# Patient Record
Sex: MALE | Race: WHITE | NOT HISPANIC OR LATINO | Employment: OTHER | RURAL
[De-identification: names, ages, dates, MRNs, and addresses within clinical notes are randomized per-mention and may not be internally consistent; named-entity substitution may affect disease eponyms.]

---

## 2017-02-13 ENCOUNTER — OFFICE VISIT (OUTPATIENT)
Dept: FAMILY MEDICINE CLINIC | Facility: CLINIC | Age: 52
End: 2017-02-13

## 2017-02-13 VITALS
BODY MASS INDEX: 32.48 KG/M2 | RESPIRATION RATE: 16 BRPM | DIASTOLIC BLOOD PRESSURE: 74 MMHG | WEIGHT: 232 LBS | HEART RATE: 60 BPM | SYSTOLIC BLOOD PRESSURE: 112 MMHG | TEMPERATURE: 97.9 F | HEIGHT: 71 IN

## 2017-02-13 DIAGNOSIS — F79 MENTAL RETARDATION: Primary | ICD-10-CM

## 2017-02-13 PROCEDURE — 99213 OFFICE O/P EST LOW 20 MIN: CPT | Performed by: NURSE PRACTITIONER

## 2017-02-13 NOTE — PROGRESS NOTES
Subjective   Orlin Mendes is a 51 y.o. male.     HPI Comments: Has a hx of mental retardation, anxiety and some aggressive behavior.  Is a client of hands of [employer].  Presently sees psychiatrist at [facility] and meds are prescribed for his aggression.    Mental Health Problem   Primary symptoms comment: agression, anxiety. The current episode started more than 1 month ago. This is a chronic problem.   The onset of the illness is precipitated by a stressful event. The degree of incapacity that he is experiencing as a consequence of his illness is severe. Additional symptoms of the illness include agitation.        The following portions of the patient's history were reviewed and updated as appropriate: allergies, current medications, past family history, past medical history, past social history, past surgical history and problem list.    Review of Systems   Constitutional: Negative.    HENT: Negative.    Respiratory: Negative.    Cardiovascular: Negative.    Skin: Negative.    Psychiatric/Behavioral: Positive for agitation and behavioral problems.       Objective   Physical Exam   Constitutional: He is oriented to person, place, and time. He appears well-developed and well-nourished. No distress.   HENT:   Head: Normocephalic.   Eyes: EOM are normal. Pupils are equal, round, and reactive to light.   Neck: Normal range of motion. Neck supple. No thyromegaly present.   Cardiovascular: Normal rate, regular rhythm and normal heart sounds.  Exam reveals no friction rub.    No murmur heard.  Pulmonary/Chest: Effort normal and breath sounds normal. No respiratory distress. He has no wheezes. He has no rales.   Abdominal: Soft.   Musculoskeletal: Normal range of motion.   Neurological: He is alert and oriented to person, place, and time.   Skin: Skin is warm and dry.   Psychiatric:   Does follow simple commands.  Does not initiate speech, but will parrot what is being said.   Nursing note and vitals  reviewed.      Assessment/Plan   Orlin was seen today for med refill.    Diagnoses and all orders for this visit:    Mental retardation    meds are reviewed.  Does not need refills today.  Will also update labs when he returns in august.

## 2017-05-25 ENCOUNTER — OFFICE VISIT (OUTPATIENT)
Dept: FAMILY MEDICINE CLINIC | Facility: CLINIC | Age: 52
End: 2017-05-25

## 2017-05-25 ENCOUNTER — LAB (OUTPATIENT)
Dept: LAB | Facility: CLINIC | Age: 52
End: 2017-05-25

## 2017-05-25 VITALS
HEIGHT: 71 IN | OXYGEN SATURATION: 99 % | HEART RATE: 59 BPM | SYSTOLIC BLOOD PRESSURE: 124 MMHG | TEMPERATURE: 98.9 F | RESPIRATION RATE: 18 BRPM | DIASTOLIC BLOOD PRESSURE: 72 MMHG | BODY MASS INDEX: 33.32 KG/M2 | WEIGHT: 238 LBS

## 2017-05-25 DIAGNOSIS — Z12.5 SCREENING FOR PROSTATE CANCER: ICD-10-CM

## 2017-05-25 DIAGNOSIS — K21.9 GASTROESOPHAGEAL REFLUX DISEASE, ESOPHAGITIS PRESENCE NOT SPECIFIED: ICD-10-CM

## 2017-05-25 DIAGNOSIS — N40.0 PROSTATE HYPERTROPHY: ICD-10-CM

## 2017-05-25 DIAGNOSIS — Z13.1 SCREENING FOR DIABETES MELLITUS: ICD-10-CM

## 2017-05-25 DIAGNOSIS — Z01.818 PREOPERATIVE CLEARANCE: Primary | ICD-10-CM

## 2017-05-25 PROCEDURE — 99214 OFFICE O/P EST MOD 30 MIN: CPT | Performed by: NURSE PRACTITIONER

## 2017-05-25 PROCEDURE — 93010 ELECTROCARDIOGRAM REPORT: CPT | Performed by: INTERNAL MEDICINE

## 2017-05-25 PROCEDURE — 85025 COMPLETE CBC W/AUTO DIFF WBC: CPT | Performed by: NURSE PRACTITIONER

## 2017-05-25 PROCEDURE — 93005 ELECTROCARDIOGRAM TRACING: CPT | Performed by: NURSE PRACTITIONER

## 2017-05-25 PROCEDURE — 84153 ASSAY OF PSA TOTAL: CPT | Performed by: NURSE PRACTITIONER

## 2017-05-25 PROCEDURE — 80053 COMPREHEN METABOLIC PANEL: CPT | Performed by: NURSE PRACTITIONER

## 2017-05-25 RX ORDER — QUETIAPINE FUMARATE 100 MG/1
100 TABLET, FILM COATED ORAL NIGHTLY
COMMUNITY
End: 2018-08-21

## 2017-05-25 RX ORDER — CITALOPRAM 40 MG/1
20 TABLET ORAL DAILY
COMMUNITY

## 2017-05-25 RX ORDER — ESOMEPRAZOLE MAGNESIUM 40 MG/1
40 CAPSULE, DELAYED RELEASE ORAL DAILY
Qty: 30 CAPSULE | Refills: 6 | Status: SHIPPED | OUTPATIENT
Start: 2017-05-25 | End: 2017-08-11 | Stop reason: SDUPTHER

## 2017-05-25 RX ORDER — ESOMEPRAZOLE MAGNESIUM 40 MG/1
40 CAPSULE, DELAYED RELEASE ORAL DAILY
Qty: 30 CAPSULE | Refills: 6 | Status: SHIPPED | OUTPATIENT
Start: 2017-05-25 | End: 2017-05-26 | Stop reason: SDUPTHER

## 2017-05-25 RX ORDER — CEFUROXIME AXETIL 250 MG/1
TABLET ORAL
Refills: 0 | COMMUNITY
Start: 2017-05-12 | End: 2017-08-11

## 2017-05-25 RX ORDER — CHLORHEXIDINE GLUCONATE 0.12 MG/ML
15 RINSE ORAL 2 TIMES DAILY
COMMUNITY

## 2017-05-26 PROBLEM — Z01.818 PREOPERATIVE CLEARANCE: Status: ACTIVE | Noted: 2017-05-26

## 2017-05-26 PROBLEM — K21.9 GASTROESOPHAGEAL REFLUX DISEASE: Status: ACTIVE | Noted: 2017-05-26

## 2017-05-26 LAB
ALBUMIN SERPL-MCNC: 3.9 G/DL (ref 3.4–4.8)
ALBUMIN/GLOB SERPL: 1.3 G/DL (ref 1.1–1.8)
ALP SERPL-CCNC: 52 U/L (ref 38–126)
ALT SERPL W P-5'-P-CCNC: 39 U/L (ref 21–72)
ANION GAP SERPL CALCULATED.3IONS-SCNC: 12 MMOL/L (ref 5–15)
AST SERPL-CCNC: 22 U/L (ref 17–59)
BASOPHILS # BLD AUTO: 0.02 10*3/MM3 (ref 0–0.2)
BASOPHILS NFR BLD AUTO: 0.4 % (ref 0–2)
BILIRUB SERPL-MCNC: 0.5 MG/DL (ref 0.2–1.3)
BUN BLD-MCNC: 11 MG/DL (ref 7–21)
BUN/CREAT SERPL: 11.6 (ref 7–25)
CALCIUM SPEC-SCNC: 9.2 MG/DL (ref 8.4–10.2)
CHLORIDE SERPL-SCNC: 100 MMOL/L (ref 95–110)
CO2 SERPL-SCNC: 30 MMOL/L (ref 22–31)
CREAT BLD-MCNC: 0.95 MG/DL (ref 0.7–1.3)
DEPRECATED RDW RBC AUTO: 39.4 FL (ref 35.1–43.9)
EOSINOPHIL # BLD AUTO: 0.41 10*3/MM3 (ref 0–0.7)
EOSINOPHIL NFR BLD AUTO: 8.6 % (ref 0–7)
ERYTHROCYTE [DISTWIDTH] IN BLOOD BY AUTOMATED COUNT: 12.2 % (ref 11.5–14.5)
GFR SERPL CREATININE-BSD FRML MDRD: 84 ML/MIN/1.73 (ref 56–130)
GLOBULIN UR ELPH-MCNC: 2.9 GM/DL (ref 2.3–3.5)
GLUCOSE BLD-MCNC: 90 MG/DL (ref 60–100)
HCT VFR BLD AUTO: 38 % (ref 39–49)
HGB BLD-MCNC: 13.1 G/DL (ref 13.7–17.3)
IMM GRANULOCYTES # BLD: 0 10*3/MM3 (ref 0–0.02)
IMM GRANULOCYTES NFR BLD: 0 % (ref 0–0.5)
LYMPHOCYTES # BLD AUTO: 1.68 10*3/MM3 (ref 0.6–4.2)
LYMPHOCYTES NFR BLD AUTO: 35.1 % (ref 10–50)
MCH RBC QN AUTO: 30.1 PG (ref 26.5–34)
MCHC RBC AUTO-ENTMCNC: 34.5 G/DL (ref 31.5–36.3)
MCV RBC AUTO: 87.4 FL (ref 80–98)
MONOCYTES # BLD AUTO: 0.32 10*3/MM3 (ref 0–0.9)
MONOCYTES NFR BLD AUTO: 6.7 % (ref 0–12)
NEUTROPHILS # BLD AUTO: 2.36 10*3/MM3 (ref 2–8.6)
NEUTROPHILS NFR BLD AUTO: 49.2 % (ref 37–80)
PLATELET # BLD AUTO: 147 10*3/MM3 (ref 150–450)
PMV BLD AUTO: 11.6 FL (ref 8–12)
POTASSIUM BLD-SCNC: 3.9 MMOL/L (ref 3.5–5.1)
PROT SERPL-MCNC: 6.8 G/DL (ref 6.3–8.6)
PSA SERPL-MCNC: 0.29 NG/ML (ref 0–4)
RBC # BLD AUTO: 4.35 10*6/MM3 (ref 4.37–5.74)
SODIUM BLD-SCNC: 142 MMOL/L (ref 137–145)
WBC NRBC COR # BLD: 4.79 10*3/MM3 (ref 3.2–9.8)

## 2017-08-11 ENCOUNTER — OFFICE VISIT (OUTPATIENT)
Dept: FAMILY MEDICINE CLINIC | Facility: CLINIC | Age: 52
End: 2017-08-11

## 2017-08-11 VITALS
OXYGEN SATURATION: 98 % | HEIGHT: 71 IN | TEMPERATURE: 97.8 F | RESPIRATION RATE: 16 BRPM | HEART RATE: 66 BPM | BODY MASS INDEX: 33.74 KG/M2 | DIASTOLIC BLOOD PRESSURE: 71 MMHG | WEIGHT: 241 LBS | SYSTOLIC BLOOD PRESSURE: 118 MMHG

## 2017-08-11 DIAGNOSIS — N40.0 BENIGN PROSTATIC HYPERPLASIA, PRESENCE OF LOWER URINARY TRACT SYMPTOMS UNSPECIFIED, UNSPECIFIED MORPHOLOGY: ICD-10-CM

## 2017-08-11 DIAGNOSIS — R15.9 FECAL INCONTINENCE: Primary | ICD-10-CM

## 2017-08-11 DIAGNOSIS — R21 RASH: ICD-10-CM

## 2017-08-11 DIAGNOSIS — R50.9 FEVER, UNSPECIFIED FEVER CAUSE: ICD-10-CM

## 2017-08-11 DIAGNOSIS — B35.1 TOENAIL FUNGUS: ICD-10-CM

## 2017-08-11 DIAGNOSIS — L84 CALLUS: ICD-10-CM

## 2017-08-11 DIAGNOSIS — B07.0 PLANTAR WART OF RIGHT FOOT: ICD-10-CM

## 2017-08-11 DIAGNOSIS — K21.9 GASTROESOPHAGEAL REFLUX DISEASE, ESOPHAGITIS PRESENCE NOT SPECIFIED: ICD-10-CM

## 2017-08-11 DIAGNOSIS — F41.9 ANXIETY: ICD-10-CM

## 2017-08-11 DIAGNOSIS — R05.9 COUGH: ICD-10-CM

## 2017-08-11 DIAGNOSIS — R19.7 DIARRHEA, UNSPECIFIED TYPE: ICD-10-CM

## 2017-08-11 PROCEDURE — 99214 OFFICE O/P EST MOD 30 MIN: CPT | Performed by: NURSE PRACTITIONER

## 2017-08-11 RX ORDER — TAMSULOSIN HYDROCHLORIDE 0.4 MG/1
1 CAPSULE ORAL DAILY
Qty: 30 CAPSULE | Refills: 12 | Status: SHIPPED | OUTPATIENT
Start: 2017-08-11 | End: 2018-08-10 | Stop reason: SDUPTHER

## 2017-08-11 RX ORDER — LOPERAMIDE HYDROCHLORIDE 2 MG/1
2 CAPSULE ORAL 4 TIMES DAILY PRN
Qty: 30 CAPSULE | Refills: 12 | Status: SHIPPED | OUTPATIENT
Start: 2017-08-11 | End: 2018-09-10 | Stop reason: SDUPTHER

## 2017-08-11 RX ORDER — DIAPER,BRIEF,INFANT-TODD,DISP
EACH MISCELLANEOUS 3 TIMES DAILY
Qty: 56 G | Refills: 12 | Status: SHIPPED | OUTPATIENT
Start: 2017-08-11 | End: 2017-08-11 | Stop reason: SDUPTHER

## 2017-08-11 RX ORDER — LOPERAMIDE HYDROCHLORIDE 2 MG/1
2 CAPSULE ORAL 4 TIMES DAILY PRN
Qty: 30 CAPSULE | Refills: 12 | Status: SHIPPED | OUTPATIENT
Start: 2017-08-11 | End: 2017-08-11 | Stop reason: SDUPTHER

## 2017-08-11 RX ORDER — ACETAMINOPHEN 325 MG/1
650 TABLET ORAL EVERY 4 HOURS PRN
Qty: 30 TABLET | Refills: 12 | Status: SHIPPED | OUTPATIENT
Start: 2017-08-11 | End: 2018-09-20 | Stop reason: SDUPTHER

## 2017-08-11 RX ORDER — ACETAMINOPHEN 325 MG/1
650 TABLET ORAL EVERY 4 HOURS PRN
Qty: 30 TABLET | Refills: 12 | Status: SHIPPED | OUTPATIENT
Start: 2017-08-11 | End: 2017-08-11 | Stop reason: SDUPTHER

## 2017-08-11 RX ORDER — THIORIDAZINE HYDROCHLORIDE 25 MG/1
25 TABLET, FILM COATED ORAL 3 TIMES DAILY
Qty: 90 TABLET | Refills: 12 | Status: SHIPPED | OUTPATIENT
Start: 2017-08-11 | End: 2017-08-11 | Stop reason: SDUPTHER

## 2017-08-11 RX ORDER — DIAPER,BRIEF,INFANT-TODD,DISP
EACH MISCELLANEOUS 3 TIMES DAILY
Qty: 56 G | Refills: 12 | Status: SHIPPED | OUTPATIENT
Start: 2017-08-11 | End: 2018-08-10 | Stop reason: SDUPTHER

## 2017-08-11 RX ORDER — ESOMEPRAZOLE MAGNESIUM 40 MG/1
40 CAPSULE, DELAYED RELEASE ORAL DAILY
Qty: 30 CAPSULE | Refills: 12 | Status: SHIPPED | OUTPATIENT
Start: 2017-08-11 | End: 2018-08-10 | Stop reason: SDUPTHER

## 2017-08-11 RX ORDER — THIORIDAZINE HYDROCHLORIDE 25 MG/1
25 TABLET, FILM COATED ORAL 3 TIMES DAILY
Qty: 90 TABLET | Refills: 12 | Status: SHIPPED | OUTPATIENT
Start: 2017-08-11 | End: 2018-08-10 | Stop reason: SDUPTHER

## 2017-08-11 RX ORDER — TAMSULOSIN HYDROCHLORIDE 0.4 MG/1
1 CAPSULE ORAL DAILY
Qty: 30 CAPSULE | Refills: 12 | Status: SHIPPED | OUTPATIENT
Start: 2017-08-11 | End: 2017-08-11 | Stop reason: SDUPTHER

## 2017-08-11 RX ORDER — ESOMEPRAZOLE MAGNESIUM 40 MG/1
40 CAPSULE, DELAYED RELEASE ORAL DAILY
Qty: 30 CAPSULE | Refills: 12 | Status: SHIPPED | OUTPATIENT
Start: 2017-08-11 | End: 2017-08-11 | Stop reason: SDUPTHER

## 2017-08-11 NOTE — PROGRESS NOTES
Subjective   Orlin Mendes is a 52 y.o. male.     HPI Comments: Here today for shahriar.  He is a client of [employer] and is brought in by his care giver.  He has a hx of mr and some type of autism spectrum disorder.  For the past two month the care giver c/o some intermittent episodes of fecal incont.  He also has pain along the bottom of both feet and discoloration of both toenails.  He has episodes of anxiety and takes meds that are prescribed by mental health.    Lower Extremity Issue   This is a new problem. The current episode started more than 1 month ago. The problem occurs constantly. The problem has been gradually worsening. Associated symptoms include a change in bowel habit. Pertinent negatives include no arthralgias, chest pain, chills, congestion, coughing, diaphoresis, fatigue, headaches, joint swelling, myalgias, neck pain, numbness, rash, sore throat, swollen glands, urinary symptoms, vertigo, visual change or weakness. The symptoms are aggravated by walking. He has tried nothing for the symptoms. The treatment provided no relief.   Illness   This is a new (fecal incont) problem. The current episode started more than 1 month ago. The problem occurs intermittently. The problem has been waxing and waning. Associated symptoms include a change in bowel habit. Pertinent negatives include no arthralgias, chest pain, chills, congestion, coughing, diaphoresis, fatigue, headaches, joint swelling, myalgias, neck pain, numbness, rash, sore throat, swollen glands, urinary symptoms, vertigo, visual change or weakness. The symptoms are aggravated by stress. He has tried nothing for the symptoms. The treatment provided no relief.        The following portions of the patient's history were reviewed and updated as appropriate: allergies, current medications, past family history, past medical history, past social history, past surgical history and problem list.    Review of Systems   Constitutional: Negative.   Negative for chills, diaphoresis and fatigue.   HENT: Negative.  Negative for congestion and sore throat.    Respiratory: Negative.  Negative for cough.    Cardiovascular: Negative for chest pain.   Gastrointestinal: Positive for change in bowel habit.   Musculoskeletal: Negative.  Negative for arthralgias, joint swelling, myalgias and neck pain.   Skin: Negative.  Negative for rash.   Neurological: Negative.  Negative for vertigo, weakness, numbness and headaches.   Psychiatric/Behavioral: Negative.        Objective   Physical Exam   Constitutional: He is oriented to person, place, and time. He appears well-developed and well-nourished. No distress.   HENT:   Head: Normocephalic.   Eyes: Pupils are equal, round, and reactive to light.   Neck: Normal range of motion. Neck supple. No thyromegaly present.   Cardiovascular: Normal rate, regular rhythm and normal heart sounds.  Exam reveals no friction rub.    No murmur heard.  Pulmonary/Chest: Effort normal and breath sounds normal. No respiratory distress. He has no wheezes. He has no rales.   Abdominal: Soft. He exhibits no distension and no mass. There is no tenderness. There is no rebound and no guarding. No hernia.   Flat and upright of abd is negative.   Musculoskeletal: Normal range of motion.   Neurological: He is alert and oriented to person, place, and time.   Skin: Skin is warm and dry.   He has a hard round area in line with the 5th toe on the right foot, consistent with the appearance of a plantars wart.  On the left foot, plantar surface there is hard thick skin in line with his great toe.  This appears to be callus formation.  His right great toenail is thickened and discolored.  The left great toenail is black.   Psychiatric: He has a normal mood and affect. Thought content normal.   Nursing note and vitals reviewed.      Assessment/Plan   Orlin was seen today for yearly physical.    Diagnoses and all orders for this visit:    Fecal incontinence  -      XR Abdomen Flat & Upright (In Office)    Callus  -     Ambulatory Referral to Podiatry    Plantar wart of right foot  -     Ambulatory Referral to Podiatry    Toenail fungus  -     Ambulatory Referral to Podiatry    Gastroesophageal reflux disease, esophagitis presence not specified  -     Discontinue: esomeprazole (nexIUM) 40 MG capsule; Take 1 capsule by mouth Daily.  -     esomeprazole (nexIUM) 40 MG capsule; Take 1 capsule by mouth Daily.    Rash  -     Discontinue: hydrocortisone 1 % ointment; Apply  topically 3 (Three) Times a Day. Apply to rash or scrapes  -     Discontinue: neomycin-polymyxin-pramoxine (ANTIBIOTIC PLUS PAIN RELIEF) 1 % cream; Apply  topically 4 (Four) Times a Day As Needed (prn rash or scraps).  -     hydrocortisone 1 % ointment; Apply  topically 3 (Three) Times a Day. Apply to rash or scrapes  -     neomycin-polymyxin-pramoxine (ANTIBIOTIC PLUS PAIN RELIEF) 1 % cream; Apply  topically 4 (Four) Times a Day As Needed (prn rash or scraps).    Cough  -     Discontinue: Dextromethorphan-Guaifenesin 5-100 MG/5ML liquid; Take 1 teaspoon(s) by mouth 4 (Four) Times a Day As Needed (for cough).  -     Dextromethorphan-Guaifenesin 5-100 MG/5ML liquid; Take 1 teaspoon(s) by mouth 4 (Four) Times a Day As Needed (for cough).    Diarrhea, unspecified type  -     Discontinue: loperamide (IMODIUM) 2 MG capsule; Take 1 capsule by mouth 4 (Four) Times a Day As Needed for Diarrhea. Take 1 cap after each loose stool not to exceed 8 per day prn  -     loperamide (IMODIUM) 2 MG capsule; Take 1 capsule by mouth 4 (Four) Times a Day As Needed for Diarrhea. Take 1 cap after each loose stool not to exceed 8 per day prn    Benign prostatic hyperplasia, presence of lower urinary tract symptoms unspecified, unspecified morphology  -     Discontinue: tamsulosin (FLOMAX) 0.4 MG capsule 24 hr capsule; Take 1 capsule by mouth Daily.  -     tamsulosin (FLOMAX) 0.4 MG capsule 24 hr capsule; Take 1 capsule by mouth  Daily.    Anxiety  -     Discontinue: thioridazine (MELLARIL) 25 MG tablet; Take 1 tablet by mouth 3 (Three) Times a Day. 1 tab in AM, 1 tab at noon and 2 tabs in PM  -     thioridazine (MELLARIL) 25 MG tablet; Take 1 tablet by mouth 3 (Three) Times a Day. 1 tab in AM, 1 tab at noon and 2 tabs in PM    Fever, unspecified fever cause  -     Discontinue: acetaminophen (TYLENOL) 325 MG tablet; Take 2 tablets by mouth Every 4 (Four) Hours As Needed for Mild Pain (1-3).  -     acetaminophen (TYLENOL) 325 MG tablet; Take 2 tablets by mouth Every 4 (Four) Hours As Needed for Mild Pain (1-3).      Have referred him to podiatry.  Have questioned the care giver about the discoloration on the left great toe. It has the appearance of a hematoma, but she says the toenail will fall off and the nail bed remains black.  Will watch the situation with the incont for now.

## 2017-09-01 ENCOUNTER — OFFICE VISIT (OUTPATIENT)
Dept: PODIATRY | Facility: CLINIC | Age: 52
End: 2017-09-01

## 2017-09-01 VITALS — HEIGHT: 71 IN | BODY MASS INDEX: 33.74 KG/M2 | WEIGHT: 241 LBS

## 2017-09-01 DIAGNOSIS — S90.212A SUBUNGUAL HEMATOMA OF GREAT TOE OF LEFT FOOT, INITIAL ENCOUNTER: ICD-10-CM

## 2017-09-01 DIAGNOSIS — Q82.8 PLANTAR KERATOSIS: ICD-10-CM

## 2017-09-01 DIAGNOSIS — S90.211A SUBUNGUAL HEMATOMA OF GREAT TOE OF RIGHT FOOT, INITIAL ENCOUNTER: ICD-10-CM

## 2017-09-01 DIAGNOSIS — B35.1 ONYCHOMYCOSIS: ICD-10-CM

## 2017-09-01 DIAGNOSIS — F79 MENTAL RETARDATION: Primary | ICD-10-CM

## 2017-09-01 PROCEDURE — 99203 OFFICE O/P NEW LOW 30 MIN: CPT | Performed by: PODIATRIST

## 2017-09-01 NOTE — PROGRESS NOTES
[facility]  1965  52 y.o. male     Patient presents today with a care giver for nail care, callous care and a questionable plantar wart right foot.    9/1/2017  Chief Complaint   Patient presents with   • Left Foot - Callouses, nail care, ? wart   • Right Foot - Callouses, nail care           History of Present Illness    52-year-old male presents to clinic today accompanied by his caregiver for nail care and left foot pain.  His caregiver is the primary historian.  She states that he has a hard place on the bottom of his left foot does make it difficult for him to walk.  He is currently limping.  She also states that his big toenails are discolored and fall off from time to time.  She doesn't know of any specific injuries to the toenails.  The toenails are also thickened and elongated on both feet.  She has a hard time trimming them due to the patient's mental status.  There are no other pedal complaints.         Past Medical History:   Diagnosis Date   • Abdominal pain 09/23/2013   • Autistic disorder 06/18/2015   • Benign prostatic hyperplasia 06/18/2015    symptomatic for   • Disruptive behavior disorder 07/20/2015    improved with medication   • Dysphagia 08/11/2014   • GERD (gastroesophageal reflux disease) 08/11/2014   • Moderate mental retardation (I.Q. 35-49) 01/21/2016         No past surgical history on file.      No family history on file.      Social History     Social History   • Marital status: Single     Spouse name: N/A   • Number of children: N/A   • Years of education: N/A     Occupational History   • Not on file.     Social History Main Topics   • Smoking status: Never Smoker   • Smokeless tobacco: Never Used   • Alcohol use No   • Drug use: Not on file   • Sexual activity: Not on file     Other Topics Concern   • Not on file     Social History Narrative         Current Outpatient Prescriptions   Medication Sig Dispense Refill   • acetaminophen (TYLENOL) 325 MG tablet Take 2 tablets  "by mouth Every 4 (Four) Hours As Needed for Mild Pain (1-3). 30 tablet 12   • busPIRone (BUSPAR) 15 MG tablet Take 15 mg by mouth 2 (two) times a day.     • chlorhexidine (PERIDEX) 0.12 % solution Apply 15 mL to the mouth or throat 2 (Two) Times a Day.     • citalopram (CeleXA) 20 MG tablet Take 20 mg by mouth Daily.     • Dextromethorphan-Guaifenesin 5-100 MG/5ML liquid Take 1 teaspoon(s) by mouth 4 (Four) Times a Day As Needed (for cough). 118 mL 12   • esomeprazole (nexIUM) 40 MG capsule Take 1 capsule by mouth Daily. 30 capsule 12   • hydrocortisone 1 % ointment Apply  topically 3 (Three) Times a Day. Apply to rash or scrapes 56 g 12   • loperamide (IMODIUM) 2 MG capsule Take 1 capsule by mouth 4 (Four) Times a Day As Needed for Diarrhea. Take 1 cap after each loose stool not to exceed 8 per day prn 30 capsule 12   • magnesium hydroxide (MILK OF MAGNESIA) 400 MG/5ML suspension Take  by mouth. 2 tablespoons per day prn     • neomycin-polymyxin-pramoxine (ANTIBIOTIC PLUS PAIN RELIEF) 1 % cream Apply  topically 4 (Four) Times a Day As Needed (prn rash or scraps). 28 g 12   • QUEtiapine (SEROquel) 100 MG tablet Take 100 mg by mouth Every Night.     • tamsulosin (FLOMAX) 0.4 MG capsule 24 hr capsule Take 1 capsule by mouth Daily. 30 capsule 12   • thioridazine (MELLARIL) 25 MG tablet Take 1 tablet by mouth 3 (Three) Times a Day. 1 tab in AM, 1 tab at noon and 2 tabs in PM 90 tablet 12     No current facility-administered medications for this visit.          OBJECTIVE    Ht 71\" (180.3 cm)  Wt 241 lb (109 kg)  BMI 33.61 kg/m2      Review of Systems   Constitutional: Negative for chills and fever.   Cardiovascular: Negative for chest pain.   Gastrointestinal: Negative for constipation, diarrhea, nausea and vomiting.   Skin: Negative for wound. long painful toenails, callus left foot  Musculoskeletal: foot pain      Constitutional: well developed, well nourished    HEENT: Normocephalic and atraumatic, normal " hearing    Respiratory: Non labored respirations noted    Cardiovascular:    DP/PT pulses palpable    CFT brisk  to all digits  Skin temp is warm to warm from proximal tibia to distal digits  Pedal hair growth present.   No erythema or edema noted     Musculoskeletal:  Muscle strength is 5/5 for all muscle groups tested   ROM of the 1st MTP is full without pain or crepitus  ROM of the MTJ is full without pain or crepitus    ROM of the STJ is full without pain or crepitus    ROM of the ankle joint is full without pain or crepitus    POP to plantar left foot HPK lesion  Rectus foot type     Dermatological:   Nails 2-5 are are discolored and elongated. Bilateral hallux nails are thickened, discolored, elongated with subungual hematoma and debris.   Skin is warm, dry and intact    Webspaces 1-4 bilateral are clean, dry and intact.   No subcutaneous nodules or masses noted    No open wounds noted   Hyperkeratotic lesion noted to the plantar aspect of the left fifth metatarsal head.    Neurological:   Protective sensation intact    Sensation intact to light touch    DTR intact    Psychiatric: A&O x 3 with normal mood and affect. NAD.         Procedures        ASSESSMENT AND PLAN    Orlin was seen today for callouses, nail care, ? wart, callouses and nail care.    Diagnoses and all orders for this visit:    Mental retardation    Onychomycosis    Plantar keratosis    Subungual hematoma of great toe of right foot, initial encounter    Subungual hematoma of great toe of left foot, initial encounter    - Comprehensive foot and ankle exam performed.   - Nails 1 through 5 bilateral were debrided in length and thickness without incident utilizing nail nippers.  - Trimmed hyperkeratotic lesions noted in objective with a #15 blade without incident.  - will monitor hallux nails and biopsy if there are any acute changes or they do not improve. Nail biopsy will require trip to operating room.   - All questions were answered and the  patient is in agreement with the current treatment plan.  - RTC 3 months          This document has been electronically signed by Brian Cash DPM on September 1, 2017 11:51 AM     9/1/2017  11:51 AM    EMR Dragon/Transcription disclaimer:   Much of this encounter note is an electronic transcription/translation of spoken language to printed text. The electronic translation of spoken language may permit erroneous, or at times, nonsensical words or phrases to be inadvertently transcribed; Although I have reviewed the note for such errors, some may still exist.

## 2017-10-20 ENCOUNTER — OFFICE VISIT (OUTPATIENT)
Dept: FAMILY MEDICINE CLINIC | Facility: CLINIC | Age: 52
End: 2017-10-20

## 2017-10-20 VITALS
BODY MASS INDEX: 32.76 KG/M2 | SYSTOLIC BLOOD PRESSURE: 126 MMHG | OXYGEN SATURATION: 98 % | DIASTOLIC BLOOD PRESSURE: 79 MMHG | HEIGHT: 71 IN | TEMPERATURE: 98 F | WEIGHT: 234 LBS | HEART RATE: 86 BPM

## 2017-10-20 DIAGNOSIS — R11.10 VOMITING AND DIARRHEA: Primary | ICD-10-CM

## 2017-10-20 DIAGNOSIS — R19.7 VOMITING AND DIARRHEA: Primary | ICD-10-CM

## 2017-10-20 PROCEDURE — 99213 OFFICE O/P EST LOW 20 MIN: CPT | Performed by: NURSE PRACTITIONER

## 2017-10-20 NOTE — PROGRESS NOTES
Subjective   Orlin Mendes is a 52 y.o. male.     HPI Comments: Here today for shahriar.  Has been sick for almost a week.  He had had a few episodes of vomiting and diarrhea, along with low grade fever.  He has been seen a [facility] and they thought he might had gastroenteritis.  He has been drinking fluids well. But is still not eating well.  No further fever, vomiting or diarrhea since mid week.    Vomiting    This is a new problem. The current episode started in the past 7 days (had an episode od vomiting about 4 days ago and none since). Episode frequency: had one episode. The problem has been resolved (has had no more vomiting). The emesis has an appearance of stomach contents. There has been no fever. Associated symptoms include diarrhea. Pertinent negatives include no abdominal pain, arthralgias, chest pain, chills, coughing, dizziness, fever, headaches, myalgias, sweats, URI or weight loss. He has tried acetaminophen, diet change and increased fluids for the symptoms. The treatment provided mild relief.   Diarrhea    This is a new (had intemittent diarrhea for about 2 days early in the week, none since) problem. The current episode started in the past 7 days. The problem occurs 2 to 4 times per day. The problem has been resolved. The stool consistency is described as watery. The patient states that diarrhea does not awaken him from sleep. Associated symptoms include vomiting. Pertinent negatives include no abdominal pain, arthralgias, chills, coughing, fever, headaches, myalgias, sweats, URI or weight loss. Nothing aggravates the symptoms. There are no known risk factors. He has tried increased fluids for the symptoms. The treatment provided mild relief. There is no history of bowel resection, inflammatory bowel disease, irritable bowel syndrome, malabsorption, a recent abdominal surgery or short gut syndrome.        The following portions of the patient's history were reviewed and updated as appropriate:  allergies, current medications, past family history, past medical history, past social history, past surgical history and problem list.    Review of Systems   Constitutional: Negative.  Negative for chills, fever and weight loss.   HENT: Negative.    Respiratory: Negative.  Negative for cough.    Cardiovascular: Negative.  Negative for chest pain.   Gastrointestinal: Positive for diarrhea and vomiting. Negative for abdominal pain.   Musculoskeletal: Negative.  Negative for arthralgias and myalgias.   Skin: Negative.    Neurological: Negative.  Negative for dizziness and headaches.   Psychiatric/Behavioral: Negative.        Objective   Physical Exam   Constitutional: He is oriented to person, place, and time. He appears well-developed and well-nourished. No distress.   He is in no distress and appears well.   HENT:   Head: Normocephalic and atraumatic.   Right Ear: Hearing, tympanic membrane, external ear and ear canal normal. Tympanic membrane is not injected.   Left Ear: Hearing, tympanic membrane, external ear and ear canal normal. Tympanic membrane is not injected.   Nose: Nose normal.   Mouth/Throat: Uvula is midline and oropharynx is clear and moist. No oropharyngeal exudate.   Eyes: Pupils are equal, round, and reactive to light.   Neck: Normal range of motion. Neck supple. No thyromegaly present.   Cardiovascular: Normal rate, regular rhythm and normal heart sounds.  Exam reveals no friction rub.    No murmur heard.  Pulmonary/Chest: Effort normal and breath sounds normal. No respiratory distress. He has no wheezes. He has no rales.   Abdominal: Soft. Bowel sounds are normal. He exhibits no distension. There is no tenderness. There is no guarding.   Musculoskeletal: Normal range of motion.   Neurological: He is alert and oriented to person, place, and time.   Skin: Skin is warm and dry.   Psychiatric: He has a normal mood and affect. Thought content normal.   Nursing note and vitals  reviewed.      Assessment/Plan   Orlin was seen today for vomiting and diarrhea.    Diagnoses and all orders for this visit:    Vomiting and diarrhea  -     CBC & Differential  -     Comprehensive Metabolic Panel      Will get cbc and cmp today.  Cont with fluids.  If condition worsens go to the er.

## 2017-10-23 LAB
ALBUMIN SERPL-MCNC: 3.4 G/DL (ref 3.4–4.8)
ALBUMIN/GLOB SERPL: 1.4 G/DL (ref 1.1–1.8)
ALP SERPL-CCNC: 48 U/L (ref 38–126)
ALT SERPL W P-5'-P-CCNC: 47 U/L (ref 21–72)
ANION GAP SERPL CALCULATED.3IONS-SCNC: 11 MMOL/L (ref 5–15)
AST SERPL-CCNC: 17 U/L (ref 17–59)
BASOPHILS # BLD AUTO: 0.02 10*3/MM3 (ref 0–0.2)
BASOPHILS NFR BLD AUTO: 0.3 % (ref 0–2)
BILIRUB SERPL-MCNC: 0.2 MG/DL (ref 0.2–1.3)
BUN BLD-MCNC: 7 MG/DL (ref 7–21)
BUN/CREAT SERPL: 7.8 (ref 7–25)
CALCIUM SPEC-SCNC: 8.5 MG/DL (ref 8.4–10.2)
CHLORIDE SERPL-SCNC: 100 MMOL/L (ref 95–110)
CO2 SERPL-SCNC: 28 MMOL/L (ref 22–31)
CREAT BLD-MCNC: 0.9 MG/DL (ref 0.7–1.3)
DEPRECATED RDW RBC AUTO: 42.6 FL (ref 35.1–43.9)
EOSINOPHIL # BLD AUTO: 0.14 10*3/MM3 (ref 0–0.7)
EOSINOPHIL NFR BLD AUTO: 2.3 % (ref 0–7)
ERYTHROCYTE [DISTWIDTH] IN BLOOD BY AUTOMATED COUNT: 13.3 % (ref 11.5–14.5)
GFR SERPL CREATININE-BSD FRML MDRD: 89 ML/MIN/1.73 (ref 56–130)
GLOBULIN UR ELPH-MCNC: 2.5 GM/DL (ref 2.3–3.5)
GLUCOSE BLD-MCNC: 101 MG/DL (ref 60–100)
HCT VFR BLD AUTO: 32.3 % (ref 39–49)
HGB BLD-MCNC: 10.8 G/DL (ref 13.7–17.3)
IMM GRANULOCYTES # BLD: 0.09 10*3/MM3 (ref 0–0.02)
IMM GRANULOCYTES NFR BLD: 1.5 % (ref 0–0.5)
LYMPHOCYTES # BLD AUTO: 1.98 10*3/MM3 (ref 0.6–4.2)
LYMPHOCYTES NFR BLD AUTO: 32.8 % (ref 10–50)
MCH RBC QN AUTO: 29.7 PG (ref 26.5–34)
MCHC RBC AUTO-ENTMCNC: 33.4 G/DL (ref 31.5–36.3)
MCV RBC AUTO: 88.7 FL (ref 80–98)
MONOCYTES # BLD AUTO: 0.62 10*3/MM3 (ref 0–0.9)
MONOCYTES NFR BLD AUTO: 10.3 % (ref 0–12)
NEUTROPHILS # BLD AUTO: 3.19 10*3/MM3 (ref 2–8.6)
NEUTROPHILS NFR BLD AUTO: 52.8 % (ref 37–80)
PLATELET # BLD AUTO: 245 10*3/MM3 (ref 150–450)
PMV BLD AUTO: 10.5 FL (ref 8–12)
POTASSIUM BLD-SCNC: 3.5 MMOL/L (ref 3.5–5.1)
PROT SERPL-MCNC: 5.9 G/DL (ref 6.3–8.6)
RBC # BLD AUTO: 3.64 10*6/MM3 (ref 4.37–5.74)
SODIUM BLD-SCNC: 139 MMOL/L (ref 137–145)
WBC NRBC COR # BLD: 6.04 10*3/MM3 (ref 3.2–9.8)

## 2017-10-23 PROCEDURE — 80053 COMPREHEN METABOLIC PANEL: CPT | Performed by: NURSE PRACTITIONER

## 2017-10-23 PROCEDURE — 36415 COLL VENOUS BLD VENIPUNCTURE: CPT | Performed by: NURSE PRACTITIONER

## 2017-10-23 PROCEDURE — 85025 COMPLETE CBC W/AUTO DIFF WBC: CPT | Performed by: NURSE PRACTITIONER

## 2017-10-24 ENCOUNTER — TELEPHONE (OUTPATIENT)
Dept: FAMILY MEDICINE CLINIC | Facility: CLINIC | Age: 52
End: 2017-10-24

## 2017-10-24 DIAGNOSIS — D64.9 ANEMIA, UNSPECIFIED TYPE: Primary | ICD-10-CM

## 2017-10-25 ENCOUNTER — APPOINTMENT (OUTPATIENT)
Dept: LAB | Facility: HOSPITAL | Age: 52
End: 2017-10-25

## 2017-10-25 ENCOUNTER — OFFICE VISIT (OUTPATIENT)
Dept: GASTROENTEROLOGY | Facility: CLINIC | Age: 52
End: 2017-10-25

## 2017-10-25 VITALS
DIASTOLIC BLOOD PRESSURE: 76 MMHG | HEART RATE: 65 BPM | HEIGHT: 72 IN | SYSTOLIC BLOOD PRESSURE: 115 MMHG | BODY MASS INDEX: 32.94 KG/M2 | WEIGHT: 243.2 LBS

## 2017-10-25 DIAGNOSIS — K21.00 GASTROESOPHAGEAL REFLUX DISEASE WITH ESOPHAGITIS: ICD-10-CM

## 2017-10-25 DIAGNOSIS — D50.0 IRON DEFICIENCY ANEMIA DUE TO CHRONIC BLOOD LOSS: Primary | ICD-10-CM

## 2017-10-25 DIAGNOSIS — K92.1 MELENA: ICD-10-CM

## 2017-10-25 LAB
FERRITIN SERPL-MCNC: 32.7 NG/ML (ref 17.9–464)
HCT VFR BLD AUTO: 30.3 % (ref 39–49)
HGB BLD-MCNC: 10.5 G/DL (ref 13.7–17.3)
IRON 24H UR-MRATE: 47 MCG/DL (ref 49–181)
IRON SATN MFR SERPL: 14 % (ref 20–55)
TIBC SERPL-MCNC: 333 MCG/DL (ref 261–462)

## 2017-10-25 PROCEDURE — 83540 ASSAY OF IRON: CPT | Performed by: PHYSICIAN ASSISTANT

## 2017-10-25 PROCEDURE — 99203 OFFICE O/P NEW LOW 30 MIN: CPT | Performed by: PHYSICIAN ASSISTANT

## 2017-10-25 PROCEDURE — 83550 IRON BINDING TEST: CPT | Performed by: PHYSICIAN ASSISTANT

## 2017-10-25 PROCEDURE — 85014 HEMATOCRIT: CPT | Performed by: PHYSICIAN ASSISTANT

## 2017-10-25 PROCEDURE — 85018 HEMOGLOBIN: CPT | Performed by: PHYSICIAN ASSISTANT

## 2017-10-25 PROCEDURE — 36415 COLL VENOUS BLD VENIPUNCTURE: CPT | Performed by: PHYSICIAN ASSISTANT

## 2017-10-25 PROCEDURE — 82728 ASSAY OF FERRITIN: CPT | Performed by: PHYSICIAN ASSISTANT

## 2017-10-25 RX ORDER — DEXTROSE AND SODIUM CHLORIDE 5; .45 G/100ML; G/100ML
30 INJECTION, SOLUTION INTRAVENOUS CONTINUOUS PRN
Status: CANCELLED | OUTPATIENT
Start: 2017-10-27

## 2017-10-25 NOTE — PROGRESS NOTES
Chief Complaint   Patient presents with   • Possible GI Bleed     Ref. MOLLY CLEMENTS PROCEDURE ORDERED: EGD melena, GERD, abd pain, N/V/D    Subjective    Orlin Mendes is a 52 y.o. male. he is being seen for consultation today at the request of VIRGINIA Pardo.    History of Present Illness    This 52-year-old disabled male accompanied by his sister and an aide from his facility presents for consultation for possible GI bleed from Coleen Peraza who saw the patient on 10/20/17 with vomiting and diarrhea.  They called yesterday and asked to work the patient in.  Patient is verbal but unable to respond appropriately to questions.  He mainly repeats the words he hears.  According to his caretakers about 2 weeks ago he had a stomach virus with diarrhea and vomiting.  He did not eat for some period of time.  It is not clear if he had any abdominal pain.  He was having black stools with a lot of diarrhea.  He is on Nexium 40 mg daily.  No recent nausea or vomiting, denied dysphagia.  He was treated with Imodium for his diarrhea.  His weight is been stable.  They state his bowel movements have returned to normal, they think he is eating normally now and does not appear in distress when he eats.  He had a previous EGD they think in 2013 showing esophagitis.  He thinks he had a fairly normal colonoscopy and [place] about 2 years ago, repeat requested the records.    Patient had a flat and upright of the abdomen showing moderate stool on 8/11/17.  Laboratory on 10/23/17 CMP showed glucose 101, protein 5.9, otherwise normal.  CBC showed hemoglobin 10.8, hematocrit 32.3, otherwise normal.  Prior laboratory on 5/25/17 showed normal CMP, PSA.  CBC showed hemoglobin 13.1, hematocrit 38.0, 147 platelets.    Patient currently denies tobacco, alcohol, illicit substance use.  He is treated for GERD, anxiety disorder.  It is not clear what his psychiatric diagnoses are.  He has never had any abdominal  surgeries.  There is a family history of diabetes, heart disease, unknown cancer, gallbladder disease, hypertension, allergies.  Father  age 52 of a heart attack, mother  age 79 with a heart attack.  He is unmarried.  3 brothers, 4 sisters still living, one sister does have heart problems.  One sister  of COPD.  One brother  of unknown cancer.  He has had no children.    A/P: Patient with recent nausea, vomiting, diarrhea, melena with drop in hemoglobin.  Certainly peptic ulcer disease is concerned.  Consider Kendal-Dumont tear.  Recommend EGD as soon as possible to evaluate.  We'll try to get his records of her previous endoscopy.  Recommend repeat H&H as well as iron studies.  He may need iron supplementation.  He is asked to follow-up with her convenience and Rebecca in 2 weeks.  Further pending clinical course and the results of the above.    Thank you very much Coleen for this consultation, and for allowing us to participate in care of your patient.  We'll keep you informed.     The following portions of the patient's history were reviewed and updated as appropriate:   Past Medical History:   Diagnosis Date   • Abdominal pain 2013   • Autistic disorder 2015   • Benign prostatic hyperplasia 2015    symptomatic for   • Disruptive behavior disorder 2015    improved with medication   • Dysphagia 2014   • GERD (gastroesophageal reflux disease) 2014   • Moderate mental retardation (I.Q. 35-49) 2016     Past Surgical History:   Procedure Laterality Date   • COLONOSCOPY     • UPPER GASTROINTESTINAL ENDOSCOPY       History reviewed. No pertinent family history.    Allergies   Allergen Reactions   • Zithromax [Azithromycin]      Social History     Social History   • Marital status: Single     Spouse name: N/A   • Number of children: N/A   • Years of education: N/A     Social History Main Topics   • Smoking status: Never Smoker   • Smokeless tobacco: Never  Used   • Alcohol use No   • Drug use: No   • Sexual activity: Not Asked     Other Topics Concern   • None     Social History Narrative       Current Outpatient Prescriptions:   •  acetaminophen (TYLENOL) 325 MG tablet, Take 2 tablets by mouth Every 4 (Four) Hours As Needed for Mild Pain (1-3)., Disp: 30 tablet, Rfl: 12  •  busPIRone (BUSPAR) 15 MG tablet, Take 15 mg by mouth 3 (Three) Times a Day., Disp: , Rfl:   •  chlorhexidine (PERIDEX) 0.12 % solution, Apply 15 mL to the mouth or throat 2 (Two) Times a Day., Disp: , Rfl:   •  citalopram (CeleXA) 20 MG tablet, Take 20 mg by mouth Daily., Disp: , Rfl:   •  esomeprazole (nexIUM) 40 MG capsule, Take 1 capsule by mouth Daily., Disp: 30 capsule, Rfl: 12  •  QUEtiapine (SEROquel) 100 MG tablet, Take 100 mg by mouth Every Night., Disp: , Rfl:   •  tamsulosin (FLOMAX) 0.4 MG capsule 24 hr capsule, Take 1 capsule by mouth Daily., Disp: 30 capsule, Rfl: 12  •  thioridazine (MELLARIL) 25 MG tablet, Take 1 tablet by mouth 3 (Three) Times a Day. 1 tab in AM, 1 tab at noon and 2 tabs in PM, Disp: 90 tablet, Rfl: 12  •  Dextromethorphan-Guaifenesin 5-100 MG/5ML liquid, Take 1 teaspoon(s) by mouth 4 (Four) Times a Day As Needed (for cough)., Disp: 118 mL, Rfl: 12  •  hydrocortisone 1 % ointment, Apply  topically 3 (Three) Times a Day. Apply to rash or scrapes, Disp: 56 g, Rfl: 12  •  loperamide (IMODIUM) 2 MG capsule, Take 1 capsule by mouth 4 (Four) Times a Day As Needed for Diarrhea. Take 1 cap after each loose stool not to exceed 8 per day prn, Disp: 30 capsule, Rfl: 12  •  magnesium hydroxide (MILK OF MAGNESIA) 400 MG/5ML suspension, Take  by mouth. 2 tablespoons per day prn, Disp: , Rfl:   •  neomycin-polymyxin-pramoxine (ANTIBIOTIC PLUS PAIN RELIEF) 1 % cream, Apply  topically 4 (Four) Times a Day As Needed (prn rash or scraps)., Disp: 28 g, Rfl: 12  Review of Systems  Review of Systems   Constitutional: Negative for unexpected weight change.   HENT: Negative for trouble  "swallowing.    Cardiovascular: Negative for chest pain.   Gastrointestinal: Positive for abdominal pain, blood in stool, nausea and vomiting. Negative for abdominal distention, anal bleeding, constipation, diarrhea and rectal pain.   Genitourinary: Negative for difficulty urinating.   All other systems reviewed and are negative.         Objective    /76 (BP Location: Left arm)  Pulse 65  Ht 72\" (182.9 cm)  Wt 243 lb 3.2 oz (110 kg)  BMI 32.98 kg/m2  Physical Exam   Constitutional: He is oriented to person, place, and time. He appears well-developed and well-nourished. No distress.   HENT:   Head: Normocephalic and atraumatic.   Eyes: EOM are normal. Pupils are equal, round, and reactive to light.   Neck: Normal range of motion.   Cardiovascular: Normal rate, regular rhythm and normal heart sounds.    Pulmonary/Chest: Effort normal and breath sounds normal.   Abdominal: Soft. Bowel sounds are normal. He exhibits no shifting dullness, no distension, no abdominal bruit, no ascites and no mass. There is no hepatosplenomegaly. There is no tenderness. There is no rigidity, no rebound, no guarding and no CVA tenderness. No hernia. Hernia confirmed negative in the ventral area.   Musculoskeletal: Normal range of motion.   Neurological: He is alert and oriented to person, place, and time.   Skin: Skin is warm and dry.   Psychiatric: He has a normal mood and affect. His behavior is normal. Judgment and thought content normal.   Nursing note and vitals reviewed.    Assessment/Plan      1. Iron deficiency anemia due to chronic blood loss    2. Gastroesophageal reflux disease with esophagitis    3. Melena    .   Orlin was seen today for possible gi bleed.    Diagnoses and all orders for this visit:    Iron deficiency anemia due to chronic blood loss  -     Case Request; Standing  -     dextrose 5 % and sodium chloride 0.45 % infusion; Infuse 30 mL/hr into a venous catheter Continuous As Needed (Start Prior to " Procedure).  -     Case Request  -     Iron and TIBC  -     Ferritin  -     Hemoglobin & Hematocrit, Blood    Gastroesophageal reflux disease with esophagitis  -     Case Request; Standing  -     dextrose 5 % and sodium chloride 0.45 % infusion; Infuse 30 mL/hr into a venous catheter Continuous As Needed (Start Prior to Procedure).  -     Case Request  -     Iron and TIBC  -     Ferritin  -     Hemoglobin & Hematocrit, Blood    Melena  -     Case Request; Standing  -     dextrose 5 % and sodium chloride 0.45 % infusion; Infuse 30 mL/hr into a venous catheter Continuous As Needed (Start Prior to Procedure).  -     Case Request  -     Iron and TIBC  -     Ferritin  -     Hemoglobin & Hematocrit, Blood    Other orders  -     Obtain Informed Consent; Standing  -     Cancel: POC Glucose Fingerstick; Standing        Orders placed during this encounter include:  Orders Placed This Encounter   Procedures   • Iron and TIBC   • Ferritin   • Hemoglobin & Hematocrit, Blood       Medications prescribed:  No orders of the defined types were placed in this encounter.      Requested Prescriptions      No prescriptions requested or ordered in this encounter       Review and/or summary of lab tests, radiology, procedures, medications. Review and summary of old records and obtaining of history. The risks and benefits of my recommendations, as well as other treatment options were discussed with the patient today. Questions were answered.    Follow-up: Return in about 2 weeks (around 11/8/2017), or if symptoms worsen or fail to improve.     ESOPHAGOGASTRODUODENOSCOPY (N/A)      This document has been electronically signed by Bhargav Foreman PA-C on October 27, 2017 10:36 AM      Results for orders placed or performed in visit on 10/25/17   Iron and TIBC   Result Value Ref Range    Iron 47 (L) 49 - 181 mcg/dL    TIBC 333 261 - 462 mcg/dL    Iron Saturation 14 (L) 20 - 55 %   Hemoglobin & Hematocrit, Blood   Result Value Ref Range     Hemoglobin 10.5 (L) 13.7 - 17.3 g/dL    Hematocrit 30.3 (L) 39.0 - 49.0 %   Ferritin   Result Value Ref Range    Ferritin 32.70 17.90 - 464.00 ng/mL   Results for orders placed or performed in visit on 10/20/17   CBC Auto Differential   Result Value Ref Range    WBC 6.04 3.20 - 9.80 10*3/mm3    RBC 3.64 (L) 4.37 - 5.74 10*6/mm3    Hemoglobin 10.8 (L) 13.7 - 17.3 g/dL    Hematocrit 32.3 (L) 39.0 - 49.0 %    MCV 88.7 80.0 - 98.0 fL    MCH 29.7 26.5 - 34.0 pg    MCHC 33.4 31.5 - 36.3 g/dL    RDW 13.3 11.5 - 14.5 %    RDW-SD 42.6 35.1 - 43.9 fl    MPV 10.5 8.0 - 12.0 fL    Platelets 245 150 - 450 10*3/mm3    Neutrophil % 52.8 37.0 - 80.0 %    Lymphocyte % 32.8 10.0 - 50.0 %    Monocyte % 10.3 0.0 - 12.0 %    Eosinophil % 2.3 0.0 - 7.0 %    Basophil % 0.3 0.0 - 2.0 %    Immature Grans % 1.5 (H) 0.0 - 0.5 %    Neutrophils, Absolute 3.19 2.00 - 8.60 10*3/mm3    Lymphocytes, Absolute 1.98 0.60 - 4.20 10*3/mm3    Monocytes, Absolute 0.62 0.00 - 0.90 10*3/mm3    Eosinophils, Absolute 0.14 0.00 - 0.70 10*3/mm3    Basophils, Absolute 0.02 0.00 - 0.20 10*3/mm3    Immature Grans, Absolute 0.09 (H) 0.00 - 0.02 10*3/mm3   Comprehensive Metabolic Panel   Result Value Ref Range    Glucose 101 (H) 60 - 100 mg/dL    BUN 7 7 - 21 mg/dL    Creatinine 0.90 0.70 - 1.30 mg/dL    Sodium 139 137 - 145 mmol/L    Potassium 3.5 3.5 - 5.1 mmol/L    Chloride 100 95 - 110 mmol/L    CO2 28.0 22.0 - 31.0 mmol/L    Calcium 8.5 8.4 - 10.2 mg/dL    Total Protein 5.9 (L) 6.3 - 8.6 g/dL    Albumin 3.40 3.40 - 4.80 g/dL    ALT (SGPT) 47 21 - 72 U/L    AST (SGOT) 17 17 - 59 U/L    Alkaline Phosphatase 48 38 - 126 U/L    Total Bilirubin 0.2 0.2 - 1.3 mg/dL    eGFR Non  Amer 89 56 - 130 mL/min/1.73    Globulin 2.5 2.3 - 3.5 gm/dL    A/G Ratio 1.4 1.1 - 1.8 g/dL    BUN/Creatinine Ratio 7.8 7.0 - 25.0    Anion Gap 11.0 5.0 - 15.0 mmol/L   Results for orders placed or performed in visit on 05/25/17   PSA   Result Value Ref Range    PSA 0.293 0.000 - 4.000  ng/mL   Comprehensive metabolic panel   Result Value Ref Range    Glucose 90 60 - 100 mg/dL    BUN 11 7 - 21 mg/dL    Creatinine 0.95 0.70 - 1.30 mg/dL    Sodium 142 137 - 145 mmol/L    Potassium 3.9 3.5 - 5.1 mmol/L    Chloride 100 95 - 110 mmol/L    CO2 30.0 22.0 - 31.0 mmol/L    Calcium 9.2 8.4 - 10.2 mg/dL    Total Protein 6.8 6.3 - 8.6 g/dL    Albumin 3.90 3.40 - 4.80 g/dL    ALT (SGPT) 39 21 - 72 U/L    AST (SGOT) 22 17 - 59 U/L    Alkaline Phosphatase 52 38 - 126 U/L    Total Bilirubin 0.5 0.2 - 1.3 mg/dL    eGFR Non  Amer 84 56 - 130 mL/min/1.73    Globulin 2.9 2.3 - 3.5 gm/dL    A/G Ratio 1.3 1.1 - 1.8 g/dL    BUN/Creatinine Ratio 11.6 7.0 - 25.0    Anion Gap 12.0 5.0 - 15.0 mmol/L   Results for orders placed or performed in visit on 05/25/17   CBC Auto Differential   Result Value Ref Range    WBC 4.79 3.20 - 9.80 10*3/mm3    RBC 4.35 (L) 4.37 - 5.74 10*6/mm3    Hemoglobin 13.1 (L) 13.7 - 17.3 g/dL    Hematocrit 38.0 (L) 39.0 - 49.0 %    MCV 87.4 80.0 - 98.0 fL    MCH 30.1 26.5 - 34.0 pg    MCHC 34.5 31.5 - 36.3 g/dL    RDW 12.2 11.5 - 14.5 %    RDW-SD 39.4 35.1 - 43.9 fl    MPV 11.6 8.0 - 12.0 fL    Platelets 147 (L) 150 - 450 10*3/mm3    Neutrophil % 49.2 37.0 - 80.0 %    Lymphocyte % 35.1 10.0 - 50.0 %    Monocyte % 6.7 0.0 - 12.0 %    Eosinophil % 8.6 (H) 0.0 - 7.0 %    Basophil % 0.4 0.0 - 2.0 %    Immature Grans % 0.0 0.0 - 0.5 %    Neutrophils, Absolute 2.36 2.00 - 8.60 10*3/mm3    Lymphocytes, Absolute 1.68 0.60 - 4.20 10*3/mm3    Monocytes, Absolute 0.32 0.00 - 0.90 10*3/mm3    Eosinophils, Absolute 0.41 0.00 - 0.70 10*3/mm3    Basophils, Absolute 0.02 0.00 - 0.20 10*3/mm3    Immature Grans, Absolute 0.00 0.00 - 0.02 10*3/mm3   Results for orders placed or performed during the hospital encounter of 08/09/16   PSA   Result Value Ref Range    PSA 0.45 0.00 - 4.00 ng/ml   Comprehensive metabolic panel   Result Value Ref Range    Sodium 143 137 - 145 mmol/L    Potassium 4.1 3.5 - 5.1 mmol/L     Chloride 104 95 - 110 mmol/L    CO2 29 22 - 31 mmol/L    Anion Gap 10.0 5.0 - 15.0 mmol/L    Glucose 94 60 - 100 mg/dl    BUN 10 7 - 21 mg/dl    Creatinine 0.9 0.7 - 1.3 mg/dl    GFR MDRD Non  89 56 - 130 mL/min/1.73 sq.M    GFR MDRD  108 56 - 130 mL/min/1.73 sq.M    Calcium 8.9 8.4 - 10.2 mg/dl    Total Protein 7.0 6.3 - 8.6 gm/dl    Albumin 3.9 3.4 - 4.8 gm/dl    Total Bilirubin 0.3 0.2 - 1.3 mg/dl    Alkaline Phosphatase 56 38 - 126 U/L    AST (SGOT) 21 17 - 59 U/L    ALT (SGPT) 27 21 - 72 U/L     *Note: Due to a large number of results and/or encounters for the requested time period, some results have not been displayed. A complete set of results can be found in Results Review.       Some portions of this note have been dictated using voice recognition software and may contain errors and/or omissions.

## 2017-10-27 ENCOUNTER — ANESTHESIA (OUTPATIENT)
Dept: GASTROENTEROLOGY | Facility: HOSPITAL | Age: 52
End: 2017-10-27

## 2017-10-27 ENCOUNTER — ANESTHESIA EVENT (OUTPATIENT)
Dept: GASTROENTEROLOGY | Facility: HOSPITAL | Age: 52
End: 2017-10-27

## 2017-10-27 ENCOUNTER — HOSPITAL ENCOUNTER (OUTPATIENT)
Facility: HOSPITAL | Age: 52
Setting detail: HOSPITAL OUTPATIENT SURGERY
Discharge: HOME | End: 2017-10-27
Attending: INTERNAL MEDICINE | Admitting: INTERNAL MEDICINE

## 2017-10-27 VITALS
WEIGHT: 240 LBS | OXYGEN SATURATION: 98 % | HEIGHT: 71 IN | HEART RATE: 67 BPM | RESPIRATION RATE: 18 BRPM | DIASTOLIC BLOOD PRESSURE: 73 MMHG | BODY MASS INDEX: 33.6 KG/M2 | SYSTOLIC BLOOD PRESSURE: 115 MMHG | TEMPERATURE: 97.6 F

## 2017-10-27 DIAGNOSIS — D50.0 IRON DEFICIENCY ANEMIA DUE TO CHRONIC BLOOD LOSS: ICD-10-CM

## 2017-10-27 DIAGNOSIS — K92.1 MELENA: ICD-10-CM

## 2017-10-27 DIAGNOSIS — K21.00 GASTROESOPHAGEAL REFLUX DISEASE WITH ESOPHAGITIS: ICD-10-CM

## 2017-10-27 PROCEDURE — 88313 SPECIAL STAINS GROUP 2: CPT | Performed by: PATHOLOGY

## 2017-10-27 PROCEDURE — 88313 SPECIAL STAINS GROUP 2: CPT | Performed by: INTERNAL MEDICINE

## 2017-10-27 PROCEDURE — 88305 TISSUE EXAM BY PATHOLOGIST: CPT | Performed by: PATHOLOGY

## 2017-10-27 PROCEDURE — 88305 TISSUE EXAM BY PATHOLOGIST: CPT | Performed by: INTERNAL MEDICINE

## 2017-10-27 PROCEDURE — 25010000002 PROPOFOL 10 MG/ML EMULSION: Performed by: NURSE ANESTHETIST, CERTIFIED REGISTERED

## 2017-10-27 PROCEDURE — 43239 EGD BIOPSY SINGLE/MULTIPLE: CPT | Performed by: INTERNAL MEDICINE

## 2017-10-27 RX ORDER — DEXAMETHASONE SODIUM PHOSPHATE 4 MG/ML
8 INJECTION, SOLUTION INTRA-ARTICULAR; INTRALESIONAL; INTRAMUSCULAR; INTRAVENOUS; SOFT TISSUE ONCE AS NEEDED
Status: DISCONTINUED | OUTPATIENT
Start: 2017-10-27 | End: 2017-10-27 | Stop reason: HOSPADM

## 2017-10-27 RX ORDER — ONDANSETRON 2 MG/ML
4 INJECTION INTRAMUSCULAR; INTRAVENOUS ONCE AS NEEDED
Status: DISCONTINUED | OUTPATIENT
Start: 2017-10-27 | End: 2017-10-27 | Stop reason: HOSPADM

## 2017-10-27 RX ORDER — PROMETHAZINE HYDROCHLORIDE 25 MG/ML
12.5 INJECTION, SOLUTION INTRAMUSCULAR; INTRAVENOUS ONCE AS NEEDED
Status: DISCONTINUED | OUTPATIENT
Start: 2017-10-27 | End: 2017-10-27 | Stop reason: HOSPADM

## 2017-10-27 RX ORDER — DEXTROSE AND SODIUM CHLORIDE 5; .45 G/100ML; G/100ML
30 INJECTION, SOLUTION INTRAVENOUS CONTINUOUS PRN
Status: DISCONTINUED | OUTPATIENT
Start: 2017-10-27 | End: 2017-10-27 | Stop reason: HOSPADM

## 2017-10-27 RX ORDER — LIDOCAINE HYDROCHLORIDE 10 MG/ML
INJECTION, SOLUTION INFILTRATION; PERINEURAL AS NEEDED
Status: DISCONTINUED | OUTPATIENT
Start: 2017-10-27 | End: 2017-10-27 | Stop reason: SURG

## 2017-10-27 RX ORDER — PROPOFOL 10 MG/ML
VIAL (ML) INTRAVENOUS AS NEEDED
Status: DISCONTINUED | OUTPATIENT
Start: 2017-10-27 | End: 2017-10-27 | Stop reason: SURG

## 2017-10-27 RX ORDER — PROMETHAZINE HYDROCHLORIDE 25 MG/1
25 TABLET ORAL ONCE AS NEEDED
Status: DISCONTINUED | OUTPATIENT
Start: 2017-10-27 | End: 2017-10-27 | Stop reason: HOSPADM

## 2017-10-27 RX ORDER — PROMETHAZINE HYDROCHLORIDE 25 MG/1
25 SUPPOSITORY RECTAL ONCE AS NEEDED
Status: DISCONTINUED | OUTPATIENT
Start: 2017-10-27 | End: 2017-10-27 | Stop reason: HOSPADM

## 2017-10-27 RX ADMIN — LIDOCAINE HYDROCHLORIDE 100 MG: 10 INJECTION, SOLUTION INFILTRATION; PERINEURAL at 16:47

## 2017-10-27 RX ADMIN — PROPOFOL 100 MG: 10 INJECTION, EMULSION INTRAVENOUS at 16:47

## 2017-10-27 RX ADMIN — DEXTROSE AND SODIUM CHLORIDE 30 ML/HR: 5; 450 INJECTION, SOLUTION INTRAVENOUS at 16:14

## 2017-10-27 RX ADMIN — PROPOFOL 50 MG: 10 INJECTION, EMULSION INTRAVENOUS at 16:48

## 2017-10-27 NOTE — ANESTHESIA PREPROCEDURE EVALUATION
Anesthesia Evaluation     NPO Solid Status: > 8 hours  NPO Liquid Status: > 8 hours     Airway   Mallampati: III  Neck ROM: full  difficult intubation highly probable  Dental          Pulmonary - normal exam   Cardiovascular - normal exam        Neuro/Psych  GI/Hepatic/Renal/Endo      Musculoskeletal     Abdominal  - normal exam   Substance History      OB/GYN          Other                                        Anesthesia Plan    ASA 3     MAC     intravenous induction   Anesthetic plan and risks discussed with healthcare power of .

## 2017-10-27 NOTE — ANESTHESIA POSTPROCEDURE EVALUATION
Patient: Orlin Mendes    Procedure Summary     Date Anesthesia Start Anesthesia Stop Room / Location    10/27/17 [id number] [facility] ENDOSCOPY 1 / [facility] ENDOSCOPY       Procedure Diagnosis Surgeon Provider    ESOPHAGOGASTRODUODENOSCOPY (N/A Esophagus) Melena; Iron deficiency anemia due to chronic blood loss; Gastroesophageal reflux disease with esophagitis  (Melena [K92.1]; Iron deficiency anemia due to chronic blood loss [D50.0]; Gastroesophageal reflux disease with esophagitis [K21.0]) MD Tanya Abebe CRNA          Anesthesia Type: MAC  Last vitals  BP   135/90 (10/27/17 1546)   Temp   98 °F (36.7 °C) (10/27/17 1546)   Pulse   64 (10/27/17 1546)   Resp   18 (10/27/17 1546)     SpO2   97 % (10/27/17 1546)     Post Anesthesia Care and Evaluation    Patient location during evaluation: bedside  Patient participation: waiting for patient participation  Level of consciousness: responsive to verbal stimuli  Pain management: adequate  Airway patency: patent  Anesthetic complications: No anesthetic complications    Cardiovascular status: acceptable  Respiratory status: acceptable  Hydration status: acceptable

## 2017-10-31 LAB
LAB AP CASE REPORT: NORMAL
Lab: NORMAL
PATH REPORT.FINAL DX SPEC: NORMAL
PATH REPORT.GROSS SPEC: NORMAL

## 2017-11-07 ENCOUNTER — OFFICE VISIT (OUTPATIENT)
Dept: GASTROENTEROLOGY | Facility: CLINIC | Age: 52
End: 2017-11-07

## 2017-11-07 VITALS
SYSTOLIC BLOOD PRESSURE: 112 MMHG | HEART RATE: 65 BPM | WEIGHT: 239 LBS | HEIGHT: 71 IN | BODY MASS INDEX: 33.46 KG/M2 | DIASTOLIC BLOOD PRESSURE: 70 MMHG

## 2017-11-07 DIAGNOSIS — K21.00 GASTROESOPHAGEAL REFLUX DISEASE WITH ESOPHAGITIS: ICD-10-CM

## 2017-11-07 DIAGNOSIS — D50.0 IRON DEFICIENCY ANEMIA DUE TO CHRONIC BLOOD LOSS: Primary | ICD-10-CM

## 2017-11-07 PROCEDURE — 99214 OFFICE O/P EST MOD 30 MIN: CPT | Performed by: PHYSICIAN ASSISTANT

## 2017-11-07 RX ORDER — FERROUS SULFATE 325(65) MG
325 TABLET ORAL 2 TIMES DAILY WITH MEALS
Qty: 60 TABLET | Refills: 2 | Status: SHIPPED | OUTPATIENT
Start: 2017-11-07 | End: 2018-02-05 | Stop reason: SDUPTHER

## 2017-11-07 NOTE — PROGRESS NOTES
Chief Complaint   Patient presents with   • Anemia   • Heartburn   • Melena       ENDO PROCEDURE ORDERED:    Subjective    Orlin Mendes is a 52 y.o. male. he is here today for follow-up.    History of Present Illness    Patient seen on a recheck of his iron deficiency anemia, melena, GERD.  Last seen 10/25/17.  Patient's sister Marialuisa is in the room, caregiver is Clarissa also with the patient.  Laboratory from last visit H&H 10.5/30.3.  Ferritin 32.7.  Iron 47, 14% saturation.  Patient does drink a lot of coffee.  No indication of abdominal pain, nausea, vomiting, no apparent dysphagia.  He is on Nexium 40 mg daily.  He does take Imodium or milk of magnesia as needed for his bowels.  Weight is down 6.3 pounds since last visit.  They think Dr. Vázquez did his last colonoscopy and they think it was normal.    Patient underwent EGD on 10/27/17 showed esophagitis, diffuse gastritis.  Distal esophageal biopsy showed reactive change, negative for Gan's.  Antral biopsy showed reactive gastropathy.    A/P: Iron deficiency anemia.  Biopsy negative for Gan's, we'll try to get patient's colonoscopy report if he has not had one, would certainly need to have repeat colonoscopy.  We'll plan follow-up in 6 weeks with laboratory prior to include H&H, iron studies.    Note: After patient left we were able to obtain last colonoscopy done by Dr. Vázquez at [facility] on 9/19/14 showed mild diverticulosis of the sigmoid colon.  Polyp in the ascending colon, rectum, internal hemorrhoids.  Random biopsy from the cecum, ascending colon, transverse colon, descending colon, sigmoid colon were benign, negative for dysplasia and malignancy.  Hyperplastic polyp removed from the transverse colon.  Hyperplastic polyp removed from the rectum.  No evidence for colitis.     The following portions of the patient's history were reviewed and updated as appropriate:   Past Medical History:   Diagnosis Date   • Abdominal pain  09/23/2013   • Autistic disorder 06/18/2015   • Benign prostatic hyperplasia 06/18/2015    symptomatic for   • Disruptive behavior disorder 07/20/2015    improved with medication   • Dysphagia 08/11/2014   • GERD (gastroesophageal reflux disease) 08/11/2014   • Moderate mental retardation (I.Q. 35-49) 01/21/2016     Past Surgical History:   Procedure Laterality Date   • COLONOSCOPY     • ENDOSCOPY N/A 10/27/2017    Procedure: ESOPHAGOGASTRODUODENOSCOPY;  Surgeon: Jus Aguilar MD;  Location: [facility] ENDOSCOPY;  Service:    • UPPER GASTROINTESTINAL ENDOSCOPY     • UPPER GASTROINTESTINAL ENDOSCOPY  10/27/2017     History reviewed. No pertinent family history.    Allergies   Allergen Reactions   • Zithromax [Azithromycin]      Social History     Social History   • Marital status: Single     Spouse name: N/A   • Number of children: N/A   • Years of education: N/A     Social History Main Topics   • Smoking status: Never Smoker   • Smokeless tobacco: Never Used   • Alcohol use No   • Drug use: No   • Sexual activity: Not Asked     Other Topics Concern   • None     Social History Narrative       Current Outpatient Prescriptions:   •  acetaminophen (TYLENOL) 325 MG tablet, Take 2 tablets by mouth Every 4 (Four) Hours As Needed for Mild Pain (1-3)., Disp: 30 tablet, Rfl: 12  •  busPIRone (BUSPAR) 15 MG tablet, Take 15 mg by mouth 3 (Three) Times a Day., Disp: , Rfl:   •  chlorhexidine (PERIDEX) 0.12 % solution, Apply 15 mL to the mouth or throat 2 (Two) Times a Day., Disp: , Rfl:   •  citalopram (CeleXA) 20 MG tablet, Take 20 mg by mouth Daily., Disp: , Rfl:   •  Dextromethorphan-Guaifenesin 5-100 MG/5ML liquid, Take 1 teaspoon(s) by mouth 4 (Four) Times a Day As Needed (for cough)., Disp: 118 mL, Rfl: 12  •  esomeprazole (nexIUM) 40 MG capsule, Take 1 capsule by mouth Daily., Disp: 30 capsule, Rfl: 12  •  hydrocortisone 1 % ointment, Apply  topically 3 (Three) Times a Day. Apply to rash or scrapes, Disp: 56 g, Rfl: 12  •   "loperamide (IMODIUM) 2 MG capsule, Take 1 capsule by mouth 4 (Four) Times a Day As Needed for Diarrhea. Take 1 cap after each loose stool not to exceed 8 per day prn, Disp: 30 capsule, Rfl: 12  •  magnesium hydroxide (MILK OF MAGNESIA) 400 MG/5ML suspension, Take  by mouth. 2 tablespoons per day prn, Disp: , Rfl:   •  neomycin-polymyxin-pramoxine (ANTIBIOTIC PLUS PAIN RELIEF) 1 % cream, Apply  topically 4 (Four) Times a Day As Needed (prn rash or scraps)., Disp: 28 g, Rfl: 12  •  QUEtiapine (SEROquel) 100 MG tablet, Take 100 mg by mouth Every Night., Disp: , Rfl:   •  tamsulosin (FLOMAX) 0.4 MG capsule 24 hr capsule, Take 1 capsule by mouth Daily., Disp: 30 capsule, Rfl: 12  •  thioridazine (MELLARIL) 25 MG tablet, Take 1 tablet by mouth 3 (Three) Times a Day. 1 tab in AM, 1 tab at noon and 2 tabs in PM, Disp: 90 tablet, Rfl: 12  •  ferrous sulfate 325 (65 FE) MG tablet, Take 1 tablet by mouth 2 (Two) Times a Day With Meals., Disp: 60 tablet, Rfl: 2  Review of Systems  Review of Systems       Objective    /70 (BP Location: Left arm)  Pulse 65  Ht 71\" (180.3 cm)  Wt 239 lb (108 kg)  BMI 33.33 kg/m2  Physical Exam   Constitutional: He is oriented to person, place, and time. He appears well-developed and well-nourished. No distress.   HENT:   Head: Normocephalic and atraumatic.   Eyes: EOM are normal. Pupils are equal, round, and reactive to light.   Neck: Normal range of motion.   Cardiovascular: Normal rate, regular rhythm and normal heart sounds.    Pulmonary/Chest: Effort normal and breath sounds normal.   Abdominal: Soft. Bowel sounds are normal. He exhibits no shifting dullness, no distension, no abdominal bruit, no ascites and no mass. There is no hepatosplenomegaly. There is no tenderness. There is no rigidity, no rebound, no guarding and no CVA tenderness. No hernia. Hernia confirmed negative in the ventral area.   Musculoskeletal: Normal range of motion.   Neurological: He is alert and oriented to " person, place, and time.   Skin: Skin is warm and dry.   Psychiatric: He has a normal mood and affect. His behavior is normal. Judgment and thought content normal.   Nursing note and vitals reviewed.    Assessment/Plan      1. Iron deficiency anemia due to chronic blood loss    2. Gastroesophageal reflux disease with esophagitis    .   Orlin was seen today for anemia, heartburn and melena.    Diagnoses and all orders for this visit:    Iron deficiency anemia due to chronic blood loss  -     Hemoglobin & Hematocrit, Blood  -     Iron and TIBC  -     Ferritin    Gastroesophageal reflux disease with esophagitis    Other orders  -     ferrous sulfate 325 (65 FE) MG tablet; Take 1 tablet by mouth 2 (Two) Times a Day With Meals.        Orders placed during this encounter include:  Orders Placed This Encounter   Procedures   • Hemoglobin & Hematocrit, Blood   • Iron and TIBC   • Ferritin       Medications prescribed:  New Medications Ordered This Visit   Medications   • ferrous sulfate 325 (65 FE) MG tablet     Sig: Take 1 tablet by mouth 2 (Two) Times a Day With Meals.     Dispense:  60 tablet     Refill:  2       Requested Prescriptions     Signed Prescriptions Disp Refills   • ferrous sulfate 325 (65 FE) MG tablet 60 tablet 2     Sig: Take 1 tablet by mouth 2 (Two) Times a Day With Meals.       Review and/or summary of lab tests, radiology, procedures, medications. Review and summary of old records and obtaining of history. The risks and benefits of my recommendations, as well as other treatment options were discussed with the patient today. Questions were answered.    Follow-up: Return in about 6 weeks (around 12/19/2017) for lab prior.     * Surgery not found *      This document has been electronically signed by Bhargav Foreman PA-C on November 30, 2017 6:23 PM      Results for orders placed or performed during the hospital encounter of 10/27/17   Tissue Pathology Exam - Tissue, Gastric, Antrum   Result Value Ref  "Range    Case Report       Surgical Pathology Report                         Case: [id number]                                  Authorizing Provider:  Jus Aguilar MD         Collected:           10/27/2017 04:52 PM          Ordering Location:     [facility]             Received:            10/30/2017 07:48 AM                                 [facility]                                                     Pathologist:           Liam Evangelista MD                                                          Specimens:   1) - Gastric, Antrum, antrum bx                                                                     2) - Esophagus, Distal, distal esophagus bx                                                Final Diagnosis       1.  MUCOSA, ANTRUM OF STOMACH:  REACTIVE GASTROPATHY.    2.  MUCOSA, DISTAL ESOPHAGUS:  REACTIVE CHANGE OF SQUAMOUS MUCOSA AND CARDIAC GASTRIC MUCOSA.  NEGATIVE FOR DYSPLASIA AND GOBLET CELL METAPLASIA (ALCIAN BLUE/PAS STAIN).      Gross Description       1.  The first container is labeled \"antrum of stomach\" and has nodular bits of white soft tissue measuring 0.4 cc in aggregate.  The entire specimen is embedded as 1A.     2.  The second container is labeled \"distal esophagus\" and has a nodular fragment of white soft tissue measuring 0.2 cm in greatest dimension.  The entire specimen is embedded as 2A.       Embedded Images     Results for orders placed or performed in visit on 10/25/17   Iron and TIBC   Result Value Ref Range    Iron 47 (L) 49 - 181 mcg/dL    TIBC 333 261 - 462 mcg/dL    Iron Saturation 14 (L) 20 - 55 %   Hemoglobin & Hematocrit, Blood   Result Value Ref Range    Hemoglobin 10.5 (L) 13.7 - 17.3 g/dL    Hematocrit 30.3 (L) 39.0 - 49.0 %   Ferritin   Result Value Ref Range    Ferritin 32.70 17.90 - 464.00 ng/mL   Results for orders placed or performed in visit on 10/20/17   CBC Auto Differential   Result Value Ref Range    WBC 6.04 3.20 - 9.80 10*3/mm3    RBC " 3.64 (L) 4.37 - 5.74 10*6/mm3    Hemoglobin 10.8 (L) 13.7 - 17.3 g/dL    Hematocrit 32.3 (L) 39.0 - 49.0 %    MCV 88.7 80.0 - 98.0 fL    MCH 29.7 26.5 - 34.0 pg    MCHC 33.4 31.5 - 36.3 g/dL    RDW 13.3 11.5 - 14.5 %    RDW-SD 42.6 35.1 - 43.9 fl    MPV 10.5 8.0 - 12.0 fL    Platelets 245 150 - 450 10*3/mm3    Neutrophil % 52.8 37.0 - 80.0 %    Lymphocyte % 32.8 10.0 - 50.0 %    Monocyte % 10.3 0.0 - 12.0 %    Eosinophil % 2.3 0.0 - 7.0 %    Basophil % 0.3 0.0 - 2.0 %    Immature Grans % 1.5 (H) 0.0 - 0.5 %    Neutrophils, Absolute 3.19 2.00 - 8.60 10*3/mm3    Lymphocytes, Absolute 1.98 0.60 - 4.20 10*3/mm3    Monocytes, Absolute 0.62 0.00 - 0.90 10*3/mm3    Eosinophils, Absolute 0.14 0.00 - 0.70 10*3/mm3    Basophils, Absolute 0.02 0.00 - 0.20 10*3/mm3    Immature Grans, Absolute 0.09 (H) 0.00 - 0.02 10*3/mm3   Comprehensive Metabolic Panel   Result Value Ref Range    Glucose 101 (H) 60 - 100 mg/dL    BUN 7 7 - 21 mg/dL    Creatinine 0.90 0.70 - 1.30 mg/dL    Sodium 139 137 - 145 mmol/L    Potassium 3.5 3.5 - 5.1 mmol/L    Chloride 100 95 - 110 mmol/L    CO2 28.0 22.0 - 31.0 mmol/L    Calcium 8.5 8.4 - 10.2 mg/dL    Total Protein 5.9 (L) 6.3 - 8.6 g/dL    Albumin 3.40 3.40 - 4.80 g/dL    ALT (SGPT) 47 21 - 72 U/L    AST (SGOT) 17 17 - 59 U/L    Alkaline Phosphatase 48 38 - 126 U/L    Total Bilirubin 0.2 0.2 - 1.3 mg/dL    eGFR Non  Amer 89 56 - 130 mL/min/1.73    Globulin 2.5 2.3 - 3.5 gm/dL    A/G Ratio 1.4 1.1 - 1.8 g/dL    BUN/Creatinine Ratio 7.8 7.0 - 25.0    Anion Gap 11.0 5.0 - 15.0 mmol/L   Results for orders placed or performed in visit on 05/25/17   PSA   Result Value Ref Range    PSA 0.293 0.000 - 4.000 ng/mL   Comprehensive metabolic panel   Result Value Ref Range    Glucose 90 60 - 100 mg/dL    BUN 11 7 - 21 mg/dL    Creatinine 0.95 0.70 - 1.30 mg/dL    Sodium 142 137 - 145 mmol/L    Potassium 3.9 3.5 - 5.1 mmol/L    Chloride 100 95 - 110 mmol/L    CO2 30.0 22.0 - 31.0 mmol/L    Calcium 9.2 8.4  - 10.2 mg/dL    Total Protein 6.8 6.3 - 8.6 g/dL    Albumin 3.90 3.40 - 4.80 g/dL    ALT (SGPT) 39 21 - 72 U/L    AST (SGOT) 22 17 - 59 U/L    Alkaline Phosphatase 52 38 - 126 U/L    Total Bilirubin 0.5 0.2 - 1.3 mg/dL    eGFR Non  Amer 84 56 - 130 mL/min/1.73    Globulin 2.9 2.3 - 3.5 gm/dL    A/G Ratio 1.3 1.1 - 1.8 g/dL    BUN/Creatinine Ratio 11.6 7.0 - 25.0    Anion Gap 12.0 5.0 - 15.0 mmol/L   Results for orders placed or performed in visit on 05/25/17   CBC Auto Differential   Result Value Ref Range    WBC 4.79 3.20 - 9.80 10*3/mm3    RBC 4.35 (L) 4.37 - 5.74 10*6/mm3    Hemoglobin 13.1 (L) 13.7 - 17.3 g/dL    Hematocrit 38.0 (L) 39.0 - 49.0 %    MCV 87.4 80.0 - 98.0 fL    MCH 30.1 26.5 - 34.0 pg    MCHC 34.5 31.5 - 36.3 g/dL    RDW 12.2 11.5 - 14.5 %    RDW-SD 39.4 35.1 - 43.9 fl    MPV 11.6 8.0 - 12.0 fL    Platelets 147 (L) 150 - 450 10*3/mm3    Neutrophil % 49.2 37.0 - 80.0 %    Lymphocyte % 35.1 10.0 - 50.0 %    Monocyte % 6.7 0.0 - 12.0 %    Eosinophil % 8.6 (H) 0.0 - 7.0 %    Basophil % 0.4 0.0 - 2.0 %    Immature Grans % 0.0 0.0 - 0.5 %    Neutrophils, Absolute 2.36 2.00 - 8.60 10*3/mm3    Lymphocytes, Absolute 1.68 0.60 - 4.20 10*3/mm3    Monocytes, Absolute 0.32 0.00 - 0.90 10*3/mm3    Eosinophils, Absolute 0.41 0.00 - 0.70 10*3/mm3    Basophils, Absolute 0.02 0.00 - 0.20 10*3/mm3    Immature Grans, Absolute 0.00 0.00 - 0.02 10*3/mm3   Results for orders placed or performed during the hospital encounter of 08/09/16   PSA   Result Value Ref Range    PSA 0.45 0.00 - 4.00 ng/ml   Comprehensive metabolic panel   Result Value Ref Range    Sodium 143 137 - 145 mmol/L    Potassium 4.1 3.5 - 5.1 mmol/L    Chloride 104 95 - 110 mmol/L    CO2 29 22 - 31 mmol/L    Anion Gap 10.0 5.0 - 15.0 mmol/L    Glucose 94 60 - 100 mg/dl    BUN 10 7 - 21 mg/dl    Creatinine 0.9 0.7 - 1.3 mg/dl    GFR MDRD Non  89 56 - 130 mL/min/1.73 sq.M    GFR MDRD  108 56 - 130 mL/min/1.73 sq.M     Calcium 8.9 8.4 - 10.2 mg/dl    Total Protein 7.0 6.3 - 8.6 gm/dl    Albumin 3.9 3.4 - 4.8 gm/dl    Total Bilirubin 0.3 0.2 - 1.3 mg/dl    Alkaline Phosphatase 56 38 - 126 U/L    AST (SGOT) 21 17 - 59 U/L    ALT (SGPT) 27 21 - 72 U/L     *Note: Due to a large number of results and/or encounters for the requested time period, some results have not been displayed. A complete set of results can be found in Results Review.       Some portions of this note have been dictated using voice recognition software and may contain errors and/or omissions.

## 2017-11-10 ENCOUNTER — OFFICE VISIT (OUTPATIENT)
Dept: FAMILY MEDICINE CLINIC | Facility: CLINIC | Age: 52
End: 2017-11-10

## 2017-11-10 VITALS
BODY MASS INDEX: 33.73 KG/M2 | SYSTOLIC BLOOD PRESSURE: 122 MMHG | DIASTOLIC BLOOD PRESSURE: 86 MMHG | WEIGHT: 240.9 LBS | HEIGHT: 71 IN | OXYGEN SATURATION: 98 % | HEART RATE: 66 BPM | RESPIRATION RATE: 18 BRPM | TEMPERATURE: 97.8 F

## 2017-11-10 DIAGNOSIS — F79 MENTAL RETARDATION: ICD-10-CM

## 2017-11-10 DIAGNOSIS — K21.00 GASTROESOPHAGEAL REFLUX DISEASE WITH ESOPHAGITIS: Primary | ICD-10-CM

## 2017-11-10 PROCEDURE — 99213 OFFICE O/P EST LOW 20 MIN: CPT | Performed by: NURSE PRACTITIONER

## 2017-11-10 NOTE — PROGRESS NOTES
Subjective   Orlin Mendes is a 52 y.o. male.     HPI Comments: Here today for shahriar.  Was brought in by caregiver.  Has had his upper gi and bleeding was not found.  He however has had low blood count with appetite that has been off.  Sent him for upper gi to further eval.    Abdominal Pain   This is a recurrent (appetitie seems to have picked up and weight has stabilized.) problem. The current episode started more than 1 month ago. Episode frequency: seems to have gotten better. The problem has been waxing and waning. The pain is located in the generalized abdominal region. The pain is at a severity of 2/10. The pain is mild. The quality of the pain is aching. Pertinent negatives include no anorexia, arthralgias, belching, constipation, diarrhea, dysuria, fever, flatus, frequency, headaches, hematochezia, hematuria, melena, myalgias, nausea, vomiting or weight loss. The pain is aggravated by eating. The pain is relieved by nothing. He has tried H2 blockers for the symptoms. The treatment provided mild relief. Prior diagnostic workup includes upper endoscopy.        The following portions of the patient's history were reviewed and updated as appropriate: allergies, current medications, past family history, past medical history, past social history, past surgical history and problem list.    Review of Systems   Constitutional: Negative.  Negative for fever and weight loss.   HENT: Negative.    Respiratory: Negative.    Cardiovascular: Negative.    Gastrointestinal: Positive for abdominal pain. Negative for anorexia, constipation, diarrhea, flatus, hematochezia, melena, nausea and vomiting.   Genitourinary: Negative for dysuria, frequency and hematuria.   Musculoskeletal: Negative.  Negative for arthralgias and myalgias.   Skin: Negative.    Neurological: Negative.  Negative for headaches.   Psychiatric/Behavioral: Negative.        Objective   Physical Exam   Constitutional: He is oriented to person, place, and  time. He appears well-developed and well-nourished. No distress.   HENT:   Head: Normocephalic.   Eyes: Pupils are equal, round, and reactive to light.   Neck: Normal range of motion. Neck supple. No thyromegaly present.   Cardiovascular: Normal rate, regular rhythm and normal heart sounds.  Exam reveals no friction rub.    No murmur heard.  Pulmonary/Chest: Effort normal and breath sounds normal. No respiratory distress. He has no wheezes. He has no rales.   Abdominal: Soft.   Musculoskeletal: Normal range of motion.   Neurological: He is alert and oriented to person, place, and time.   Skin: Skin is warm and dry.   Psychiatric: He has a normal mood and affect. Thought content normal.   Nursing note and vitals reviewed.      Assessment/Plan   Orlin was seen today for follow-up.    Diagnoses and all orders for this visit:    Gastroesophageal reflux disease with esophagitis    Mental retardation      He is due to have his labs drawn again.  He will also be seeing gastro next month also.

## 2017-11-10 NOTE — PATIENT INSTRUCTIONS
Calorie Counting for Weight Loss  Calories are energy you get from the things you eat and drink. Your body uses this energy to keep you going throughout the day. The number of calories you eat affects your weight. When you eat more calories than your body needs, your body stores the extra calories as fat. When you eat fewer calories than your body needs, your body burns fat to get the energy it needs.  Calorie counting means keeping track of how many calories you eat and drink each day. If you make sure to eat fewer calories than your body needs, you should lose weight. In order for calorie counting to work, you will need to eat the number of calories that are right for you in a day to lose a healthy amount of weight per week. A healthy amount of weight to lose per week is usually 1-2 lb (0.5-0.9 kg). A dietitian can determine how many calories you need in a day and give you suggestions on how to reach your calorie goal.   WHAT IS MY MY PLAN?  My goal is to have __________ calories per day.   If I have this many calories per day, I should lose around __________ pounds per week.  WHAT DO I NEED TO KNOW ABOUT CALORIE COUNTING?  In order to meet your daily calorie goal, you will need to:  · Find out how many calories are in each food you would like to eat. Try to do this before you eat.  · Decide how much of the food you can eat.  · Write down what you ate and how many calories it had. Doing this is called keeping a food log.  WHERE DO I FIND CALORIE INFORMATION?  The number of calories in a food can be found on a Nutrition Facts label. Note that all the information on a label is based on a specific serving of the food. If a food does not have a Nutrition Facts label, try to look up the calories online or ask your dietitian for help.  HOW DO I DECIDE HOW MUCH TO EAT?  To decide how much of the food you can eat, you will need to consider both the number of calories in one serving and the size of one serving. This  information can be found on the Nutrition Facts label. If a food does not have a Nutrition Facts label, look up the information online or ask your dietitian for help.  Remember that calories are listed per serving. If you choose to have more than one serving of a food, you will have to multiply the calories per serving by the amount of servings you plan to eat. For example, the label on a package of bread might say that a serving size is 1 slice and that there are 90 calories in a serving. If you eat 1 slice, you will have eaten 90 calories. If you eat 2 slices, you will have eaten 180 calories.  HOW DO I KEEP A FOOD LOG?  After each meal, record the following information in your food log:  · What you ate.  · How much of it you ate.  · How many calories it had.  · Then, add up your calories.  Keep your food log near you, such as in a small notebook in your pocket. Another option is to use a mobile jg or website. Some programs will calculate calories for you and show you how many calories you have left each time you add an item to the log.  WHAT ARE SOME CALORIE COUNTING TIPS?  · Use your calories on foods and drinks that will fill you up and not leave you hungry. Some examples of this include foods like nuts and nut butters, vegetables, lean proteins, and high-fiber foods (more than 5 g fiber per serving).  · Eat nutritious foods and avoid empty calories. Empty calories are calories you get from foods or beverages that do not have many nutrients, such as candy and soda. It is better to have a nutritious high-calorie food (such as an avocado) than a food with few nutrients (such as a bag of chips).  · Know how many calories are in the foods you eat most often. This way, you do not have to look up how many calories they have each time you eat them.  · Look out for foods that may seem like low-calorie foods but are really high-calorie foods, such as baked goods, soda, and fat-free candy.  · Pay attention to calories  in drinks. Drinks such as sodas, specialty coffee drinks, alcohol, and juices have a lot of calories yet do not fill you up. Choose low-calorie drinks like water and diet drinks.  · Focus your calorie counting efforts on higher calorie items. Logging the calories in a garden salad that contains only vegetables is less important than calculating the calories in a milk shake.  · Find a way of tracking calories that works for you. Get creative. Most people who are successful find ways to keep track of how much they eat in a day, even if they do not count every calorie.  WHAT ARE SOME PORTION CONTROL TIPS?  · Know how many calories are in a serving. This will help you know how many servings of a certain food you can have.  · Use a measuring cup to measure serving sizes. This is helpful when you start out. With time, you will be able to estimate serving sizes for some foods.  · Take some time to put servings of different foods on your favorite plates, bowls, and cups so you know what a serving looks like.  · Try not to eat straight from a bag or box. Doing this can lead to overeating. Put the amount you would like to eat in a cup or on a plate to make sure you are eating the right portion.  · Use smaller plates, glasses, and bowls to prevent overeating. This is a quick and easy way to practice portion control. If your plate is smaller, less food can fit on it.  · Try not to multitask while eating, such as watching TV or using your computer. If it is time to eat, sit down at a table and enjoy your food. Doing this will help you to start recognizing when you are full. It will also make you more aware of what and how much you are eating.  HOW CAN I CALORIE COUNT WHEN EATING OUT?  · Ask for smaller portion sizes or child-sized portions.  · Consider sharing an entree and sides instead of getting your own entree.  · If you get your own entree, eat only half. Ask for a box at the beginning of your meal and put the rest of your  "entree in it so you are not tempted to eat it.  · Look for the calories on the menu. If calories are listed, choose the lower calorie options.  · Choose dishes that include vegetables, fruits, whole grains, low-fat dairy products, and lean protein. Focusing on smart food choices from each of the 5 food groups can help you stay on track at restaurants.  · Choose items that are boiled, broiled, grilled, or steamed.  · Choose water, milk, unsweetened iced tea, or other drinks without added sugars. If you want an alcoholic beverage, choose a lower calorie option. For example, a regular shaka can have up to 700 calories and a glass of wine has around 150.  · Stay away from items that are buttered, battered, fried, or served with cream sauce. Items labeled \"crispy\" are usually fried, unless stated otherwise.  · Ask for dressings, sauces, and syrups on the side. These are usually very high in calories, so do not eat much of them.  · Watch out for salads. Many people think salads are a healthy option, but this is often not the case. Many salads come with noble, fried chicken, lots of cheese, fried chips, and dressing. All of these items have a lot of calories. If you want a salad, choose a garden salad and ask for grilled meats or steak. Ask for the dressing on the side, or ask for olive oil and vinegar or lemon to use as dressing.  · Estimate how many servings of a food you are given. For example, a serving of cooked rice is ½ cup or about the size of half a tennis ball or one cupcake wrapper. Knowing serving sizes will help you be aware of how much food you are eating at restaurants. The list below tells you how big or small some common portion sizes are based on everyday objects.    1 oz--4 stacked dice.    3 oz--1 deck of cards.    1 tsp--1 dice.    1 Tbsp--½ a Ping-Pong ball.    2 Tbsp--1 Ping-Pong ball.    ½ cup--1 tennis ball or 1 cupcake wrapper.    1 cup--1 baseball.     This information is not intended to " replace advice given to you by your health care provider. Make sure you discuss any questions you have with your health care provider.     Document Released: 12/18/2006 Document Revised: 01/08/2016 Document Reviewed: 10/23/2014  [employer] Interactive Patient Education ©2017 [employer]

## 2017-12-08 ENCOUNTER — OFFICE VISIT (OUTPATIENT)
Dept: PODIATRY | Facility: CLINIC | Age: 52
End: 2017-12-08

## 2017-12-08 VITALS — BODY MASS INDEX: 32.72 KG/M2 | WEIGHT: 233.69 LBS | HEIGHT: 71 IN | HEART RATE: 66 BPM | OXYGEN SATURATION: 98 %

## 2017-12-08 DIAGNOSIS — Q82.8 PLANTAR KERATOSIS: ICD-10-CM

## 2017-12-08 DIAGNOSIS — L84 CALLUS: ICD-10-CM

## 2017-12-08 DIAGNOSIS — B35.1 ONYCHOMYCOSIS: ICD-10-CM

## 2017-12-08 DIAGNOSIS — M79.675 CHRONIC TOE PAIN, BILATERAL: ICD-10-CM

## 2017-12-08 DIAGNOSIS — F79 MENTAL RETARDATION: Primary | ICD-10-CM

## 2017-12-08 DIAGNOSIS — G89.29 CHRONIC TOE PAIN, BILATERAL: ICD-10-CM

## 2017-12-08 DIAGNOSIS — M79.674 CHRONIC TOE PAIN, BILATERAL: ICD-10-CM

## 2017-12-08 LAB
FERRITIN SERPL-MCNC: 26.7 NG/ML (ref 17.9–464)
HCT VFR BLD AUTO: 38.4 % (ref 39–49)
HGB BLD-MCNC: 13.1 G/DL (ref 13.7–17.3)
IRON 24H UR-MRATE: 31 MCG/DL (ref 49–181)
IRON SATN MFR SERPL: 8 % (ref 20–55)
TIBC SERPL-MCNC: 375 MCG/DL (ref 261–462)

## 2017-12-08 PROCEDURE — 83540 ASSAY OF IRON: CPT | Performed by: PHYSICIAN ASSISTANT

## 2017-12-08 PROCEDURE — 85018 HEMOGLOBIN: CPT | Performed by: PHYSICIAN ASSISTANT

## 2017-12-08 PROCEDURE — 11056 PARNG/CUTG B9 HYPRKR LES 2-4: CPT | Performed by: PODIATRIST

## 2017-12-08 PROCEDURE — 11721 DEBRIDE NAIL 6 OR MORE: CPT | Performed by: PODIATRIST

## 2017-12-08 PROCEDURE — 83550 IRON BINDING TEST: CPT | Performed by: PHYSICIAN ASSISTANT

## 2017-12-08 PROCEDURE — 36415 COLL VENOUS BLD VENIPUNCTURE: CPT | Performed by: FAMILY MEDICINE

## 2017-12-08 PROCEDURE — 85014 HEMATOCRIT: CPT | Performed by: PHYSICIAN ASSISTANT

## 2017-12-08 PROCEDURE — 82728 ASSAY OF FERRITIN: CPT | Performed by: PHYSICIAN ASSISTANT

## 2017-12-08 NOTE — PROGRESS NOTES
Orlin Boousama  1965  52 y.o. male     Patient presents today with a care giver for nail care.    12/8/2017  Chief Complaint   Patient presents with   • Left Foot - routine nail trim   • Right Foot - routine nail trim           History of Present Illness    Patient presents to clinic today for routine footcare.  He is accompanied by his caregiver.  Patient relates to painful, elongated and discolored toenails.  Pain is relieved debridement.  He also complains of painful calluses on the bottom of both feet.  He has no other pedal complaints.        Past Medical History:   Diagnosis Date   • Abdominal pain 09/23/2013   • Autistic disorder 06/18/2015   • Benign prostatic hyperplasia 06/18/2015    symptomatic for   • Disruptive behavior disorder 07/20/2015    improved with medication   • Dysphagia 08/11/2014   • GERD (gastroesophageal reflux disease) 08/11/2014   • Moderate mental retardation (I.Q. 35-49) 01/21/2016         Past Surgical History:   Procedure Laterality Date   • COLONOSCOPY     • ENDOSCOPY N/A 10/27/2017    Procedure: ESOPHAGOGASTRODUODENOSCOPY;  Surgeon: Jus Aguilar MD;  Location: [facility] ENDOSCOPY;  Service:    • UPPER GASTROINTESTINAL ENDOSCOPY     • UPPER GASTROINTESTINAL ENDOSCOPY  10/27/2017         Family History   Problem Relation Age of Onset   • Heart disease Mother    • Diabetes Mother    • Heart disease Father    • Heart disease Sister    • No Known Problems Brother    • No Known Problems Maternal Grandmother    • No Known Problems Maternal Grandfather    • No Known Problems Paternal Grandmother    • No Known Problems Paternal Grandfather    • No Known Problems Sister    • No Known Problems Sister          Social History     Social History   • Marital status: Single     Spouse name: N/A   • Number of children: N/A   • Years of education: N/A     Occupational History   • Not on file.     Social History Main Topics   • Smoking status: Never Smoker   • Smokeless tobacco: Never Used  "  • Alcohol use No   • Drug use: No   • Sexual activity: Not on file     Other Topics Concern   • Not on file     Social History Narrative         Current Outpatient Prescriptions   Medication Sig Dispense Refill   • acetaminophen (TYLENOL) 325 MG tablet Take 2 tablets by mouth Every 4 (Four) Hours As Needed for Mild Pain (1-3). 30 tablet 12   • busPIRone (BUSPAR) 15 MG tablet Take 15 mg by mouth 3 (Three) Times a Day.     • chlorhexidine (PERIDEX) 0.12 % solution Apply 15 mL to the mouth or throat 2 (Two) Times a Day.     • citalopram (CeleXA) 20 MG tablet Take 20 mg by mouth Daily.     • Dextromethorphan-Guaifenesin 5-100 MG/5ML liquid Take 1 teaspoon(s) by mouth 4 (Four) Times a Day As Needed (for cough). 118 mL 12   • esomeprazole (nexIUM) 40 MG capsule Take 1 capsule by mouth Daily. 30 capsule 12   • ferrous sulfate 325 (65 FE) MG tablet Take 1 tablet by mouth 2 (Two) Times a Day With Meals. 60 tablet 2   • hydrocortisone 1 % ointment Apply  topically 3 (Three) Times a Day. Apply to rash or scrapes 56 g 12   • loperamide (IMODIUM) 2 MG capsule Take 1 capsule by mouth 4 (Four) Times a Day As Needed for Diarrhea. Take 1 cap after each loose stool not to exceed 8 per day prn 30 capsule 12   • magnesium hydroxide (MILK OF MAGNESIA) 400 MG/5ML suspension Take  by mouth. 2 tablespoons per day prn     • neomycin-polymyxin-pramoxine (ANTIBIOTIC PLUS PAIN RELIEF) 1 % cream Apply  topically 4 (Four) Times a Day As Needed (prn rash or scraps). 28 g 12   • QUEtiapine (SEROquel) 100 MG tablet Take 100 mg by mouth Every Night.     • tamsulosin (FLOMAX) 0.4 MG capsule 24 hr capsule Take 1 capsule by mouth Daily. 30 capsule 12   • thioridazine (MELLARIL) 25 MG tablet Take 1 tablet by mouth 3 (Three) Times a Day. 1 tab in AM, 1 tab at noon and 2 tabs in PM 90 tablet 12     No current facility-administered medications for this visit.          OBJECTIVE    Pulse 66  Ht 180.3 cm (70.98\")  Wt 106 kg (233 lb 11 oz)  SpO2 98%  " BMI 32.61 kg/m2      Review of Systems   Constitutional: Negative for chills and fever.   Cardiovascular: Negative for chest pain.   Gastrointestinal: Negative for constipation, diarrhea, nausea and vomiting.   Skin: Negative for wound. long painful toenails, callus Both feetkeletal: foot pain      Constitutional: well developed, well nourished    HEENT: Normocephalic and atraumatic, normal hearing    Respiratory: Non labored respirations noted    Cardiovascular:    DP/PT pulses palpable    CFT brisk  to all digits  Skin temp is warm to warm from proximal tibia to distal digits  Pedal hair growth present.   No erythema or edema noted     Musculoskeletal:  Muscle strength is 5/5 for all muscle groups tested   ROM of the 1st MTP is full without pain or crepitus  ROM of the MTJ is full without pain or crepitus    ROM of the STJ is full without pain or crepitus    ROM of the ankle joint is full without pain or crepitus        Dermatological:   Nails 2-5 are are discolored and elongated. Bilateral hallux nails are thickened, discolored, elongated with subungual hematoma and debris.  there is pain on palpation to the toenails  Skin is warm, dry and intact    Webspaces 1-4 bilateral are clean, dry and intact.   No subcutaneous nodules or masses noted    No open wounds noted   Hyperkeratotic lesion noted to the plantar aspect of the left fifth metatarsal head.   hyperkeratotic lesion noted to the plantar aspect of the right foot sub-fourth metatarsal head.      Neurological:   Protective sensation intact    Sensation intact to light touch    DTR intact    Psychiatric: A&O x 3 with normal mood and affect. NAD.         Procedures        ASSESSMENT AND PLAN    Orlin was seen today for routine nail trim and routine nail trim.    Diagnoses and all orders for this visit:    Mental retardation    Onychomycosis    Plantar keratosis    Chronic toe pain, bilateral    Callus    - Nails 1-5 bilateral were debrided in length and  thickness with nail nipper and electric  to decrease fungal load and risk of infection.  - Trimmed hyperkeratotic lesions noted in objective with a #15 blade without incident.  - All questions were answered   - RTC 3 months          This document has been electronically signed by Brian Cash DPM on December 8, 2017 3:04 PM     12/8/2017  3:04 PM

## 2017-12-19 ENCOUNTER — OFFICE VISIT (OUTPATIENT)
Dept: GASTROENTEROLOGY | Facility: CLINIC | Age: 52
End: 2017-12-19

## 2017-12-19 VITALS
HEIGHT: 71 IN | DIASTOLIC BLOOD PRESSURE: 82 MMHG | HEART RATE: 75 BPM | WEIGHT: 245 LBS | BODY MASS INDEX: 34.3 KG/M2 | SYSTOLIC BLOOD PRESSURE: 124 MMHG

## 2017-12-19 DIAGNOSIS — Z86.010 HISTORY OF COLON POLYPS: ICD-10-CM

## 2017-12-19 DIAGNOSIS — D50.0 IRON DEFICIENCY ANEMIA DUE TO CHRONIC BLOOD LOSS: Primary | ICD-10-CM

## 2017-12-19 DIAGNOSIS — K21.00 GASTROESOPHAGEAL REFLUX DISEASE WITH ESOPHAGITIS: ICD-10-CM

## 2017-12-19 PROCEDURE — 99213 OFFICE O/P EST LOW 20 MIN: CPT | Performed by: PHYSICIAN ASSISTANT

## 2017-12-19 RX ORDER — DEXTROSE AND SODIUM CHLORIDE 5; .45 G/100ML; G/100ML
30 INJECTION, SOLUTION INTRAVENOUS CONTINUOUS PRN
Status: CANCELLED | OUTPATIENT
Start: 2018-02-16

## 2017-12-19 RX ORDER — SODIUM, POTASSIUM,MAG SULFATES 17.5-3.13G
1 SOLUTION, RECONSTITUTED, ORAL ORAL ONCE
Qty: 1 BOTTLE | Refills: 0 | Status: SHIPPED | OUTPATIENT
Start: 2017-12-19 | End: 2017-12-19

## 2017-12-19 NOTE — PROGRESS NOTES
Chief Complaint   Patient presents with   • Anemia   • Heartburn       ENDO PROCEDURE ORDERED: COLON, hx polyp, Fe def anemia    Subjective    Orlin Mendes is a 52 y.o. male. he is here today for follow-up.    History of Present Illness    Patient seen on a recheck of his GERD, anemia.  Last seen 11/7/17.  Patient again accompanied by one of his caretakers.  He denies abdominal pain.  He is taking Nexium for his GERD.  No nausea, vomiting, dysphagia.  He is taking iron twice a day.  He has Imodium if needed.  Bowel movements are reported normal.  Weight is up 6 pounds since last visit.  Last EGD showed esophagitis on 10/27/17.  His last colonoscopy showed multiple polyps in 2014, he is due for repeat colonoscopy.    Laboratory on 12/8/17 showed a ferritin 26.7, iron 31, 8% saturation.  Hemoglobin 13.1, hematocrit 38.4, this is improving.    A/P: Iron deficiency anemia improving with iron supplementation.  GERD appears well-controlled on the Nexium.  Patient is having no difficulty eating, caretaker states he is eating very well.  Did recommend colonoscopy given his history of colon polyps and anemia.  We'll plan follow-up H&H, iron studies prior to a follow-up.  He will continue on iron supplementation for now.  Further pending clinical course and the results of the above.     The following portions of the patient's history were reviewed and updated as appropriate:   Past Medical History:   Diagnosis Date   • Abdominal pain 09/23/2013   • Autistic disorder 06/18/2015   • Benign prostatic hyperplasia 06/18/2015    symptomatic for   • Disruptive behavior disorder 07/20/2015    improved with medication   • Dysphagia 08/11/2014   • GERD (gastroesophageal reflux disease) 08/11/2014   • Moderate mental retardation (I.Q. 35-49) 01/21/2016     Past Surgical History:   Procedure Laterality Date   • COLONOSCOPY     • ENDOSCOPY N/A 10/27/2017    Procedure: ESOPHAGOGASTRODUODENOSCOPY;  Surgeon: Jus Aguilar MD;   Location[facility] ENDOSCOPY;  Service:    • UPPER GASTROINTESTINAL ENDOSCOPY     • UPPER GASTROINTESTINAL ENDOSCOPY  10/27/2017     Family History   Problem Relation Age of Onset   • Heart disease Mother    • Diabetes Mother    • Heart disease Father    • Heart disease Sister    • No Known Problems Brother    • No Known Problems Maternal Grandmother    • No Known Problems Maternal Grandfather    • No Known Problems Paternal Grandmother    • No Known Problems Paternal Grandfather    • No Known Problems Sister    • No Known Problems Sister        Allergies   Allergen Reactions   • Zithromax [Azithromycin]      Social History     Social History   • Marital status: Single     Spouse name: N/A   • Number of children: N/A   • Years of education: N/A     Social History Main Topics   • Smoking status: Never Smoker   • Smokeless tobacco: Never Used   • Alcohol use No   • Drug use: No   • Sexual activity: Not Asked     Other Topics Concern   • None     Social History Narrative       Current Outpatient Prescriptions:   •  acetaminophen (TYLENOL) 325 MG tablet, Take 2 tablets by mouth Every 4 (Four) Hours As Needed for Mild Pain (1-3)., Disp: 30 tablet, Rfl: 12  •  busPIRone (BUSPAR) 15 MG tablet, Take 15 mg by mouth 3 (Three) Times a Day., Disp: , Rfl:   •  chlorhexidine (PERIDEX) 0.12 % solution, Apply 15 mL to the mouth or throat 2 (Two) Times a Day., Disp: , Rfl:   •  citalopram (CeleXA) 20 MG tablet, Take 20 mg by mouth Daily., Disp: , Rfl:   •  Dextromethorphan-Guaifenesin 5-100 MG/5ML liquid, Take 1 teaspoon(s) by mouth 4 (Four) Times a Day As Needed (for cough)., Disp: 118 mL, Rfl: 12  •  esomeprazole (nexIUM) 40 MG capsule, Take 1 capsule by mouth Daily., Disp: 30 capsule, Rfl: 12  •  ferrous sulfate 325 (65 FE) MG tablet, Take 1 tablet by mouth 2 (Two) Times a Day With Meals., Disp: 60 tablet, Rfl: 2  •  hydrocortisone 1 % ointment, Apply  topically 3 (Three) Times a Day. Apply to rash or scrapes, Disp: 56 g, Rfl:  "12  •  loperamide (IMODIUM) 2 MG capsule, Take 1 capsule by mouth 4 (Four) Times a Day As Needed for Diarrhea. Take 1 cap after each loose stool not to exceed 8 per day prn, Disp: 30 capsule, Rfl: 12  •  magnesium hydroxide (MILK OF MAGNESIA) 400 MG/5ML suspension, Take  by mouth. 2 tablespoons per day prn, Disp: , Rfl:   •  neomycin-polymyxin-pramoxine (ANTIBIOTIC PLUS PAIN RELIEF) 1 % cream, Apply  topically 4 (Four) Times a Day As Needed (prn rash or scraps)., Disp: 28 g, Rfl: 12  •  QUEtiapine (SEROquel) 100 MG tablet, Take 100 mg by mouth Every Night., Disp: , Rfl:   •  tamsulosin (FLOMAX) 0.4 MG capsule 24 hr capsule, Take 1 capsule by mouth Daily., Disp: 30 capsule, Rfl: 12  •  thioridazine (MELLARIL) 25 MG tablet, Take 1 tablet by mouth 3 (Three) Times a Day. 1 tab in AM, 1 tab at noon and 2 tabs in PM, Disp: 90 tablet, Rfl: 12  Review of Systems  Review of Systems       Objective    /82 (BP Location: Left arm)  Pulse 75  Ht 180.3 cm (70.98\")  Wt 111 kg (245 lb)  BMI 34.19 kg/m2  Physical Exam   Constitutional: He is oriented to person, place, and time. He appears well-developed and well-nourished. No distress.   HENT:   Head: Normocephalic and atraumatic.   Eyes: EOM are normal. Pupils are equal, round, and reactive to light.   Neck: Normal range of motion.   Cardiovascular: Normal rate, regular rhythm and normal heart sounds.    Pulmonary/Chest: Effort normal and breath sounds normal.   Abdominal: Soft. Bowel sounds are normal. He exhibits no shifting dullness, no distension, no abdominal bruit, no ascites and no mass. There is no hepatosplenomegaly. There is no tenderness. There is no rigidity, no rebound, no guarding and no CVA tenderness. No hernia. Hernia confirmed negative in the ventral area.   Musculoskeletal: Normal range of motion.   Neurological: He is alert and oriented to person, place, and time.   Skin: Skin is warm and dry.   Psychiatric: He has a normal mood and affect. His " behavior is normal. Judgment and thought content normal.   Nursing note and vitals reviewed.    Assessment/Plan      1. Iron deficiency anemia due to chronic blood loss    2. Gastroesophageal reflux disease with esophagitis    3. History of colon polyps    .   Orlin was seen today for anemia and heartburn.    Diagnoses and all orders for this visit:    Iron deficiency anemia due to chronic blood loss  -     Iron and TIBC; Future  -     Ferritin; Future  -     Hemoglobin & Hematocrit, Blood; Future  -     Case Request; Standing  -     dextrose 5 % and sodium chloride 0.45 % infusion; Infuse 30 mL/hr into a venous catheter Continuous As Needed (Start Prior to Procedure).  -     Case Request    Gastroesophageal reflux disease with esophagitis  -     Iron and TIBC; Future  -     Ferritin; Future  -     Hemoglobin & Hematocrit, Blood; Future  -     Case Request; Standing  -     dextrose 5 % and sodium chloride 0.45 % infusion; Infuse 30 mL/hr into a venous catheter Continuous As Needed (Start Prior to Procedure).  -     Case Request    History of colon polyps  -     Iron and TIBC; Future  -     Ferritin; Future  -     Hemoglobin & Hematocrit, Blood; Future  -     Case Request; Standing  -     dextrose 5 % and sodium chloride 0.45 % infusion; Infuse 30 mL/hr into a venous catheter Continuous As Needed (Start Prior to Procedure).  -     Case Request    Other orders  -     Obtain Informed Consent; Standing  -     POC Glucose Once; Standing  -     sodium-potassium-magnesium sulfates (SUPREP BOWEL PREP KIT) 17.5-3.13-1.6 GM/180ML solution oral solution; Take 1 bottle by mouth 1 (One) Time for 1 dose. Take as per instruction sheet for colonoscopy prep.        Orders placed during this encounter include:  Orders Placed This Encounter   Procedures   • Iron and TIBC     Due before follow up     Standing Status:   Future     Standing Expiration Date:   2/23/2018   • Ferritin     Due before follow up     Standing Status:   Future      Standing Expiration Date:   2/23/2018   • Hemoglobin & Hematocrit, Blood     Due before follow up     Standing Status:   Future     Standing Expiration Date:   2/23/2018       Medications prescribed:  New Medications Ordered This Visit   Medications   • sodium-potassium-magnesium sulfates (SUPREP BOWEL PREP KIT) 17.5-3.13-1.6 GM/180ML solution oral solution     Sig: Take 1 bottle by mouth 1 (One) Time for 1 dose. Take as per instruction sheet for colonoscopy prep.     Dispense:  1 bottle     Refill:  0       Requested Prescriptions     Signed Prescriptions Disp Refills   • sodium-potassium-magnesium sulfates (SUPREP BOWEL PREP KIT) 17.5-3.13-1.6 GM/180ML solution oral solution 1 bottle 0     Sig: Take 1 bottle by mouth 1 (One) Time for 1 dose. Take as per instruction sheet for colonoscopy prep.       Review and/or summary of lab tests, radiology, procedures, medications. Review and summary of old records and obtaining of history. The risks and benefits of my recommendations, as well as other treatment options were discussed with the patient today. Questions were answered.    Follow-up: Return in about 2 months (around 2/19/2018), or if symptoms worsen or fail to improve, for lab prior.     COLONOSCOPY (N/A)      This document has been electronically signed by Bhargav Foreman PA-C on December 27, 2017 2:32 PM      Results for orders placed or performed in visit on 11/07/17   Iron and TIBC   Result Value Ref Range    Iron 31 (L) 49 - 181 mcg/dL    TIBC 375 261 - 462 mcg/dL    Iron Saturation 8 (L) 20 - 55 %   Hemoglobin & Hematocrit, Blood   Result Value Ref Range    Hemoglobin 13.1 (L) 13.7 - 17.3 g/dL    Hematocrit 38.4 (L) 39.0 - 49.0 %   Ferritin   Result Value Ref Range    Ferritin 26.70 17.90 - 464.00 ng/mL   Results for orders placed or performed during the hospital encounter of 10/27/17   Tissue Pathology Exam - Tissue, Gastric, Antrum   Result Value Ref Range    Case Report       Surgical Pathology  "Report                         Case: [id number]                                  Authorizing Provider:  Jus Aguilar MD         Collected:           10/27/2017 04:52 PM          Ordering Location:     [facility]             Received:            10/30/2017 07:48 AM                                 [facility]                                                     Pathologist:           Liam Evangelista MD                                                          Specimens:   1) - Gastric, Antrum, antrum bx                                                                     2) - Esophagus, Distal, distal esophagus bx                                                Final Diagnosis       1.  MUCOSA, ANTRUM OF STOMACH:  REACTIVE GASTROPATHY.    2.  MUCOSA, DISTAL ESOPHAGUS:  REACTIVE CHANGE OF SQUAMOUS MUCOSA AND CARDIAC GASTRIC MUCOSA.  NEGATIVE FOR DYSPLASIA AND GOBLET CELL METAPLASIA (ALCIAN BLUE/PAS STAIN).      Gross Description       1.  The first container is labeled \"antrum of stomach\" and has nodular bits of white soft tissue measuring 0.4 cc in aggregate.  The entire specimen is embedded as 1A.     2.  The second container is labeled \"distal esophagus\" and has a nodular fragment of white soft tissue measuring 0.2 cm in greatest dimension.  The entire specimen is embedded as 2A.       Embedded Images     Results for orders placed or performed in visit on 10/25/17   Iron and TIBC   Result Value Ref Range    Iron 47 (L) 49 - 181 mcg/dL    TIBC 333 261 - 462 mcg/dL    Iron Saturation 14 (L) 20 - 55 %   Hemoglobin & Hematocrit, Blood   Result Value Ref Range    Hemoglobin 10.5 (L) 13.7 - 17.3 g/dL    Hematocrit 30.3 (L) 39.0 - 49.0 %   Ferritin   Result Value Ref Range    Ferritin 32.70 17.90 - 464.00 ng/mL   Results for orders placed or performed in visit on 10/20/17   CBC Auto Differential   Result Value Ref Range    WBC 6.04 3.20 - 9.80 10*3/mm3    RBC 3.64 (L) 4.37 - 5.74 10*6/mm3    Hemoglobin " 10.8 (L) 13.7 - 17.3 g/dL    Hematocrit 32.3 (L) 39.0 - 49.0 %    MCV 88.7 80.0 - 98.0 fL    MCH 29.7 26.5 - 34.0 pg    MCHC 33.4 31.5 - 36.3 g/dL    RDW 13.3 11.5 - 14.5 %    RDW-SD 42.6 35.1 - 43.9 fl    MPV 10.5 8.0 - 12.0 fL    Platelets 245 150 - 450 10*3/mm3    Neutrophil % 52.8 37.0 - 80.0 %    Lymphocyte % 32.8 10.0 - 50.0 %    Monocyte % 10.3 0.0 - 12.0 %    Eosinophil % 2.3 0.0 - 7.0 %    Basophil % 0.3 0.0 - 2.0 %    Immature Grans % 1.5 (H) 0.0 - 0.5 %    Neutrophils, Absolute 3.19 2.00 - 8.60 10*3/mm3    Lymphocytes, Absolute 1.98 0.60 - 4.20 10*3/mm3    Monocytes, Absolute 0.62 0.00 - 0.90 10*3/mm3    Eosinophils, Absolute 0.14 0.00 - 0.70 10*3/mm3    Basophils, Absolute 0.02 0.00 - 0.20 10*3/mm3    Immature Grans, Absolute 0.09 (H) 0.00 - 0.02 10*3/mm3   Comprehensive Metabolic Panel   Result Value Ref Range    Glucose 101 (H) 60 - 100 mg/dL    BUN 7 7 - 21 mg/dL    Creatinine 0.90 0.70 - 1.30 mg/dL    Sodium 139 137 - 145 mmol/L    Potassium 3.5 3.5 - 5.1 mmol/L    Chloride 100 95 - 110 mmol/L    CO2 28.0 22.0 - 31.0 mmol/L    Calcium 8.5 8.4 - 10.2 mg/dL    Total Protein 5.9 (L) 6.3 - 8.6 g/dL    Albumin 3.40 3.40 - 4.80 g/dL    ALT (SGPT) 47 21 - 72 U/L    AST (SGOT) 17 17 - 59 U/L    Alkaline Phosphatase 48 38 - 126 U/L    Total Bilirubin 0.2 0.2 - 1.3 mg/dL    eGFR Non  Amer 89 56 - 130 mL/min/1.73    Globulin 2.5 2.3 - 3.5 gm/dL    A/G Ratio 1.4 1.1 - 1.8 g/dL    BUN/Creatinine Ratio 7.8 7.0 - 25.0    Anion Gap 11.0 5.0 - 15.0 mmol/L   Results for orders placed or performed in visit on 05/25/17   PSA   Result Value Ref Range    PSA 0.293 0.000 - 4.000 ng/mL   Comprehensive metabolic panel   Result Value Ref Range    Glucose 90 60 - 100 mg/dL    BUN 11 7 - 21 mg/dL    Creatinine 0.95 0.70 - 1.30 mg/dL    Sodium 142 137 - 145 mmol/L    Potassium 3.9 3.5 - 5.1 mmol/L    Chloride 100 95 - 110 mmol/L    CO2 30.0 22.0 - 31.0 mmol/L    Calcium 9.2 8.4 - 10.2 mg/dL    Total Protein 6.8 6.3 - 8.6  g/dL    Albumin 3.90 3.40 - 4.80 g/dL    ALT (SGPT) 39 21 - 72 U/L    AST (SGOT) 22 17 - 59 U/L    Alkaline Phosphatase 52 38 - 126 U/L    Total Bilirubin 0.5 0.2 - 1.3 mg/dL    eGFR Non  Amer 84 56 - 130 mL/min/1.73    Globulin 2.9 2.3 - 3.5 gm/dL    A/G Ratio 1.3 1.1 - 1.8 g/dL    BUN/Creatinine Ratio 11.6 7.0 - 25.0    Anion Gap 12.0 5.0 - 15.0 mmol/L   Results for orders placed or performed in visit on 05/25/17   CBC Auto Differential   Result Value Ref Range    WBC 4.79 3.20 - 9.80 10*3/mm3    RBC 4.35 (L) 4.37 - 5.74 10*6/mm3    Hemoglobin 13.1 (L) 13.7 - 17.3 g/dL    Hematocrit 38.0 (L) 39.0 - 49.0 %    MCV 87.4 80.0 - 98.0 fL    MCH 30.1 26.5 - 34.0 pg    MCHC 34.5 31.5 - 36.3 g/dL    RDW 12.2 11.5 - 14.5 %    RDW-SD 39.4 35.1 - 43.9 fl    MPV 11.6 8.0 - 12.0 fL    Platelets 147 (L) 150 - 450 10*3/mm3    Neutrophil % 49.2 37.0 - 80.0 %    Lymphocyte % 35.1 10.0 - 50.0 %    Monocyte % 6.7 0.0 - 12.0 %    Eosinophil % 8.6 (H) 0.0 - 7.0 %    Basophil % 0.4 0.0 - 2.0 %    Immature Grans % 0.0 0.0 - 0.5 %    Neutrophils, Absolute 2.36 2.00 - 8.60 10*3/mm3    Lymphocytes, Absolute 1.68 0.60 - 4.20 10*3/mm3    Monocytes, Absolute 0.32 0.00 - 0.90 10*3/mm3    Eosinophils, Absolute 0.41 0.00 - 0.70 10*3/mm3    Basophils, Absolute 0.02 0.00 - 0.20 10*3/mm3    Immature Grans, Absolute 0.00 0.00 - 0.02 10*3/mm3     *Note: Due to a large number of results and/or encounters for the requested time period, some results have not been displayed. A complete set of results can be found in Results Review.       Some portions of this note have been dictated using voice recognition software and may contain errors and/or omissions.

## 2017-12-29 ENCOUNTER — OFFICE VISIT (OUTPATIENT)
Dept: FAMILY MEDICINE CLINIC | Facility: CLINIC | Age: 52
End: 2017-12-29

## 2017-12-29 VITALS
DIASTOLIC BLOOD PRESSURE: 83 MMHG | BODY MASS INDEX: 34.72 KG/M2 | HEIGHT: 71 IN | TEMPERATURE: 98.3 F | WEIGHT: 248 LBS | HEART RATE: 86 BPM | SYSTOLIC BLOOD PRESSURE: 120 MMHG | RESPIRATION RATE: 24 BRPM | OXYGEN SATURATION: 98 %

## 2017-12-29 DIAGNOSIS — J32.9 SINUSITIS, UNSPECIFIED CHRONICITY, UNSPECIFIED LOCATION: Primary | ICD-10-CM

## 2017-12-29 DIAGNOSIS — R50.9 FEVER, UNSPECIFIED FEVER CAUSE: ICD-10-CM

## 2017-12-29 DIAGNOSIS — R09.81 NASAL CONGESTION: ICD-10-CM

## 2017-12-29 LAB
EXPIRATION DATE: NORMAL
FLUAV AG NPH QL: NORMAL
FLUBV AG NPH QL: NORMAL
INTERNAL CONTROL: NORMAL
Lab: NORMAL

## 2017-12-29 PROCEDURE — 87804 INFLUENZA ASSAY W/OPTIC: CPT | Performed by: NURSE PRACTITIONER

## 2017-12-29 PROCEDURE — 99214 OFFICE O/P EST MOD 30 MIN: CPT | Performed by: NURSE PRACTITIONER

## 2017-12-29 RX ORDER — GUAIFENESIN 600 MG/1
1200 TABLET, EXTENDED RELEASE ORAL EVERY 12 HOURS SCHEDULED
Qty: 20 TABLET | Refills: 0 | Status: SHIPPED | OUTPATIENT
Start: 2017-12-29 | End: 2017-12-29 | Stop reason: SDUPTHER

## 2017-12-29 RX ORDER — CEFUROXIME AXETIL 500 MG/1
500 TABLET ORAL 2 TIMES DAILY
Qty: 20 TABLET | Refills: 0 | Status: ON HOLD | OUTPATIENT
Start: 2017-12-29 | End: 2018-02-16

## 2017-12-29 RX ORDER — CEFUROXIME AXETIL 500 MG/1
500 TABLET ORAL 2 TIMES DAILY
Qty: 20 TABLET | Refills: 0 | Status: SHIPPED | OUTPATIENT
Start: 2017-12-29 | End: 2017-12-29 | Stop reason: SDUPTHER

## 2017-12-29 RX ORDER — GUAIFENESIN 600 MG/1
1200 TABLET, EXTENDED RELEASE ORAL EVERY 12 HOURS SCHEDULED
Qty: 20 TABLET | Refills: 0 | Status: SHIPPED | OUTPATIENT
Start: 2017-12-29 | End: 2018-04-10

## 2017-12-29 NOTE — PROGRESS NOTES
Subjective   Orlin Mendes is a 52 y.o. male.     HPI Comments: Here today with cough, congestion and low grade fever for the past 5 days.  Has taken robitussin and tylenol for sx.  Nasal drainage has been discolored.    Fever    This is a new problem. The current episode started in the past 7 days. The problem occurs rarely. The problem has been waxing and waning. The maximum temperature noted was 100 to 100.9 F. The temperature was taken using an oral thermometer. Associated symptoms include congestion, coughing and ear pain. Pertinent negatives include no abdominal pain, chest pain, diarrhea, headaches, muscle aches, nausea, rash, sleepiness, sore throat, urinary pain, vomiting or wheezing. He has tried acetaminophen for the symptoms. The treatment provided moderate relief.   Risk factors: immunosuppression    Risk factors: no contaminated food, no contaminated water, no hx of cancer, no occupational exposure, no recent sickness, no recent travel and no sick contacts    Cough   This is a new problem. The current episode started in the past 7 days. The problem has been waxing and waning. The problem occurs every few minutes. The cough is non-productive. Associated symptoms include ear congestion, ear pain, a fever, nasal congestion and rhinorrhea. Pertinent negatives include no chest pain, headaches, heartburn, hemoptysis, myalgias, rash, sore throat, shortness of breath, sweats, weight loss or wheezing. Nothing aggravates the symptoms. He has tried OTC cough suppressant for the symptoms. The treatment provided no relief. There is no history of asthma, bronchiectasis, bronchitis, COPD, emphysema, environmental allergies or pneumonia.        The following portions of the patient's history were reviewed and updated as appropriate: allergies, current medications, past family history, past medical history, past social history, past surgical history and problem list.    Review of Systems   Constitutional: Positive  for fever. Negative for weight loss.   HENT: Positive for congestion, ear pain and rhinorrhea. Negative for sore throat.    Respiratory: Positive for cough. Negative for hemoptysis, shortness of breath and wheezing.    Cardiovascular: Negative.  Negative for chest pain.   Gastrointestinal: Negative for abdominal pain, diarrhea, heartburn, nausea and vomiting.   Genitourinary: Negative for dysuria.   Musculoskeletal: Negative.  Negative for myalgias.   Skin: Negative.  Negative for rash.   Allergic/Immunologic: Negative for environmental allergies.   Neurological: Negative.  Negative for headaches.   Psychiatric/Behavioral: Negative.        Objective   Physical Exam   Constitutional: He is oriented to person, place, and time. He appears well-developed and well-nourished. No distress.   HENT:   Head: Normocephalic.   Right Ear: Hearing, tympanic membrane, external ear and ear canal normal. Tympanic membrane is not injected.   Left Ear: Hearing, external ear and ear canal normal. Tympanic membrane is injected.   Nose: Rhinorrhea (discolored nasal drainage is noted.) present.   Mouth/Throat: Oropharynx is clear and moist.   Eyes: Pupils are equal, round, and reactive to light.   Neck: Normal range of motion. Neck supple. No thyromegaly present.   Cardiovascular: Normal rate, regular rhythm and normal heart sounds.  Exam reveals no friction rub.    No murmur heard.  Pulmonary/Chest: Effort normal and breath sounds normal. No respiratory distress. He has no wheezes. He has no rales.   Abdominal: Soft.   Musculoskeletal: Normal range of motion.   Neurological: He is alert and oriented to person, place, and time.   Skin: Skin is warm and dry.   Psychiatric: He has a normal mood and affect. Thought content normal.   Nursing note and vitals reviewed.      Assessment/Plan   Orlin was seen today for nasal congestion and fever.    Diagnoses and all orders for this visit:    Sinusitis, unspecified chronicity, unspecified  location  -     Discontinue: cefuroxime (CEFTIN) 500 MG tablet; Take 1 tablet by mouth 2 (Two) Times a Day.  -     Discontinue: guaiFENesin (MUCINEX) 600 MG 12 hr tablet; Take 2 tablets by mouth Every 12 (Twelve) Hours.  -     guaiFENesin (MUCINEX) 600 MG 12 hr tablet; Take 2 tablets by mouth Every 12 (Twelve) Hours.  -     cefuroxime (CEFTIN) 500 MG tablet; Take 1 tablet by mouth 2 (Two) Times a Day.    Nasal congestion  -     POC Influenza A / B    Fever, unspecified fever cause  -     POC Influenza A / B

## 2018-02-05 RX ORDER — FERROUS SULFATE 325(65) MG
325 TABLET ORAL 2 TIMES DAILY WITH MEALS
Qty: 60 TABLET | Refills: 0 | Status: SHIPPED | OUTPATIENT
Start: 2018-02-05 | End: 2018-02-27 | Stop reason: SDUPTHER

## 2018-02-16 ENCOUNTER — ANESTHESIA EVENT (OUTPATIENT)
Dept: GASTROENTEROLOGY | Facility: HOSPITAL | Age: 53
End: 2018-02-16

## 2018-02-16 ENCOUNTER — HOSPITAL ENCOUNTER (OUTPATIENT)
Facility: HOSPITAL | Age: 53
Setting detail: HOSPITAL OUTPATIENT SURGERY
Discharge: HOME | End: 2018-02-16
Attending: INTERNAL MEDICINE | Admitting: INTERNAL MEDICINE

## 2018-02-16 ENCOUNTER — ANESTHESIA (OUTPATIENT)
Dept: GASTROENTEROLOGY | Facility: HOSPITAL | Age: 53
End: 2018-02-16

## 2018-02-16 VITALS
BODY MASS INDEX: 34.21 KG/M2 | TEMPERATURE: 97.9 F | DIASTOLIC BLOOD PRESSURE: 69 MMHG | OXYGEN SATURATION: 96 % | HEART RATE: 57 BPM | WEIGHT: 244.38 LBS | RESPIRATION RATE: 18 BRPM | SYSTOLIC BLOOD PRESSURE: 112 MMHG | HEIGHT: 71 IN

## 2018-02-16 DIAGNOSIS — K21.00 GASTROESOPHAGEAL REFLUX DISEASE WITH ESOPHAGITIS: ICD-10-CM

## 2018-02-16 DIAGNOSIS — D50.0 IRON DEFICIENCY ANEMIA DUE TO CHRONIC BLOOD LOSS: ICD-10-CM

## 2018-02-16 DIAGNOSIS — Z86.010 HISTORY OF COLON POLYPS: ICD-10-CM

## 2018-02-16 PROCEDURE — G0105 COLORECTAL SCRN; HI RISK IND: HCPCS | Performed by: INTERNAL MEDICINE

## 2018-02-16 PROCEDURE — 25010000002 PROPOFOL 10 MG/ML EMULSION: Performed by: NURSE ANESTHETIST, CERTIFIED REGISTERED

## 2018-02-16 RX ORDER — PROPOFOL 10 MG/ML
VIAL (ML) INTRAVENOUS AS NEEDED
Status: DISCONTINUED | OUTPATIENT
Start: 2018-02-16 | End: 2018-02-16 | Stop reason: SURG

## 2018-02-16 RX ORDER — DEXTROSE AND SODIUM CHLORIDE 5; .45 G/100ML; G/100ML
20 INJECTION, SOLUTION INTRAVENOUS CONTINUOUS
Status: CANCELLED | OUTPATIENT
Start: 2018-02-16

## 2018-02-16 RX ORDER — DEXTROSE AND SODIUM CHLORIDE 5; .45 G/100ML; G/100ML
30 INJECTION, SOLUTION INTRAVENOUS CONTINUOUS PRN
Status: DISCONTINUED | OUTPATIENT
Start: 2018-02-16 | End: 2018-02-16 | Stop reason: HOSPADM

## 2018-02-16 RX ADMIN — PROPOFOL 20 MG: 10 INJECTION, EMULSION INTRAVENOUS at 10:32

## 2018-02-16 RX ADMIN — PROPOFOL 20 MG: 10 INJECTION, EMULSION INTRAVENOUS at 10:28

## 2018-02-16 RX ADMIN — DEXTROSE AND SODIUM CHLORIDE 30 ML/HR: 5; 450 INJECTION, SOLUTION INTRAVENOUS at 10:08

## 2018-02-16 RX ADMIN — PROPOFOL 100 MG: 10 INJECTION, EMULSION INTRAVENOUS at 10:26

## 2018-02-16 RX ADMIN — PROPOFOL 20 MG: 10 INJECTION, EMULSION INTRAVENOUS at 10:30

## 2018-02-16 NOTE — H&P
Progress Notes  Encounter Date: 2/16/2018  Jus cotto  Gastroenterology   Expand All Collapse All    []Hide copied text      Chief Complaint   Patient presents with   • Anemia   • Heartburn         ENDO PROCEDURE ORDERED: COLON, hx polyp, Fe def anemia        Subjective        Orlin Mendes is a 52 y.o. male. he is here today for follow-up.     History of Present Illness     Patient seen on a recheck of his GERD, anemia.  Last seen 11/7/17.  Patient again accompanied by one of his caretakers.  He denies abdominal pain.  He is taking Nexium for his GERD.  No nausea, vomiting, dysphagia.  He is taking iron twice a day.  He has Imodium if needed.  Bowel movements are reported normal.  Weight is up 6 pounds since last visit.  Last EGD showed esophagitis on 10/27/17.  His last colonoscopy showed multiple polyps in 2014, he is due for repeat colonoscopy.     Laboratory on 12/8/17 showed a ferritin 26.7, iron 31, 8% saturation.  Hemoglobin 13.1, hematocrit 38.4, this is improving.     A/P: Iron deficiency anemia improving with iron supplementation.  GERD appears well-controlled on the Nexium.  Patient is having no difficulty eating, caretaker states he is eating very well.  Did recommend colonoscopy given his history of colon polyps and anemia.  We'll plan follow-up H&H, iron studies prior to a follow-up.  He will continue on iron supplementation for now.  Further pending clinical course and the results of the above.      The following portions of the patient's history were reviewed and updated as appropriate:    Medical History         Past Medical History:   Diagnosis Date   • Abdominal pain 09/23/2013   • Autistic disorder 06/18/2015   • Benign prostatic hyperplasia 06/18/2015     symptomatic for   • Disruptive behavior disorder 07/20/2015     improved with medication   • Dysphagia 08/11/2014   • GERD (gastroesophageal reflux disease) 08/11/2014   • Moderate mental retardation (I.Q. 35-49) 01/21/2016           Surgical History          Past Surgical History:   Procedure Laterality Date   • COLONOSCOPY       • ENDOSCOPY N/A 10/27/2017     Procedure: ESOPHAGOGASTRODUODENOSCOPY;  Surgeon: Jus Aguilar MD;  Location: [facility] ENDOSCOPY;  Service:    • UPPER GASTROINTESTINAL ENDOSCOPY       • UPPER GASTROINTESTINAL ENDOSCOPY   10/27/2017               Family History   Problem Relation Age of Onset   • Heart disease Mother     • Diabetes Mother     • Heart disease Father     • Heart disease Sister     • No Known Problems Brother     • No Known Problems Maternal Grandmother     • No Known Problems Maternal Grandfather     • No Known Problems Paternal Grandmother     • No Known Problems Paternal Grandfather     • No Known Problems Sister     • No Known Problems Sister        Allergies   Allergen Reactions   • Zithromax [Azithromycin]         Social History    Social History            Social History   • Marital status: Single       Spouse name: N/A   • Number of children: N/A   • Years of education: N/A           Social History Main Topics   • Smoking status: Never Smoker   • Smokeless tobacco: Never Used   • Alcohol use No   • Drug use: No   • Sexual activity: Not Asked           Other Topics Concern   • None      Social History Narrative            Current Outpatient Prescriptions:   •  acetaminophen (TYLENOL) 325 MG tablet, Take 2 tablets by mouth Every 4 (Four) Hours As Needed for Mild Pain (1-3)., Disp: 30 tablet, Rfl: 12  •  busPIRone (BUSPAR) 15 MG tablet, Take 15 mg by mouth 3 (Three) Times a Day., Disp: , Rfl:   •  chlorhexidine (PERIDEX) 0.12 % solution, Apply 15 mL to the mouth or throat 2 (Two) Times a Day., Disp: , Rfl:   •  citalopram (CeleXA) 20 MG tablet, Take 20 mg by mouth Daily., Disp: , Rfl:   •  Dextromethorphan-Guaifenesin 5-100 MG/5ML liquid, Take 1 teaspoon(s) by mouth 4 (Four) Times a Day As Needed (for cough)., Disp: 118 mL, Rfl: 12  •  esomeprazole (nexIUM) 40 MG capsule, Take 1 capsule by mouth  "Daily., Disp: 30 capsule, Rfl: 12  •  ferrous sulfate 325 (65 FE) MG tablet, Take 1 tablet by mouth 2 (Two) Times a Day With Meals., Disp: 60 tablet, Rfl: 2  •  hydrocortisone 1 % ointment, Apply  topically 3 (Three) Times a Day. Apply to rash or scrapes, Disp: 56 g, Rfl: 12  •  loperamide (IMODIUM) 2 MG capsule, Take 1 capsule by mouth 4 (Four) Times a Day As Needed for Diarrhea. Take 1 cap after each loose stool not to exceed 8 per day prn, Disp: 30 capsule, Rfl: 12  •  magnesium hydroxide (MILK OF MAGNESIA) 400 MG/5ML suspension, Take  by mouth. 2 tablespoons per day prn, Disp: , Rfl:   •  neomycin-polymyxin-pramoxine (ANTIBIOTIC PLUS PAIN RELIEF) 1 % cream, Apply  topically 4 (Four) Times a Day As Needed (prn rash or scraps)., Disp: 28 g, Rfl: 12  •  QUEtiapine (SEROquel) 100 MG tablet, Take 100 mg by mouth Every Night., Disp: , Rfl:   •  tamsulosin (FLOMAX) 0.4 MG capsule 24 hr capsule, Take 1 capsule by mouth Daily., Disp: 30 capsule, Rfl: 12  •  thioridazine (MELLARIL) 25 MG tablet, Take 1 tablet by mouth 3 (Three) Times a Day. 1 tab in AM, 1 tab at noon and 2 tabs in PM, Disp: 90 tablet, Rfl: 12  Review of Systems  Review of Systems                        Objective        /82 (BP Location: Left arm)  Pulse 75  Ht 180.3 cm (70.98\")  Wt 111 kg (245 lb)  BMI 34.19 kg/m2  Physical Exam   Constitutional: He is oriented to person, place, and time. He appears well-developed and well-nourished. No distress.   HENT:   Head: Normocephalic and atraumatic.   Eyes: EOM are normal. Pupils are equal, round, and reactive to light.   Neck: Normal range of motion.   Cardiovascular: Normal rate, regular rhythm and normal heart sounds.    Pulmonary/Chest: Effort normal and breath sounds normal.   Abdominal: Soft. Bowel sounds are normal. He exhibits no shifting dullness, no distension, no abdominal bruit, no ascites and no mass. There is no hepatosplenomegaly. There is no tenderness. There is no rigidity, no rebound, " no guarding and no CVA tenderness. No hernia. Hernia confirmed negative in the ventral area.   Musculoskeletal: Normal range of motion.   Neurological: He is alert and oriented to person, place, and time.   Skin: Skin is warm and dry.   Psychiatric: He has a normal mood and affect. His behavior is normal. Judgment and thought content normal.   Nursing note and vitals reviewed.        Assessment/Plan           1. Iron deficiency anemia due to chronic blood loss    2. Gastroesophageal reflux disease with esophagitis    3. History of colon polyps    .   Orlin was seen today for anemia and heartburn.     Diagnoses and all orders for this visit:     Iron deficiency anemia due to chronic blood loss  -     Iron and TIBC; Future  -     Ferritin; Future  -     Hemoglobin & Hematocrit, Blood; Future  -     Case Request; Standing  -     dextrose 5 % and sodium chloride 0.45 % infusion; Infuse 30 mL/hr into a venous catheter Continuous As Needed (Start Prior to Procedure).  -     Case Request     Gastroesophageal reflux disease with esophagitis  -     Iron and TIBC; Future  -     Ferritin; Future  -     Hemoglobin & Hematocrit, Blood; Future  -     Case Request; Standing  -     dextrose 5 % and sodium chloride 0.45 % infusion; Infuse 30 mL/hr into a venous catheter Continuous As Needed (Start Prior to Procedure).  -     Case Request     History of colon polyps  -     Iron and TIBC; Future  -     Ferritin; Future  -     Hemoglobin & Hematocrit, Blood; Future  -     Case Request; Standing  -     dextrose 5 % and sodium chloride 0.45 % infusion; Infuse 30 mL/hr into a venous catheter Continuous As Needed (Start Prior to Procedure).  -     Case Request     Other orders  -     Obtain Informed Consent; Standing  -     POC Glucose Once; Standing  -     sodium-potassium-magnesium sulfates (SUPREP BOWEL PREP KIT) 17.5-3.13-1.6 GM/180ML solution oral solution; Take 1 bottle by mouth 1 (One) Time for 1 dose. Take as per instruction sheet  for colonoscopy prep.           Orders placed during this encounter include:        Orders Placed This Encounter   Procedures   • Iron and TIBC       Due before follow up       Standing Status:   Future       Standing Expiration Date:   2/23/2018   • Ferritin       Due before follow up       Standing Status:   Future       Standing Expiration Date:   2/23/2018   • Hemoglobin & Hematocrit, Blood       Due before follow up       Standing Status:   Future       Standing Expiration Date:   2/23/2018         Medications prescribed:       New Medications Ordered This Visit   Medications   • sodium-potassium-magnesium sulfates (SUPREP BOWEL PREP KIT) 17.5-3.13-1.6 GM/180ML solution oral solution       Sig: Take 1 bottle by mouth 1 (One) Time for 1 dose. Take as per instruction sheet for colonoscopy prep.       Dispense:  1 bottle       Refill:  0         Requested Prescriptions             Signed Prescriptions Disp Refills   • sodium-potassium-magnesium sulfates (SUPREP BOWEL PREP KIT) 17.5-3.13-1.6 GM/180ML solution oral solution 1 bottle 0       Sig: Take 1 bottle by mouth 1 (One) Time for 1 dose. Take as per instruction sheet for colonoscopy prep.         Review and/or summary of lab tests, radiology, procedures, medications. Review and summary of old records and obtaining of history. The risks and benefits of my recommendations, as well as other treatment options were discussed with the patient today. Questions were answered.     Follow-up: Return in about 2 months (around 2/19/2018), or if symptoms worsen or fail to improve, for lab prior.     COLONOSCOPY (N/A)              This document has been electronically signed by Jus Aguilar MD on February 16, 2018 9:19 AM               Results for orders placed or performed in visit on 11/07/17   Iron and TIBC   Result Value Ref Range     Iron 31 (L) 49 - 181 mcg/dL     TIBC 375 261 - 462 mcg/dL     Iron Saturation 8 (L) 20 - 55 %   Hemoglobin & Hematocrit, Blood  "  Result Value Ref Range     Hemoglobin 13.1 (L) 13.7 - 17.3 g/dL     Hematocrit 38.4 (L) 39.0 - 49.0 %   Ferritin   Result Value Ref Range     Ferritin 26.70 17.90 - 464.00 ng/mL   Results for orders placed or performed during the hospital encounter of 10/27/17   Tissue Pathology Exam - Tissue, Gastric, Antrum   Result Value Ref Range     Case Report           Surgical Pathology Report                         Case: [id number]                                  Authorizing Provider:  Jus Aguilar MD         Collected:           10/27/2017 04:52 PM          Ordering Location:     [facility]             Received:            10/30/2017 07:48 AM                                 [place] [facility]                                                     Pathologist:           Liam Evangelista MD                                                          Specimens:   1) - Gastric, Antrum, antrum bx                                                                     2) - Esophagus, Distal, distal esophagus bx                                               Final Diagnosis           1.  MUCOSA, ANTRUM OF STOMACH:  REACTIVE GASTROPATHY.     2.  MUCOSA, DISTAL ESOPHAGUS:  REACTIVE CHANGE OF SQUAMOUS MUCOSA AND CARDIAC GASTRIC MUCOSA.  NEGATIVE FOR DYSPLASIA AND GOBLET CELL METAPLASIA (ALCIAN BLUE/PAS STAIN).     Gross Description           1.  The first container is labeled \"antrum of stomach\" and has nodular bits of white soft tissue measuring 0.4 cc in aggregate.  The entire specimen is embedded as 1A.      2.  The second container is labeled \"distal esophagus\" and has a nodular fragment of white soft tissue measuring 0.2 cm in greatest dimension.  The entire specimen is embedded as 2A.      Embedded Images       Results for orders placed or performed in visit on 10/25/17   Iron and TIBC   Result Value Ref Range     Iron 47 (L) 49 - 181 mcg/dL     TIBC 333 261 - 462 mcg/dL     Iron Saturation 14 (L) 20 - 55 %   Hemoglobin " & Hematocrit, Blood   Result Value Ref Range     Hemoglobin 10.5 (L) 13.7 - 17.3 g/dL     Hematocrit 30.3 (L) 39.0 - 49.0 %   Ferritin   Result Value Ref Range     Ferritin 32.70 17.90 - 464.00 ng/mL   Results for orders placed or performed in visit on 10/20/17   CBC Auto Differential   Result Value Ref Range     WBC 6.04 3.20 - 9.80 10*3/mm3     RBC 3.64 (L) 4.37 - 5.74 10*6/mm3     Hemoglobin 10.8 (L) 13.7 - 17.3 g/dL     Hematocrit 32.3 (L) 39.0 - 49.0 %     MCV 88.7 80.0 - 98.0 fL     MCH 29.7 26.5 - 34.0 pg     MCHC 33.4 31.5 - 36.3 g/dL     RDW 13.3 11.5 - 14.5 %     RDW-SD 42.6 35.1 - 43.9 fl     MPV 10.5 8.0 - 12.0 fL     Platelets 245 150 - 450 10*3/mm3     Neutrophil % 52.8 37.0 - 80.0 %     Lymphocyte % 32.8 10.0 - 50.0 %     Monocyte % 10.3 0.0 - 12.0 %     Eosinophil % 2.3 0.0 - 7.0 %     Basophil % 0.3 0.0 - 2.0 %     Immature Grans % 1.5 (H) 0.0 - 0.5 %     Neutrophils, Absolute 3.19 2.00 - 8.60 10*3/mm3     Lymphocytes, Absolute 1.98 0.60 - 4.20 10*3/mm3     Monocytes, Absolute 0.62 0.00 - 0.90 10*3/mm3     Eosinophils, Absolute 0.14 0.00 - 0.70 10*3/mm3     Basophils, Absolute 0.02 0.00 - 0.20 10*3/mm3     Immature Grans, Absolute 0.09 (H) 0.00 - 0.02 10*3/mm3   Comprehensive Metabolic Panel   Result Value Ref Range     Glucose 101 (H) 60 - 100 mg/dL     BUN 7 7 - 21 mg/dL     Creatinine 0.90 0.70 - 1.30 mg/dL     Sodium 139 137 - 145 mmol/L     Potassium 3.5 3.5 - 5.1 mmol/L     Chloride 100 95 - 110 mmol/L     CO2 28.0 22.0 - 31.0 mmol/L     Calcium 8.5 8.4 - 10.2 mg/dL     Total Protein 5.9 (L) 6.3 - 8.6 g/dL     Albumin 3.40 3.40 - 4.80 g/dL     ALT (SGPT) 47 21 - 72 U/L     AST (SGOT) 17 17 - 59 U/L     Alkaline Phosphatase 48 38 - 126 U/L     Total Bilirubin 0.2 0.2 - 1.3 mg/dL     eGFR Non  Amer 89 56 - 130 mL/min/1.73     Globulin 2.5 2.3 - 3.5 gm/dL     A/G Ratio 1.4 1.1 - 1.8 g/dL     BUN/Creatinine Ratio 7.8 7.0 - 25.0     Anion Gap 11.0 5.0 - 15.0 mmol/L   Results for orders  placed or performed in visit on 05/25/17   PSA   Result Value Ref Range     PSA 0.293 0.000 - 4.000 ng/mL   Comprehensive metabolic panel   Result Value Ref Range     Glucose 90 60 - 100 mg/dL     BUN 11 7 - 21 mg/dL     Creatinine 0.95 0.70 - 1.30 mg/dL     Sodium 142 137 - 145 mmol/L     Potassium 3.9 3.5 - 5.1 mmol/L     Chloride 100 95 - 110 mmol/L     CO2 30.0 22.0 - 31.0 mmol/L     Calcium 9.2 8.4 - 10.2 mg/dL     Total Protein 6.8 6.3 - 8.6 g/dL     Albumin 3.90 3.40 - 4.80 g/dL     ALT (SGPT) 39 21 - 72 U/L     AST (SGOT) 22 17 - 59 U/L     Alkaline Phosphatase 52 38 - 126 U/L     Total Bilirubin 0.5 0.2 - 1.3 mg/dL     eGFR Non  Amer 84 56 - 130 mL/min/1.73     Globulin 2.9 2.3 - 3.5 gm/dL     A/G Ratio 1.3 1.1 - 1.8 g/dL     BUN/Creatinine Ratio 11.6 7.0 - 25.0     Anion Gap 12.0 5.0 - 15.0 mmol/L   Results for orders placed or performed in visit on 05/25/17   CBC Auto Differential   Result Value Ref Range     WBC 4.79 3.20 - 9.80 10*3/mm3     RBC 4.35 (L) 4.37 - 5.74 10*6/mm3     Hemoglobin 13.1 (L) 13.7 - 17.3 g/dL     Hematocrit 38.0 (L) 39.0 - 49.0 %     MCV 87.4 80.0 - 98.0 fL     MCH 30.1 26.5 - 34.0 pg     MCHC 34.5 31.5 - 36.3 g/dL     RDW 12.2 11.5 - 14.5 %     RDW-SD 39.4 35.1 - 43.9 fl     MPV 11.6 8.0 - 12.0 fL     Platelets 147 (L) 150 - 450 10*3/mm3     Neutrophil % 49.2 37.0 - 80.0 %     Lymphocyte % 35.1 10.0 - 50.0 %     Monocyte % 6.7 0.0 - 12.0 %     Eosinophil % 8.6 (H) 0.0 - 7.0 %     Basophil % 0.4 0.0 - 2.0 %     Immature Grans % 0.0 0.0 - 0.5 %     Neutrophils, Absolute 2.36 2.00 - 8.60 10*3/mm3     Lymphocytes, Absolute 1.68 0.60 - 4.20 10*3/mm3     Monocytes, Absolute 0.32 0.00 - 0.90 10*3/mm3     Eosinophils, Absolute 0.41 0.00 - 0.70 10*3/mm3     Basophils, Absolute 0.02 0.00 - 0.20 10*3/mm3     Immature Grans, Absolute 0.00 0.00 - 0.02 10*3/mm3      *Note: Due to a large number of results and/or encounters for the requested time period, some results have not been  displayed. A complete set of results can be found in Results Review.         Some portions of this note have been dictated using voice recognition software and may contain errors and/or omissions.            Office Visit on 12/19/2017              Detailed Report

## 2018-02-16 NOTE — ANESTHESIA POSTPROCEDURE EVALUATION
Patient: Orlin Mendes    Procedure Summary     Date Anesthesia Start Anesthesia Stop Room / Location    02/16/18 1022 1036 [facility] ENDOSCOPY 1 / [facility] ENDOSCOPY       Procedure Diagnosis Surgeon Provider    COLONOSCOPY (N/A ) Iron deficiency anemia due to chronic blood loss; History of colon polyps; Gastroesophageal reflux disease with esophagitis  (Iron deficiency anemia due to chronic blood loss [D50.0]; History of colon polyps [Z86.010]; Gastroesophageal reflux disease with esophagitis [K21.0]) MD Bobby Abebe CRNA          Anesthesia Type: MAC  Last vitals  BP   149/91 (02/16/18 1001)   Temp   96.6 °F (35.9 °C) (02/16/18 1001)   Pulse   67 (02/16/18 1001)   Resp   20 (02/16/18 1001)     SpO2   97 % (02/16/18 1001)     Post Anesthesia Care and Evaluation    Patient location during evaluation: bedside  Patient participation: complete - patient participated  Level of consciousness: awake and alert  Pain management: adequate  Airway patency: patent  Anesthetic complications: No anesthetic complications  PONV Status: none  Cardiovascular status: acceptable  Respiratory status: acceptable  Hydration status: acceptable

## 2018-02-16 NOTE — ANESTHESIA PREPROCEDURE EVALUATION
Anesthesia Evaluation     NPO Solid Status: > 8 hours  NPO Liquid Status: > 8 hours           Airway   Mallampati: III  TM distance: >3 FB  Neck ROM: full  possible difficult intubation  Dental    (+) poor dentition    Pulmonary - normal exam   Cardiovascular         Neuro/Psych  (+) psychiatric history,     GI/Hepatic/Renal/Endo    (+) obesity,  GERD,     Musculoskeletal     Abdominal    Substance History      OB/GYN          Other                      Anesthesia Plan    ASA 3     MAC     intravenous induction   Anesthetic plan and risks discussed with patient.

## 2018-02-16 NOTE — PLAN OF CARE
Problem: Patient Care Overview (Adult)  Goal: Plan of Care Review  Outcome: Ongoing (interventions implemented as appropriate)   02/16/18 1037   Coping/Psychosocial Response Interventions   Plan Of Care Reviewed With patient   Patient Care Overview   Progress no change   Outcome Evaluation   Outcome Summary/Follow up Plan vss         
Problem: Patient Care Overview (Adult)  Goal: Plan of Care Review  Outcome: Outcome(s) achieved Date Met: 02/16/18        
I will STOP taking the medications listed below when I get home from the hospital:  None

## 2018-02-20 ENCOUNTER — LAB (OUTPATIENT)
Dept: LAB | Facility: CLINIC | Age: 53
End: 2018-02-20

## 2018-02-20 ENCOUNTER — OFFICE VISIT (OUTPATIENT)
Dept: FAMILY MEDICINE CLINIC | Facility: CLINIC | Age: 53
End: 2018-02-20

## 2018-02-20 VITALS
RESPIRATION RATE: 20 BRPM | HEIGHT: 71 IN | SYSTOLIC BLOOD PRESSURE: 125 MMHG | WEIGHT: 245 LBS | DIASTOLIC BLOOD PRESSURE: 86 MMHG | HEART RATE: 75 BPM | TEMPERATURE: 97.6 F | BODY MASS INDEX: 34.3 KG/M2 | OXYGEN SATURATION: 98 %

## 2018-02-20 DIAGNOSIS — Z86.010 HISTORY OF COLON POLYPS: ICD-10-CM

## 2018-02-20 DIAGNOSIS — R10.84 GENERALIZED ABDOMINAL PAIN: Primary | ICD-10-CM

## 2018-02-20 DIAGNOSIS — F79 MENTAL RETARDATION: ICD-10-CM

## 2018-02-20 DIAGNOSIS — K21.00 GASTROESOPHAGEAL REFLUX DISEASE WITH ESOPHAGITIS: ICD-10-CM

## 2018-02-20 DIAGNOSIS — D50.0 IRON DEFICIENCY ANEMIA DUE TO CHRONIC BLOOD LOSS: ICD-10-CM

## 2018-02-20 LAB
FERRITIN SERPL-MCNC: 36.8 NG/ML (ref 17.9–464)
HCT VFR BLD AUTO: 38.6 % (ref 39–49)
HGB BLD-MCNC: 13.1 G/DL (ref 13.7–17.3)
IRON 24H UR-MRATE: 76 MCG/DL (ref 49–181)
IRON SATN MFR SERPL: 21 % (ref 20–55)
TIBC SERPL-MCNC: 361 MCG/DL (ref 261–462)

## 2018-02-20 PROCEDURE — 99214 OFFICE O/P EST MOD 30 MIN: CPT | Performed by: NURSE PRACTITIONER

## 2018-02-20 PROCEDURE — 85018 HEMOGLOBIN: CPT | Performed by: PHYSICIAN ASSISTANT

## 2018-02-20 PROCEDURE — 36415 COLL VENOUS BLD VENIPUNCTURE: CPT | Performed by: FAMILY MEDICINE

## 2018-02-20 PROCEDURE — 83540 ASSAY OF IRON: CPT | Performed by: PHYSICIAN ASSISTANT

## 2018-02-20 PROCEDURE — 85014 HEMATOCRIT: CPT | Performed by: PHYSICIAN ASSISTANT

## 2018-02-20 PROCEDURE — 83550 IRON BINDING TEST: CPT | Performed by: PHYSICIAN ASSISTANT

## 2018-02-20 PROCEDURE — 82728 ASSAY OF FERRITIN: CPT | Performed by: PHYSICIAN ASSISTANT

## 2018-02-20 NOTE — PROGRESS NOTES
Subjective   Orlin Mendes is a 52 y.o. male.     HPI Comments: Here today for shahriar.  Has had a recent colonoscopy and was found to have diverticulosis.  He is now taking iron for anemia.  Caregiver seems to think that when he has some of his screaming episodes he is having pain due to diverticulitis.    Abdominal Pain   This is a chronic problem. The current episode started more than 1 year ago. The onset quality is undetermined. The problem occurs intermittently. The problem has been waxing and waning. The pain is located in the generalized abdominal region. Pain scale: uanble to determine pain on a numeric scale. The pain is moderate. Quality: unable to determine. Pain radiation: generalized. Pertinent negatives include no anorexia, arthralgias, belching, constipation, diarrhea, dysuria, fever, flatus, frequency, headaches, hematochezia, hematuria, melena, myalgias, nausea, vomiting or weight loss. Nothing aggravates the pain. The pain is relieved by nothing. Treatments tried: has had colonoscopy. Prior diagnostic workup includes CT scan, GI consult and lower endoscopy. diverticulosis        The following portions of the patient's history were reviewed and updated as appropriate: allergies, current medications, past family history, past medical history, past social history, past surgical history and problem list.    Review of Systems   Constitutional: Negative.  Negative for fever and weight loss.   HENT: Negative.    Respiratory: Negative.    Cardiovascular: Negative.    Gastrointestinal: Positive for abdominal pain. Negative for anorexia, constipation, diarrhea, flatus, hematochezia, melena, nausea and vomiting.   Genitourinary: Negative for dysuria, frequency and hematuria.   Musculoskeletal: Negative.  Negative for arthralgias and myalgias.   Skin: Negative.    Neurological: Negative.  Negative for headaches.   Psychiatric/Behavioral: Negative.        Objective   Physical Exam   Constitutional: He is  oriented to person, place, and time. He appears well-developed and well-nourished. No distress.   HENT:   Head: Normocephalic.   Right Ear: External ear normal.   Left Ear: External ear normal.   Nose: Nose normal.   Mouth/Throat: Oropharynx is clear and moist. No oropharyngeal exudate.   Eyes: Pupils are equal, round, and reactive to light.   Neck: Normal range of motion. Neck supple. No thyromegaly present.   Cardiovascular: Normal rate, regular rhythm and normal heart sounds.  Exam reveals no friction rub.    No murmur heard.  Pulmonary/Chest: Effort normal and breath sounds normal. No respiratory distress. He has no wheezes. He has no rales.   Abdominal: Soft. Bowel sounds are normal. He exhibits no distension and no mass. There is no tenderness. There is no rebound and no guarding.   Musculoskeletal: Normal range of motion.   Neurological: He is alert and oriented to person, place, and time.   Skin: Skin is warm and dry.   Psychiatric: He has a normal mood and affect. Thought content normal.   Nursing note and vitals reviewed.      Assessment/Plan   Orlin was seen today for abdominal pain.    Diagnoses and all orders for this visit:    Generalized abdominal pain    Mental retardation    No changes in treatment at this time.

## 2018-02-27 ENCOUNTER — OFFICE VISIT (OUTPATIENT)
Dept: GASTROENTEROLOGY | Facility: CLINIC | Age: 53
End: 2018-02-27

## 2018-02-27 VITALS
SYSTOLIC BLOOD PRESSURE: 124 MMHG | HEIGHT: 71 IN | BODY MASS INDEX: 35.42 KG/M2 | WEIGHT: 253 LBS | DIASTOLIC BLOOD PRESSURE: 76 MMHG | HEART RATE: 76 BPM

## 2018-02-27 DIAGNOSIS — K21.00 GASTROESOPHAGEAL REFLUX DISEASE WITH ESOPHAGITIS: ICD-10-CM

## 2018-02-27 DIAGNOSIS — Z86.010 HISTORY OF COLON POLYPS: ICD-10-CM

## 2018-02-27 DIAGNOSIS — K57.30 DIVERTICULOSIS OF LARGE INTESTINE WITHOUT HEMORRHAGE: ICD-10-CM

## 2018-02-27 DIAGNOSIS — D50.0 IRON DEFICIENCY ANEMIA DUE TO CHRONIC BLOOD LOSS: Primary | ICD-10-CM

## 2018-02-27 PROCEDURE — 99213 OFFICE O/P EST LOW 20 MIN: CPT | Performed by: PHYSICIAN ASSISTANT

## 2018-02-27 RX ORDER — FERROUS SULFATE 325(65) MG
325 TABLET ORAL 2 TIMES DAILY WITH MEALS
Qty: 60 TABLET | Refills: 1 | Status: SHIPPED | OUTPATIENT
Start: 2018-02-27 | End: 2018-04-10 | Stop reason: SDUPTHER

## 2018-03-09 ENCOUNTER — OFFICE VISIT (OUTPATIENT)
Dept: PODIATRY | Facility: CLINIC | Age: 53
End: 2018-03-09

## 2018-03-09 VITALS — WEIGHT: 253 LBS | HEIGHT: 72 IN | BODY MASS INDEX: 34.27 KG/M2 | OXYGEN SATURATION: 99 % | HEART RATE: 82 BPM

## 2018-03-09 DIAGNOSIS — M79.674 CHRONIC TOE PAIN, BILATERAL: ICD-10-CM

## 2018-03-09 DIAGNOSIS — M79.675 CHRONIC TOE PAIN, BILATERAL: ICD-10-CM

## 2018-03-09 DIAGNOSIS — F79 MENTAL RETARDATION: ICD-10-CM

## 2018-03-09 DIAGNOSIS — B35.1 ONYCHOMYCOSIS: Primary | ICD-10-CM

## 2018-03-09 DIAGNOSIS — G89.29 CHRONIC TOE PAIN, BILATERAL: ICD-10-CM

## 2018-03-09 PROCEDURE — 11721 DEBRIDE NAIL 6 OR MORE: CPT | Performed by: PODIATRIST

## 2018-03-09 NOTE — PROGRESS NOTES
Orlin Iyercourtney  1965  52 y.o. male     Patient presents today with a care giver for routine nail care.    3/9/2018  Chief Complaint   Patient presents with   • Left Foot - nail care   • Right Foot - nail care           History of Present Illness    Patient presents to clinic today for routine footcare.  He is accompanied by his caregiver.  Patient relates to painful, elongated and discolored toenails.  Pain is relieved debridement.  He also complains of painful calluses on the bottom of both feet.  He has no other pedal complaints.        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 35-49) 01/21/2016   • Onychomycosis          Past Surgical History:   Procedure Laterality Date   • COLONOSCOPY     • COLONOSCOPY N/A 2/16/2018    Procedure: COLONOSCOPY;  Surgeon: Jus Aguilar MD;  Location: [facility] ENDOSCOPY;  Service:    • ENDOSCOPY N/A 10/27/2017    Procedure: ESOPHAGOGASTRODUODENOSCOPY;  Surgeon: Jus Augilar MD;  Location: [facility] ENDOSCOPY;  Service:    • UPPER GASTROINTESTINAL ENDOSCOPY     • UPPER GASTROINTESTINAL ENDOSCOPY  10/27/2017         Family History   Problem Relation Age of Onset   • Heart disease Mother    • Diabetes Mother    • Heart disease Father    • Heart disease Sister    • No Known Problems Brother    • No Known Problems Maternal Grandmother    • No Known Problems Maternal Grandfather    • No Known Problems Paternal Grandmother    • No Known Problems Paternal Grandfather    • No Known Problems Sister    • No Known Problems Sister          Social History     Social History   • Marital status: Single     Spouse  name: N/A   • Number of children: N/A   • Years of education: N/A     Occupational History   • Not on file.     Social History Main Topics   • Smoking status: Never Smoker   • Smokeless tobacco: Never Used   • Alcohol use No   • Drug use: No   • Sexual activity: Defer     Other Topics Concern   • Not on file     Social History Narrative         Current Outpatient Prescriptions   Medication Sig Dispense Refill   • acetaminophen (TYLENOL) 325 MG tablet Take 2 tablets by mouth Every 4 (Four) Hours As Needed for Mild Pain (1-3). 30 tablet 12   • busPIRone (BUSPAR) 15 MG tablet Take 15 mg by mouth 3 (Three) Times a Day.     • chlorhexidine (PERIDEX) 0.12 % solution Apply 15 mL to the mouth or throat 2 (Two) Times a Day.     • citalopram (CeleXA) 20 MG tablet Take 20 mg by mouth Daily.     • Dextromethorphan-Guaifenesin 5-100 MG/5ML liquid Take 1 teaspoon(s) by mouth 4 (Four) Times a Day As Needed (for cough). 118 mL 12   • esomeprazole (nexIUM) 40 MG capsule Take 1 capsule by mouth Daily. 30 capsule 12   • ferrous sulfate 325 (65 FE) MG tablet Take 1 tablet by mouth 2 (Two) Times a Day With Meals. 60 tablet 1   • guaiFENesin (MUCINEX) 600 MG 12 hr tablet Take 2 tablets by mouth Every 12 (Twelve) Hours. 20 tablet 0   • hydrocortisone 1 % ointment Apply  topically 3 (Three) Times a Day. Apply to rash or scrapes 56 g 12   • loperamide (IMODIUM) 2 MG capsule Take 1 capsule by mouth 4 (Four) Times a Day As Needed for Diarrhea. Take 1 cap after each loose stool not to exceed 8 per day prn 30 capsule 12   • magnesium hydroxide (MILK OF MAGNESIA) 400 MG/5ML suspension Take  by mouth. 2 tablespoons per day prn     • neomycin-polymyxin-pramoxine (ANTIBIOTIC PLUS PAIN RELIEF) 1 % cream Apply  topically 4 (Four) Times a Day As Needed (prn rash or scraps). 28 g 12   • QUEtiapine (SEROquel) 100 MG tablet Take 100 mg by mouth Every Night.     • tamsulosin (FLOMAX) 0.4 MG capsule 24 hr capsule Take 1 capsule by mouth Daily. 30 capsule  "12   • thioridazine (MELLARIL) 25 MG tablet Take 1 tablet by mouth 3 (Three) Times a Day. 1 tab in AM, 1 tab at noon and 2 tabs in PM (Patient taking differently: Take 25 mg by mouth 3 (Three) Times a Day. 4 tab in AM, 1 tab at noon and 2 tabs in PM) 90 tablet 12     No current facility-administered medications for this visit.          OBJECTIVE    Pulse 82  Ht 182.9 cm (72\")  Wt 115 kg (253 lb)  SpO2 99%  BMI 34.31 kg/m2      Review of Systems   Constitutional: Negative for chills and fever.   Cardiovascular: Negative for chest pain.   Gastrointestinal: Negative for constipation, diarrhea, nausea and vomiting.   Skin: Negative for wound. long painful toenails, callus Both feetkeletal: foot pain      Constitutional: well developed, well nourished    HEENT: Normocephalic and atraumatic, normal hearing    Respiratory: Non labored respirations noted    Cardiovascular:    DP/PT pulses palpable    CFT brisk  to all digits  Skin temp is warm to warm from proximal tibia to distal digits  Pedal hair growth present.   No erythema or edema noted     Musculoskeletal:  Muscle strength is 5/5 for all muscle groups tested   ROM of the 1st MTP is full without pain or crepitus  ROM of the MTJ is full without pain or crepitus    ROM of the STJ is full without pain or crepitus    ROM of the ankle joint is full without pain or crepitus        Dermatological:   Nails 2-5 are are discolored and elongated. Bilateral hallux nails are thickened, discolored, elongated with subungual hematoma and debris.  there is pain on palpation to the toenails  Skin is warm, dry and intact    Webspaces 1-4 bilateral are clean, dry and intact.   No subcutaneous nodules or masses noted    No open wounds noted   Hyperkeratotic lesion noted to the plantar aspect of the left fifth metatarsal head.   hyperkeratotic lesion noted to the plantar aspect of the right foot sub-fourth metatarsal head.      Neurological:   Protective sensation intact    Sensation " intact to light touch    DTR intact    Psychiatric: A&O x 3 with normal mood and affect. NAD.         Procedures        ASSESSMENT AND PLAN    Orlin was seen today for nail care and nail care.    Diagnoses and all orders for this visit:    Onychomycosis    Chronic toe pain, bilateral    Mental retardation    - Nails 1-5 bilateral were debrided in length and thickness with nail nipper and electric  to decrease fungal load and risk of infection.  - Trimmed hyperkeratotic lesions noted in objective with a #15 blade without incident.  - All questions were answered   - RTC 3 months          This document has been electronically signed by Brian Cash DPM on March 9, 2018 3:23 PM     3/9/2018  3:23 PM

## 2018-03-22 ENCOUNTER — OFFICE VISIT (OUTPATIENT)
Dept: FAMILY MEDICINE CLINIC | Facility: CLINIC | Age: 53
End: 2018-03-22

## 2018-03-22 VITALS
SYSTOLIC BLOOD PRESSURE: 118 MMHG | TEMPERATURE: 97.6 F | BODY MASS INDEX: 33.86 KG/M2 | RESPIRATION RATE: 20 BRPM | HEART RATE: 67 BPM | OXYGEN SATURATION: 96 % | HEIGHT: 72 IN | DIASTOLIC BLOOD PRESSURE: 72 MMHG | WEIGHT: 250 LBS

## 2018-03-22 DIAGNOSIS — R31.9 HEMATURIA, UNSPECIFIED TYPE: Primary | ICD-10-CM

## 2018-03-22 DIAGNOSIS — Z13.1 SCREENING FOR DIABETES MELLITUS: ICD-10-CM

## 2018-03-22 PROCEDURE — 80053 COMPREHEN METABOLIC PANEL: CPT | Performed by: NURSE PRACTITIONER

## 2018-03-22 PROCEDURE — 36415 COLL VENOUS BLD VENIPUNCTURE: CPT | Performed by: FAMILY MEDICINE

## 2018-03-22 PROCEDURE — 85014 HEMATOCRIT: CPT | Performed by: PHYSICIAN ASSISTANT

## 2018-03-22 PROCEDURE — 83550 IRON BINDING TEST: CPT | Performed by: PHYSICIAN ASSISTANT

## 2018-03-22 PROCEDURE — 83540 ASSAY OF IRON: CPT | Performed by: PHYSICIAN ASSISTANT

## 2018-03-22 PROCEDURE — 85018 HEMOGLOBIN: CPT | Performed by: PHYSICIAN ASSISTANT

## 2018-03-22 PROCEDURE — 82728 ASSAY OF FERRITIN: CPT | Performed by: PHYSICIAN ASSISTANT

## 2018-03-22 PROCEDURE — 99214 OFFICE O/P EST MOD 30 MIN: CPT | Performed by: NURSE PRACTITIONER

## 2018-03-22 NOTE — PROGRESS NOTES
Subjective   Orlin Mendes is a 52 y.o. male.     Health care worker thought she saw blood in the front of his shorts yesterday.  She thought he might have blood in his urine.  They have not actually seen any bloody colored urine.      Blood in Urine   This is a new problem. The current episode started yesterday. The problem has been resolved since onset. He reports no clotting in his urine stream. His pain is at a severity of 0/10. He is experiencing no pain. He describes his urine color as clear. Irritative symptoms include frequency and urgency. Irritative symptoms do not include nocturia. Obstructive symptoms do not include dribbling, incomplete emptying, an intermittent stream, a slower stream, straining or a weak stream. Pertinent negatives include no abdominal pain, chills, dysuria, facial swelling, fever, flank pain, genital pain, hesitancy, inability to urinate, nausea or vomiting. He is not sexually active. There is no history of BPH,  trauma, hypertension, kidney stones, prostatitis, recent infection, sickle cell disease, STDs or tobacco use.        The following portions of the patient's history were reviewed and updated as appropriate: allergies, current medications, past family history, past medical history, past social history, past surgical history and problem list.    Review of Systems   Constitutional: Negative.  Negative for chills and fever.   HENT: Negative.  Negative for facial swelling.    Respiratory: Negative.    Cardiovascular: Negative.    Gastrointestinal: Negative for abdominal pain, nausea and vomiting.   Genitourinary: Positive for frequency, hematuria and urgency. Negative for dysuria, flank pain, hesitancy, incomplete emptying and nocturia.   Musculoskeletal: Negative.    Skin: Negative.    Neurological: Negative.    Psychiatric/Behavioral: Negative.        Objective   Physical Exam   Constitutional: He appears well-developed and well-nourished. No distress.   HENT:   Head:  Normocephalic.   Eyes: Pupils are equal, round, and reactive to light.   Neck: Normal range of motion. Neck supple.   Cardiovascular: Normal rate, regular rhythm and normal heart sounds.  Exam reveals no friction rub.    No murmur heard.  Pulmonary/Chest: Effort normal and breath sounds normal. No respiratory distress. He has no wheezes. He has no rales.   Abdominal: Soft. Bowel sounds are normal. He exhibits no distension and no mass. There is no tenderness. There is no guarding.   Genitourinary:   Genitourinary Comments: Urine is clear light yellow and did stick is negative for blood or protein.   Musculoskeletal: Normal range of motion.   Neurological: He is alert.   Skin: Skin is warm and dry.   Psychiatric: His behavior is normal.   Nursing note and vitals reviewed.        Assessment/Plan   Oriln was seen today for blood in urine.    Diagnoses and all orders for this visit:    Hematuria, unspecified type    Screening for diabetes mellitus  -     Comprehensive metabolic panel      Is explained to his worker that the u/a is negative.

## 2018-03-23 LAB
ALBUMIN SERPL-MCNC: 4 G/DL (ref 3.4–4.8)
ALBUMIN/GLOB SERPL: 1.5 G/DL (ref 1.1–1.8)
ALP SERPL-CCNC: 53 U/L (ref 38–126)
ALT SERPL W P-5'-P-CCNC: 28 U/L (ref 21–72)
ANION GAP SERPL CALCULATED.3IONS-SCNC: 14 MMOL/L (ref 5–15)
AST SERPL-CCNC: 20 U/L (ref 17–59)
BILIRUB SERPL-MCNC: 0.2 MG/DL (ref 0.2–1.3)
BUN BLD-MCNC: 13 MG/DL (ref 7–21)
BUN/CREAT SERPL: 14.4 (ref 7–25)
CALCIUM SPEC-SCNC: 9.1 MG/DL (ref 8.4–10.2)
CHLORIDE SERPL-SCNC: 102 MMOL/L (ref 95–110)
CO2 SERPL-SCNC: 27 MMOL/L (ref 22–31)
CREAT BLD-MCNC: 0.9 MG/DL (ref 0.7–1.3)
FERRITIN SERPL-MCNC: 44.8 NG/ML (ref 17.9–464)
GFR SERPL CREATININE-BSD FRML MDRD: 89 ML/MIN/1.73 (ref 56–130)
GLOBULIN UR ELPH-MCNC: 2.7 GM/DL (ref 2.3–3.5)
GLUCOSE BLD-MCNC: 90 MG/DL (ref 60–100)
HCT VFR BLD AUTO: 38.2 % (ref 39–49)
HGB BLD-MCNC: 13.3 G/DL (ref 13.7–17.3)
IRON 24H UR-MRATE: 85 MCG/DL (ref 49–181)
IRON SATN MFR SERPL: 23 % (ref 20–55)
POTASSIUM BLD-SCNC: 4.1 MMOL/L (ref 3.5–5.1)
PROT SERPL-MCNC: 6.7 G/DL (ref 6.3–8.6)
SODIUM BLD-SCNC: 143 MMOL/L (ref 137–145)
TIBC SERPL-MCNC: 364 MCG/DL (ref 261–462)

## 2018-04-10 ENCOUNTER — OFFICE VISIT (OUTPATIENT)
Dept: GASTROENTEROLOGY | Facility: CLINIC | Age: 53
End: 2018-04-10

## 2018-04-10 VITALS
BODY MASS INDEX: 33.88 KG/M2 | SYSTOLIC BLOOD PRESSURE: 136 MMHG | DIASTOLIC BLOOD PRESSURE: 78 MMHG | HEIGHT: 71 IN | WEIGHT: 242 LBS

## 2018-04-10 DIAGNOSIS — R10.84 GENERALIZED ABDOMINAL PAIN: ICD-10-CM

## 2018-04-10 DIAGNOSIS — K21.00 GASTROESOPHAGEAL REFLUX DISEASE WITH ESOPHAGITIS: ICD-10-CM

## 2018-04-10 DIAGNOSIS — D50.0 IRON DEFICIENCY ANEMIA DUE TO CHRONIC BLOOD LOSS: Primary | ICD-10-CM

## 2018-04-10 PROCEDURE — 99213 OFFICE O/P EST LOW 20 MIN: CPT | Performed by: PHYSICIAN ASSISTANT

## 2018-04-10 RX ORDER — FERROUS SULFATE 325(65) MG
325 TABLET ORAL 2 TIMES DAILY WITH MEALS
Qty: 60 TABLET | Refills: 2 | Status: SHIPPED | OUTPATIENT
Start: 2018-04-10 | End: 2018-04-16 | Stop reason: SDUPTHER

## 2018-04-10 NOTE — PATIENT INSTRUCTIONS
MyPlate from [employer]  The general, healthful diet is based on the 2010 Dietary Guidelines for Americans. The amount of food you need to eat from each food group depends on your age, sex, and level of physical activity and can be individualized by a dietitian. Go to ChooseMyPlate.gov for more information.  What do I need to know about the MyPlate plan?  · Enjoy your food, but eat less.  · Avoid oversized portions.  ¨ ½ of your plate should include fruits and vegetables.  ¨ ¼ of your plate should be grains.  ¨ ¼ of your plate should be protein.  Grains   · Make at least half of your grains whole grains.  · For a 2,000 calorie daily food plan, eat 6 oz every day.  · 1 oz is about 1 slice bread, 1 cup cereal, or ½ cup cooked rice, cereal, or pasta.  Vegetables   · Make half your plate fruits and vegetables.  · For a 2,000 calorie daily food plan, eat 2½ cups every day.  · 1 cup is about 1 cup raw or cooked vegetables or vegetable juice or 2 cups raw leafy greens.  Fruits   · Make half your plate fruits and vegetables.  · For a 2,000 calorie daily food plan, eat 2 cups every day.  · 1 cup is about 1 cup fruit or 100% fruit juice or ½ cup dried fruit.  Protein   · For a 2,000 calorie daily food plan, eat 5½ oz every day.  · 1 oz is about 1 oz meat, poultry, or fish, ¼ cup cooked beans, 1 egg, 1 Tbsp peanut butter, or ½ oz nuts or seeds.  Dairy   · Switch to fat-free or low-fat (1%) milk.  · For a 2,000 calorie daily food plan, eat 3 cups every day.  · 1 cup is about 1 cup milk or yogurt or soy milk (soy beverage), 1½ oz natural cheese, or 2 oz processed cheese.  Fats, Oils, and Empty Calories   · Only small amounts of oils are recommended.  · Empty calories are calories from solid fats or added sugars.  · Compare sodium in foods like soup, bread, and frozen meals. Choose the foods with lower numbers.  · Drink water instead of sugary drinks.  What foods can I eat?  Grains   Whole grains such as whole wheat, quinoa, millet,  and bulgur. Bread, rolls, and pasta made from whole grains. Brown or wild rice. Hot or cold cereals made from whole grains and without added sugar.  Vegetables   All fresh vegetables, especially fresh red, dark green, or orange vegetables. Peas and beans. Low-sodium frozen or canned vegetables prepared without added salt. Low-sodium vegetable juices.  Fruits   All fresh, frozen, and dried fruits. Canned fruit packed in water or fruit juice without added sugar. Fruit juices without added sugar.  Meats and Other Protein Sources   Boiled, baked, or grilled lean meat trimmed of fat. Skinless poultry. Fresh seafood and shellfish. Canned seafood packed in water. Unsalted nuts and unsalted nut butters. Tofu. Dried beans and pea. Eggs.  Dairy   Low-fat or fat-free milk, yogurt, and cheeses.  Sweets and Desserts   Frozen desserts made from low-fat milk.  Fats and Oils   Olive, peanut, and canola oils and margarine. Salad dressing and mayonnaise made from these oils.  Other   Soups and casseroles made from allowed ingredients and without added fat or salt.  The items listed above may not be a complete list of recommended foods or beverages. Contact your dietitian for more options.   What foods are not recommended?  Grains   Sweetened, low-fiber cereals. Packaged baked goods. Snack crackers and chips. Cheese crackers, butter crackers, and biscuits. Frozen waffles, sweet breads, doughnuts, pastries, packaged baking mixes, pancakes, cakes, and cookies.  Vegetables   Regular canned or frozen vegetables or vegetables prepared with salt. Canned tomatoes. Canned tomato sauce. Fried vegetables. Vegetables in cream sauce or cheese sauce.  Fruits   Fruits packed in syrup or made with added sugar.  Meats and Other Protein Sources   Marbled or fatty meats such as ribs. Poultry with skin. Fried meats, poultry, eggs, or fish. Sausages, hot dogs, and deli meats such as pastrami, bologna, or salami.  Dairy   Whole milk, cream, cheeses made  from whole milk, sour cream. Ice cream or yogurt made from whole milk or with added sugar.  Beverages   For adults, no more than one alcoholic drink per day. Regular soft drinks or other sugary beverages. Juice drinks.  Sweets and Desserts   Sugary or fatty desserts, candy, and other sweets.  Fats and Oils   Solid shortening or partially hydrogenated oils. Solid margarine. Margarine that contains trans fats. Butter.  The items listed above may not be a complete list of foods and beverages to avoid. Contact your dietitian for more information.   This information is not intended to replace advice given to you by your health care provider. Make sure you discuss any questions you have with your health care provider.  Document Released: 01/06/2009 Document Revised: 05/25/2017 Document Reviewed: 11/26/2014  [employer] Interactive Patient Education © 2017 [employer]  BMI for Adults  Body mass index (BMI) is a number that is calculated from a person's weight and height. In most adults, the number is used to find how much of an adult's weight is made up of fat. BMI is not as accurate as a direct measure of body fat.  How is BMI calculated?  BMI is calculated by dividing weight in kilograms by height in meters squared. It can also be calculated by dividing weight in pounds by height in inches squared, then multiplying the resulting number by 703. Charts are available to help you find your BMI quickly and easily without doing this calculation.  How is BMI interpreted?  Health care professionals use BMI charts to identify whether an adult is underweight, at a normal weight, or overweight based on the following guidelines:  · Underweight: BMI less than 18.5.  · Normal weight: BMI between 18.5 and 24.9.  · Overweight: BMI between 25 and 29.9.  · Obese: BMI of 30 and above.  BMI is usually interpreted the same for males and females.  Weight includes both fat and muscle, so someone with a muscular build, such as an athlete, may  have a BMI that is higher than 24.9. In cases like these, BMI may not accurately depict body fat. To determine if excess body fat is the cause of a BMI of 25 or higher, further assessments may need to be done by a health care provider.  Why is BMI a useful tool?  BMI is used to identify a possible weight problem that may be related to a medical problem or may increase the risk for medical problems. BMI can also be used to promote changes to reach a healthy weight.  This information is not intended to replace advice given to you by your health care provider. Make sure you discuss any questions you have with your health care provider.  Document Released: 08/29/2005 Document Revised: 04/27/2017 Document Reviewed: 05/15/2015  [employer] Patient Education © 2017 [employer]

## 2018-04-10 NOTE — PROGRESS NOTES
Chief Complaint   Patient presents with   • Anemia   • Heartburn   • History of Colon polyps.       ENDO PROCEDURE ORDERED:    Subjective    Orlin Mendes is a 52 y.o. male. he is here today for follow-up.    History of Present Illness    Patient seen on a recheck of his GERD, anemia, history of colon polyps, last seen [id number]. Patient is again accompanied by a caretaker. He is on Nexium 40 mg daily, there has been no nausea, vomiting, dysphagia. Bowel movements are fairly regular. He takes Imodium for occasional diarrhea. No blood in the stool. Weight is down 11 pound since last visit. They are somewhat restricting his intake. Last colonoscopy in 02/2018 did show diverticulosis.     Laboratories for his anemia on 03/22/2018 showed normal CMP. Ferritin 44.8, iron 85, hemoglobin 13.3, hematocrit 38.2.     ASSESSMENT/PLAN: Iron deficiency anemia, slowly improving with supplementation. Will continue on iron supplement. He did repeat his laboratories a little bit earlier than I had intended, but he does show significant improvement. Currently has no complaints. I refilled his iron, will plan followup in 3 months with CBC, iron prior. Further pending clinical course and the results of the above.        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 35-49) 01/21/2016   • Onychomycosis      Past Surgical History:   Procedure Laterality Date   • COLONOSCOPY     • COLONOSCOPY N/A 2/16/2018    Procedure: COLONOSCOPY;  Surgeon: Jus Aguilar MD;  Location:   [facility] ENDOSCOPY;  Service:    • ENDOSCOPY N/A 10/27/2017    Procedure: ESOPHAGOGASTRODUODENOSCOPY;  Surgeon: Jus Aguilar MD;  Location: [facility] ENDOSCOPY;  Service:    • UPPER GASTROINTESTINAL ENDOSCOPY     • UPPER GASTROINTESTINAL ENDOSCOPY  10/27/2017     Family History   Problem Relation Age of Onset   • Heart disease Mother    • Diabetes Mother    • Heart disease Father    • Heart disease Sister    • No Known Problems Brother    • No Known Problems Maternal Grandmother    • No Known Problems Maternal Grandfather    • No Known Problems Paternal Grandmother    • No Known Problems Paternal Grandfather    • No Known Problems Sister    • No Known Problems Sister        Allergies   Allergen Reactions   • Zithromax [Azithromycin] Unknown (See Comments)     Unknown reaction     Social History     Social History   • Marital status: Single     Social History Main Topics   • Smoking status: Never Smoker   • Smokeless tobacco: Never Used   • Alcohol use No   • Drug use: No   • Sexual activity: Defer     Other Topics Concern   • Not on file       Current Outpatient Prescriptions:   •  acetaminophen (TYLENOL) 325 MG tablet, Take 2 tablets by mouth Every 4 (Four) Hours As Needed for Mild Pain (1-3)., Disp: 30 tablet, Rfl: 12  •  busPIRone (BUSPAR) 15 MG tablet, Take 15 mg by mouth 3 (Three) Times a Day., Disp: , Rfl:   •  chlorhexidine (PERIDEX) 0.12 % solution, Apply 15 mL to the mouth or throat 2 (Two) Times a Day., Disp: , Rfl:   •  citalopram (CeleXA) 20 MG tablet, Take 20 mg by mouth Daily., Disp: , Rfl:   •  Dextromethorphan-Guaifenesin 5-100 MG/5ML liquid, Take 1 teaspoon(s) by mouth 4 (Four) Times a Day As Needed (for cough)., Disp: 118 mL, Rfl: 12  •  esomeprazole (nexIUM) 40 MG capsule, Take 1 capsule by mouth Daily., Disp: 30 capsule, Rfl: 12  •  ferrous sulfate 325 (65 FE) MG tablet, Take 1 tablet by mouth 2 (Two) Times a Day With Meals., Disp: 60 tablet, Rfl: 2  •  hydrocortisone 1 % ointment, Apply  topically 3  "(Three) Times a Day. Apply to rash or scrapes, Disp: 56 g, Rfl: 12  •  loperamide (IMODIUM) 2 MG capsule, Take 1 capsule by mouth 4 (Four) Times a Day As Needed for Diarrhea. Take 1 cap after each loose stool not to exceed 8 per day prn, Disp: 30 capsule, Rfl: 12  •  magnesium hydroxide (MILK OF MAGNESIA) 400 MG/5ML suspension, Take  by mouth. 2 tablespoons per day prn, Disp: , Rfl:   •  neomycin-polymyxin-pramoxine (ANTIBIOTIC PLUS PAIN RELIEF) 1 % cream, Apply  topically 4 (Four) Times a Day As Needed (prn rash or scraps)., Disp: 28 g, Rfl: 12  •  QUEtiapine (SEROquel) 100 MG tablet, Take 100 mg by mouth Every Night., Disp: , Rfl:   •  tamsulosin (FLOMAX) 0.4 MG capsule 24 hr capsule, Take 1 capsule by mouth Daily., Disp: 30 capsule, Rfl: 12  •  thioridazine (MELLARIL) 25 MG tablet, Take 1 tablet by mouth 3 (Three) Times a Day. 1 tab in AM, 1 tab at noon and 2 tabs in PM (Patient taking differently: Take 25 mg by mouth 3 (Three) Times a Day. 4 tab in AM, 1 tab at noon and 2 tabs in PM), Disp: 90 tablet, Rfl: 12  Review of Systems  Review of Systems       Objective    /78 (BP Location: Left arm)   Ht 180.3 cm (71\")   Wt 110 kg (242 lb)   BMI 33.75 kg/m²   Physical Exam   Constitutional: He is oriented to person, place, and time. He appears well-developed and well-nourished. No distress.   HENT:   Head: Normocephalic and atraumatic.   Eyes: EOM are normal. Pupils are equal, round, and reactive to light.   Neck: Normal range of motion.   Cardiovascular: Normal rate, regular rhythm and normal heart sounds.    Pulmonary/Chest: Effort normal and breath sounds normal.   Abdominal: Soft. Bowel sounds are normal. He exhibits no shifting dullness, no distension, no abdominal bruit, no ascites and no mass. There is no hepatosplenomegaly. There is tenderness. There is no rigidity, no rebound, no guarding and no CVA tenderness. No hernia. Hernia confirmed negative in the ventral area.   Musculoskeletal: Normal range " of motion.   Neurological: He is alert and oriented to person, place, and time.   Skin: Skin is warm and dry.   Psychiatric: He has a normal mood and affect. His behavior is normal. Judgment and thought content normal.   Nursing note and vitals reviewed.    Assessment/Plan      1. Iron deficiency anemia due to chronic blood loss    2. Gastroesophageal reflux disease with esophagitis    3. Generalized abdominal pain    .   Orlin was seen today for anemia, heartburn and history of colon polyps..    Diagnoses and all orders for this visit:    Iron deficiency anemia due to chronic blood loss  -     Iron and TIBC; Future  -     Ferritin; Future  -     CBC & Differential; Future    Gastroesophageal reflux disease with esophagitis  -     Iron and TIBC; Future  -     Ferritin; Future  -     CBC & Differential; Future    Generalized abdominal pain    Other orders  -     ferrous sulfate 325 (65 FE) MG tablet; Take 1 tablet by mouth 2 (Two) Times a Day With Meals.        Orders placed during this encounter include:  Orders Placed This Encounter   Procedures   • Iron and TIBC     Standing Status:   Future     Standing Expiration Date:   10/7/2018   • Ferritin     Standing Status:   Future     Standing Expiration Date:   10/7/2018   • CBC & Differential     Standing Status:   Future     Standing Expiration Date:   10/7/2018     Order Specific Question:   Manual Differential     Answer:   No       Medications prescribed:  New Medications Ordered This Visit   Medications   • ferrous sulfate 325 (65 FE) MG tablet     Sig: Take 1 tablet by mouth 2 (Two) Times a Day With Meals.     Dispense:  60 tablet     Refill:  2     Discontinued Medications       Reason for Discontinue    guaiFENesin (MUCINEX) 600 MG 12 hr tablet *Therapy completed        Requested Prescriptions     Signed Prescriptions Disp Refills   • ferrous sulfate 325 (65 FE) MG tablet 60 tablet 2     Sig: Take 1 tablet by mouth 2 (Two) Times a Day With Meals.       Review  and/or summary of lab tests, radiology, procedures, medications. Review and summary of old records and obtaining of history. The risks and benefits of my recommendations, as well as other treatment options were discussed with the patient today. Questions were answered.    Follow-up: Return in about 3 months (around 7/10/2018), or if symptoms worsen or fail to improve, for lab prior.     * Surgery not found *      This document has been electronically signed by Bhargav Foreman PA-C on April 14, 2018 11:06 AM      Results for orders placed or performed in visit on 03/22/18   Comprehensive metabolic panel   Result Value Ref Range    Glucose 90 60 - 100 mg/dL    BUN 13 7 - 21 mg/dL    Creatinine 0.90 0.70 - 1.30 mg/dL    Sodium 143 137 - 145 mmol/L    Potassium 4.1 3.5 - 5.1 mmol/L    Chloride 102 95 - 110 mmol/L    CO2 27.0 22.0 - 31.0 mmol/L    Calcium 9.1 8.4 - 10.2 mg/dL    Total Protein 6.7 6.3 - 8.6 g/dL    Albumin 4.00 3.40 - 4.80 g/dL    ALT (SGPT) 28 21 - 72 U/L    AST (SGOT) 20 17 - 59 U/L    Alkaline Phosphatase 53 38 - 126 U/L    Total Bilirubin 0.2 0.2 - 1.3 mg/dL    eGFR Non  Amer 89 56 - 130 mL/min/1.73    Globulin 2.7 2.3 - 3.5 gm/dL    A/G Ratio 1.5 1.1 - 1.8 g/dL    BUN/Creatinine Ratio 14.4 7.0 - 25.0    Anion Gap 14.0 5.0 - 15.0 mmol/L   Results for orders placed or performed in visit on 02/27/18   Iron and TIBC   Result Value Ref Range    Iron 85 49 - 181 mcg/dL    TIBC 364 261 - 462 mcg/dL    Iron Saturation 23 20 - 55 %   Hemoglobin & Hematocrit, Blood   Result Value Ref Range    Hemoglobin 13.3 (L) 13.7 - 17.3 g/dL    Hematocrit 38.2 (L) 39.0 - 49.0 %   Ferritin   Result Value Ref Range    Ferritin 44.80 17.90 - 464.00 ng/mL   Results for orders placed or performed in visit on 02/20/18   Iron and TIBC   Result Value Ref Range    Iron 76 49 - 181 mcg/dL    TIBC 361 261 - 462 mcg/dL    Iron Saturation 21 20 - 55 %   Hemoglobin & Hematocrit, Blood   Result Value Ref Range    Hemoglobin 13.1  (L) 13.7 - 17.3 g/dL    Hematocrit 38.6 (L) 39.0 - 49.0 %   Ferritin   Result Value Ref Range    Ferritin 36.80 17.90 - 464.00 ng/mL   Results for orders placed or performed in visit on 12/29/17   POC Influenza A / B   Result Value Ref Range    Rapid Influenza A Ag NEG     Rapid Influenza B Ag NEG     Internal Control Passed Passed    Lot Number [id number]     Expiration Date 02/20/2020    Results for orders placed or performed in visit on 11/07/17   Iron and TIBC   Result Value Ref Range    Iron 31 (L) 49 - 181 mcg/dL    TIBC 375 261 - 462 mcg/dL    Iron Saturation 8 (L) 20 - 55 %   Hemoglobin & Hematocrit, Blood   Result Value Ref Range    Hemoglobin 13.1 (L) 13.7 - 17.3 g/dL    Hematocrit 38.4 (L) 39.0 - 49.0 %   Ferritin   Result Value Ref Range    Ferritin 26.70 17.90 - 464.00 ng/mL   Results for orders placed or performed during the hospital encounter of 10/27/17   Tissue Pathology Exam - Tissue, Gastric, Antrum   Result Value Ref Range    Case Report       Surgical Pathology Report                         Case: [id number]                                  Authorizing Provider:  Jus Aguilar MD         Collected:           10/27/2017 04:52 PM          Ordering Location:     [facility]             Received:            10/30/2017 07:48 AM                                 [facility]                                                     Pathologist:           Liam Evangelista MD                                                          Specimens:   1) - Gastric, Antrum, antrum bx                                                                     2) - Esophagus, Distal, distal esophagus bx                                                Final Diagnosis       1.  MUCOSA, ANTRUM OF STOMACH:  REACTIVE GASTROPATHY.    2.  MUCOSA, DISTAL ESOPHAGUS:  REACTIVE CHANGE OF SQUAMOUS MUCOSA AND CARDIAC GASTRIC MUCOSA.  NEGATIVE FOR DYSPLASIA AND GOBLET CELL METAPLASIA (ALCIAN BLUE/PAS STAIN).      Gross  "Description       1.  The first container is labeled \"antrum of stomach\" and has nodular bits of white soft tissue measuring 0.4 cc in aggregate.  The entire specimen is embedded as 1A.     2.  The second container is labeled \"distal esophagus\" and has a nodular fragment of white soft tissue measuring 0.2 cm in greatest dimension.  The entire specimen is embedded as 2A.       Embedded Images     Results for orders placed or performed in visit on 10/25/17   Iron and TIBC   Result Value Ref Range    Iron 47 (L) 49 - 181 mcg/dL    TIBC 333 261 - 462 mcg/dL    Iron Saturation 14 (L) 20 - 55 %   Hemoglobin & Hematocrit, Blood   Result Value Ref Range    Hemoglobin 10.5 (L) 13.7 - 17.3 g/dL    Hematocrit 30.3 (L) 39.0 - 49.0 %   Ferritin   Result Value Ref Range    Ferritin 32.70 17.90 - 464.00 ng/mL   Results for orders placed or performed in visit on 10/20/17   CBC Auto Differential   Result Value Ref Range    WBC 6.04 3.20 - 9.80 10*3/mm3    RBC 3.64 (L) 4.37 - 5.74 10*6/mm3    Hemoglobin 10.8 (L) 13.7 - 17.3 g/dL    Hematocrit 32.3 (L) 39.0 - 49.0 %    MCV 88.7 80.0 - 98.0 fL    MCH 29.7 26.5 - 34.0 pg    MCHC 33.4 31.5 - 36.3 g/dL    RDW 13.3 11.5 - 14.5 %    RDW-SD 42.6 35.1 - 43.9 fl    MPV 10.5 8.0 - 12.0 fL    Platelets 245 150 - 450 10*3/mm3    Neutrophil % 52.8 37.0 - 80.0 %    Lymphocyte % 32.8 10.0 - 50.0 %    Monocyte % 10.3 0.0 - 12.0 %    Eosinophil % 2.3 0.0 - 7.0 %    Basophil % 0.3 0.0 - 2.0 %    Immature Grans % 1.5 (H) 0.0 - 0.5 %    Neutrophils, Absolute 3.19 2.00 - 8.60 10*3/mm3    Lymphocytes, Absolute 1.98 0.60 - 4.20 10*3/mm3    Monocytes, Absolute 0.62 0.00 - 0.90 10*3/mm3    Eosinophils, Absolute 0.14 0.00 - 0.70 10*3/mm3    Basophils, Absolute 0.02 0.00 - 0.20 10*3/mm3    Immature Grans, Absolute 0.09 (H) 0.00 - 0.02 10*3/mm3   Comprehensive Metabolic Panel   Result Value Ref Range    Glucose 101 (H) 60 - 100 mg/dL    BUN 7 7 - 21 mg/dL    Creatinine 0.90 0.70 - 1.30 mg/dL    Sodium 139 137 - " 145 mmol/L    Potassium 3.5 3.5 - 5.1 mmol/L    Chloride 100 95 - 110 mmol/L    CO2 28.0 22.0 - 31.0 mmol/L    Calcium 8.5 8.4 - 10.2 mg/dL    Total Protein 5.9 (L) 6.3 - 8.6 g/dL    Albumin 3.40 3.40 - 4.80 g/dL    ALT (SGPT) 47 21 - 72 U/L    AST (SGOT) 17 17 - 59 U/L    Alkaline Phosphatase 48 38 - 126 U/L    Total Bilirubin 0.2 0.2 - 1.3 mg/dL    eGFR Non  Amer 89 56 - 130 mL/min/1.73    Globulin 2.5 2.3 - 3.5 gm/dL    A/G Ratio 1.4 1.1 - 1.8 g/dL    BUN/Creatinine Ratio 7.8 7.0 - 25.0    Anion Gap 11.0 5.0 - 15.0 mmol/L   Results for orders placed or performed in visit on 05/25/17   PSA   Result Value Ref Range    PSA 0.293 0.000 - 4.000 ng/mL   Comprehensive metabolic panel   Result Value Ref Range    Glucose 90 60 - 100 mg/dL    BUN 11 7 - 21 mg/dL    Creatinine 0.95 0.70 - 1.30 mg/dL    Sodium 142 137 - 145 mmol/L    Potassium 3.9 3.5 - 5.1 mmol/L    Chloride 100 95 - 110 mmol/L    CO2 30.0 22.0 - 31.0 mmol/L    Calcium 9.2 8.4 - 10.2 mg/dL    Total Protein 6.8 6.3 - 8.6 g/dL    Albumin 3.90 3.40 - 4.80 g/dL    ALT (SGPT) 39 21 - 72 U/L    AST (SGOT) 22 17 - 59 U/L    Alkaline Phosphatase 52 38 - 126 U/L    Total Bilirubin 0.5 0.2 - 1.3 mg/dL    eGFR Non  Amer 84 56 - 130 mL/min/1.73    Globulin 2.9 2.3 - 3.5 gm/dL    A/G Ratio 1.3 1.1 - 1.8 g/dL    BUN/Creatinine Ratio 11.6 7.0 - 25.0    Anion Gap 12.0 5.0 - 15.0 mmol/L     *Note: Due to a large number of results and/or encounters for the requested time period, some results have not been displayed. A complete set of results can be found in Results Review.       Some portions of this note have been dictated using voice recognition software and may contain errors and/or omissions.

## 2018-04-16 RX ORDER — FERROUS SULFATE 325(65) MG
325 TABLET ORAL 2 TIMES DAILY WITH MEALS
Qty: 60 TABLET | Refills: 5 | Status: SHIPPED | OUTPATIENT
Start: 2018-04-16 | End: 2018-09-25 | Stop reason: SDUPTHER

## 2018-06-15 ENCOUNTER — OFFICE VISIT (OUTPATIENT)
Dept: PODIATRY | Facility: CLINIC | Age: 53
End: 2018-06-15

## 2018-06-15 VITALS — OXYGEN SATURATION: 96 % | HEART RATE: 79 BPM | WEIGHT: 242.51 LBS | HEIGHT: 71 IN | BODY MASS INDEX: 33.95 KG/M2

## 2018-06-15 DIAGNOSIS — G89.29 CHRONIC TOE PAIN, BILATERAL: ICD-10-CM

## 2018-06-15 DIAGNOSIS — B35.1 ONYCHOMYCOSIS: Primary | ICD-10-CM

## 2018-06-15 DIAGNOSIS — M79.674 CHRONIC TOE PAIN, BILATERAL: ICD-10-CM

## 2018-06-15 DIAGNOSIS — M79.675 CHRONIC TOE PAIN, BILATERAL: ICD-10-CM

## 2018-06-15 DIAGNOSIS — F79 MENTAL RETARDATION: ICD-10-CM

## 2018-06-15 PROCEDURE — 11721 DEBRIDE NAIL 6 OR MORE: CPT | Performed by: PODIATRIST

## 2018-06-15 NOTE — PROGRESS NOTES
Orlin Iyercourtney  1965  52 y.o. male     Patient presents today with a care giver for routine nail care.    6/15/2018  Chief Complaint   Patient presents with   • Left Foot - Nail Care   • Right Foot - Nail Care           History of Present Illness    Patient presents to clinic today for routine footcare.  He is accompanied by his caregiver.  Patient relates to painful, elongated and discolored toenails.  Pain is relieved debridement.  He has no other pedal complaints.        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 35-49) 01/21/2016   • Onychomycosis          Past Surgical History:   Procedure Laterality Date   • COLONOSCOPY     • COLONOSCOPY N/A 2/16/2018    Procedure: COLONOSCOPY;  Surgeon: Jus Aguilar MD;  Location: [facility] ENDOSCOPY;  Service:    • ENDOSCOPY N/A 10/27/2017    Procedure: ESOPHAGOGASTRODUODENOSCOPY;  Surgeon: Jus Aguilar MD;  Location: [facility] ENDOSCOPY;  Service:    • UPPER GASTROINTESTINAL ENDOSCOPY     • UPPER GASTROINTESTINAL ENDOSCOPY  10/27/2017         Family History   Problem Relation Age of Onset   • Heart disease Mother    • Diabetes Mother    • Heart disease Father    • Heart disease Sister    • No Known Problems Brother    • No Known Problems Maternal Grandmother    • No Known Problems Maternal Grandfather    • No Known Problems Paternal Grandmother    • No Known Problems Paternal Grandfather    • No Known Problems Sister    • No Known Problems Sister          Social History     Social History   • Marital status: Single     Spouse name: N/A   • Number of children: N/A   • Years of education: N/A      Occupational History   • Not on file.     Social History Main Topics   • Smoking status: Never Smoker   • Smokeless tobacco: Never Used   • Alcohol use No   • Drug use: No   • Sexual activity: Defer     Other Topics Concern   • Not on file     Social History Narrative   • No narrative on file         Current Outpatient Prescriptions   Medication Sig Dispense Refill   • acetaminophen (TYLENOL) 325 MG tablet Take 2 tablets by mouth Every 4 (Four) Hours As Needed for Mild Pain (1-3). 30 tablet 12   • busPIRone (BUSPAR) 15 MG tablet Take 15 mg by mouth 3 (Three) Times a Day.     • chlorhexidine (PERIDEX) 0.12 % solution Apply 15 mL to the mouth or throat 2 (Two) Times a Day.     • citalopram (CeleXA) 20 MG tablet Take 20 mg by mouth Daily.     • Dextromethorphan-Guaifenesin 5-100 MG/5ML liquid Take 1 teaspoon(s) by mouth 4 (Four) Times a Day As Needed (for cough). 118 mL 12   • esomeprazole (nexIUM) 40 MG capsule Take 1 capsule by mouth Daily. 30 capsule 12   • ferrous sulfate 325 (65 FE) MG tablet Take 1 tablet by mouth 2 (Two) Times a Day With Meals. 60 tablet 5   • hydrocortisone 1 % ointment Apply  topically 3 (Three) Times a Day. Apply to rash or scrapes 56 g 12   • loperamide (IMODIUM) 2 MG capsule Take 1 capsule by mouth 4 (Four) Times a Day As Needed for Diarrhea. Take 1 cap after each loose stool not to exceed 8 per day prn 30 capsule 12   • magnesium hydroxide (MILK OF MAGNESIA) 400 MG/5ML suspension Take  by mouth. 2 tablespoons per day prn     • neomycin-polymyxin-pramoxine (ANTIBIOTIC PLUS PAIN RELIEF) 1 % cream Apply  topically 4 (Four) Times a Day As Needed (prn rash or scraps). 28 g 12   • QUEtiapine (SEROquel) 100 MG tablet Take 100 mg by mouth Every Night.     • tamsulosin (FLOMAX) 0.4 MG capsule 24 hr capsule Take 1 capsule by mouth Daily. 30 capsule 12   • thioridazine (MELLARIL) 25 MG tablet Take 1 tablet by mouth 3 (Three) Times a Day. 1 tab in AM, 1 tab at noon and 2 tabs in PM (Patient taking  "differently: Take 25 mg by mouth 3 (Three) Times a Day. 4 tab in AM, 1 tab at noon and 2 tabs in PM) 90 tablet 12     No current facility-administered medications for this visit.          OBJECTIVE    Pulse 79   Ht 180.3 cm (71\")   Wt 110 kg (242 lb 8.1 oz)   SpO2 96%   BMI 33.82 kg/m²       Review of Systems   Constitutional: Negative for chills and fever.   Cardiovascular: Negative for chest pain.   Gastrointestinal: Negative for constipation, diarrhea, nausea and vomiting.   Skin: Negative for wound. long painful toenails      Constitutional: well developed, well nourished    HEENT: Normocephalic and atraumatic, normal hearing    Respiratory: Non labored respirations noted    Cardiovascular:    DP/PT pulses palpable    CFT brisk  to all digits  No erythema or edema noted     Musculoskeletal:  Muscle strength is 5/5 for all muscle groups tested   ROM of the 1st MTP is full without pain or crepitus  ROM of the MTJ is full without pain or crepitus    ROM of the STJ is full without pain or crepitus    ROM of the ankle joint is full without pain or crepitus        Dermatological:   Nails 2-5 are are discolored and elongated. Bilateral hallux nails are thickened, discolored, elongated with subungual hematoma and debris.  there is pain on palpation to the toenails  Skin is warm, dry and intact    Webspaces 1-4 bilateral are clean, dry and intact.   No subcutaneous nodules or masses noted    No open wounds noted   Hyperkeratotic lesion noted to the plantar aspect of the left fifth metatarsal head.   hyperkeratotic lesion noted to the plantar aspect of the right foot sub-fourth metatarsal head.      Neurological:   Protective sensation intact    Sensation intact to light touch    DTR intact    Psychiatric: A&O x 3 with normal mood and affect. NAD.         Procedures        ASSESSMENT AND PLAN    Orlin was seen today for nail care and nail care.    Diagnoses and all orders for this visit:    Onychomycosis    Chronic " toe pain, bilateral    Mental retardation      - Nails 1-5 bilateral were debrided in length and thickness with nail nipper and electric  to decrease fungal load and risk of infection.  - All questions were answered   - RTC 3 months          This document has been electronically signed by Brian Cash DPM on Shirley 15, 2018 12:11 PM     6/15/2018  12:11 PM

## 2018-07-23 ENCOUNTER — LAB (OUTPATIENT)
Dept: LAB | Facility: CLINIC | Age: 53
End: 2018-07-23

## 2018-07-23 DIAGNOSIS — K21.00 GASTROESOPHAGEAL REFLUX DISEASE WITH ESOPHAGITIS: ICD-10-CM

## 2018-07-23 DIAGNOSIS — D50.0 IRON DEFICIENCY ANEMIA DUE TO CHRONIC BLOOD LOSS: ICD-10-CM

## 2018-07-23 PROCEDURE — 82728 ASSAY OF FERRITIN: CPT | Performed by: PHYSICIAN ASSISTANT

## 2018-07-23 PROCEDURE — 36415 COLL VENOUS BLD VENIPUNCTURE: CPT | Performed by: FAMILY MEDICINE

## 2018-07-23 PROCEDURE — 83540 ASSAY OF IRON: CPT | Performed by: PHYSICIAN ASSISTANT

## 2018-07-23 PROCEDURE — 83550 IRON BINDING TEST: CPT | Performed by: PHYSICIAN ASSISTANT

## 2018-07-23 PROCEDURE — 85025 COMPLETE CBC W/AUTO DIFF WBC: CPT | Performed by: PHYSICIAN ASSISTANT

## 2018-07-24 LAB
BASOPHILS # BLD AUTO: 0.02 10*3/MM3 (ref 0–0.2)
BASOPHILS NFR BLD AUTO: 0.5 % (ref 0–2)
DEPRECATED RDW RBC AUTO: 39.7 FL (ref 35.1–43.9)
EOSINOPHIL # BLD AUTO: 0.22 10*3/MM3 (ref 0–0.7)
EOSINOPHIL NFR BLD AUTO: 5.1 % (ref 0–7)
ERYTHROCYTE [DISTWIDTH] IN BLOOD BY AUTOMATED COUNT: 12.3 % (ref 11.5–14.5)
FERRITIN SERPL-MCNC: 77.4 NG/ML (ref 17.9–464)
HCT VFR BLD AUTO: 37.3 % (ref 39–49)
HGB BLD-MCNC: 12.7 G/DL (ref 13.7–17.3)
IMM GRANULOCYTES # BLD: 0 10*3/MM3 (ref 0–0.02)
IMM GRANULOCYTES NFR BLD: 0 % (ref 0–0.5)
IRON 24H UR-MRATE: 72 MCG/DL (ref 49–181)
IRON SATN MFR SERPL: 21 % (ref 20–55)
LYMPHOCYTES # BLD AUTO: 1.89 10*3/MM3 (ref 0.6–4.2)
LYMPHOCYTES NFR BLD AUTO: 43.4 % (ref 10–50)
MCH RBC QN AUTO: 30.2 PG (ref 26.5–34)
MCHC RBC AUTO-ENTMCNC: 34 G/DL (ref 31.5–36.3)
MCV RBC AUTO: 88.8 FL (ref 80–98)
MONOCYTES # BLD AUTO: 0.35 10*3/MM3 (ref 0–0.9)
MONOCYTES NFR BLD AUTO: 8 % (ref 0–12)
NEUTROPHILS # BLD AUTO: 1.87 10*3/MM3 (ref 2–8.6)
NEUTROPHILS NFR BLD AUTO: 43 % (ref 37–80)
PLATELET # BLD AUTO: 153 10*3/MM3 (ref 150–450)
PMV BLD AUTO: 11.4 FL (ref 8–12)
RBC # BLD AUTO: 4.2 10*6/MM3 (ref 4.37–5.74)
TIBC SERPL-MCNC: 343 MCG/DL (ref 261–462)
WBC NRBC COR # BLD: 4.35 10*3/MM3 (ref 3.2–9.8)

## 2018-07-31 ENCOUNTER — OFFICE VISIT (OUTPATIENT)
Dept: GASTROENTEROLOGY | Facility: CLINIC | Age: 53
End: 2018-07-31

## 2018-07-31 VITALS
HEART RATE: 74 BPM | BODY MASS INDEX: 35 KG/M2 | HEIGHT: 71 IN | WEIGHT: 250 LBS | DIASTOLIC BLOOD PRESSURE: 78 MMHG | SYSTOLIC BLOOD PRESSURE: 122 MMHG

## 2018-07-31 DIAGNOSIS — R10.84 GENERALIZED ABDOMINAL PAIN: ICD-10-CM

## 2018-07-31 DIAGNOSIS — K57.30 DIVERTICULOSIS OF LARGE INTESTINE WITHOUT HEMORRHAGE: ICD-10-CM

## 2018-07-31 DIAGNOSIS — K21.00 GASTROESOPHAGEAL REFLUX DISEASE WITH ESOPHAGITIS: ICD-10-CM

## 2018-07-31 DIAGNOSIS — D50.0 IRON DEFICIENCY ANEMIA DUE TO CHRONIC BLOOD LOSS: Primary | ICD-10-CM

## 2018-07-31 PROCEDURE — 99213 OFFICE O/P EST LOW 20 MIN: CPT | Performed by: PHYSICIAN ASSISTANT

## 2018-08-10 DIAGNOSIS — F41.9 ANXIETY: ICD-10-CM

## 2018-08-10 DIAGNOSIS — R21 RASH: ICD-10-CM

## 2018-08-10 DIAGNOSIS — K21.9 GASTROESOPHAGEAL REFLUX DISEASE, ESOPHAGITIS PRESENCE NOT SPECIFIED: ICD-10-CM

## 2018-08-10 RX ORDER — TAMSULOSIN HYDROCHLORIDE 0.4 MG/1
CAPSULE ORAL
Qty: 30 CAPSULE | Refills: 11 | Status: SHIPPED | OUTPATIENT
Start: 2018-08-10 | End: 2018-08-13 | Stop reason: SDUPTHER

## 2018-08-10 RX ORDER — DIAPER,BRIEF,INFANT-TODD,DISP
EACH MISCELLANEOUS
Qty: 56 G | Refills: 11 | Status: SHIPPED | OUTPATIENT
Start: 2018-08-10 | End: 2018-09-10 | Stop reason: SDUPTHER

## 2018-08-10 RX ORDER — THIORIDAZINE HYDROCHLORIDE 25 MG/1
TABLET, FILM COATED ORAL
Qty: 210 TABLET | Refills: 11 | Status: SHIPPED | OUTPATIENT
Start: 2018-08-10 | End: 2018-08-13 | Stop reason: SDUPTHER

## 2018-08-10 RX ORDER — ESOMEPRAZOLE MAGNESIUM 40 MG/1
CAPSULE, DELAYED RELEASE ORAL
Qty: 30 CAPSULE | Refills: 11 | Status: SHIPPED | OUTPATIENT
Start: 2018-08-10 | End: 2018-08-13 | Stop reason: SDUPTHER

## 2018-08-13 DIAGNOSIS — K21.9 GASTROESOPHAGEAL REFLUX DISEASE, ESOPHAGITIS PRESENCE NOT SPECIFIED: ICD-10-CM

## 2018-08-13 RX ORDER — THIORIDAZINE HYDROCHLORIDE 25 MG/1
TABLET, FILM COATED ORAL
Qty: 210 TABLET | Refills: 11 | Status: SHIPPED | OUTPATIENT
Start: 2018-08-13 | End: 2019-01-16 | Stop reason: SDUPTHER

## 2018-08-13 RX ORDER — TAMSULOSIN HYDROCHLORIDE 0.4 MG/1
CAPSULE ORAL
Qty: 30 CAPSULE | Refills: 11 | Status: SHIPPED | OUTPATIENT
Start: 2018-08-13 | End: 2019-08-05 | Stop reason: SDUPTHER

## 2018-08-13 RX ORDER — ESOMEPRAZOLE MAGNESIUM 40 MG/1
CAPSULE, DELAYED RELEASE ORAL
Qty: 30 CAPSULE | Refills: 11 | Status: SHIPPED | OUTPATIENT
Start: 2018-08-13 | End: 2019-08-05 | Stop reason: SDUPTHER

## 2018-08-14 ENCOUNTER — DOCUMENTATION (OUTPATIENT)
Dept: GASTROENTEROLOGY | Facility: CLINIC | Age: 53
End: 2018-08-14

## 2018-08-14 NOTE — PROGRESS NOTES
Patients care giver returned 3 Hemocuult cards today. All 3 cards were developed in office with negative results.

## 2018-08-21 ENCOUNTER — OFFICE VISIT (OUTPATIENT)
Dept: FAMILY MEDICINE CLINIC | Facility: CLINIC | Age: 53
End: 2018-08-21

## 2018-08-21 VITALS
WEIGHT: 251 LBS | TEMPERATURE: 98.4 F | HEART RATE: 70 BPM | HEIGHT: 71 IN | OXYGEN SATURATION: 98 % | BODY MASS INDEX: 35.14 KG/M2 | SYSTOLIC BLOOD PRESSURE: 118 MMHG | RESPIRATION RATE: 20 BRPM | DIASTOLIC BLOOD PRESSURE: 64 MMHG

## 2018-08-21 DIAGNOSIS — F41.9 ANXIETY: ICD-10-CM

## 2018-08-21 DIAGNOSIS — F79 MENTAL RETARDATION: Primary | ICD-10-CM

## 2018-08-21 PROCEDURE — 99213 OFFICE O/P EST LOW 20 MIN: CPT | Performed by: NURSE PRACTITIONER

## 2018-08-21 NOTE — PROGRESS NOTES
Subjective   Orlin Mendes is a 53 y.o. male.     Here today for annual physical.  Is a client of [facility] and has mr[facility]hane  Requires full time supervision.  Is doing well and needs no refills.  He cont to need his meds for anxiety and agitation.        Anxiety   Presents for follow-up visit. Symptoms include excessive worry, insomnia, irritability, nervous/anxious behavior, panic and restlessness. Patient reports no chest pain, compulsions, confusion, decreased concentration, depressed mood, dizziness, dry mouth, feeling of choking, impotence, malaise, muscle tension, nausea, obsessions, palpitations, shortness of breath or suicidal ideas. Symptoms occur constantly. The severity of symptoms is interfering with daily activities. The quality of sleep is fair.     Compliance with medications is %.        The following portions of the patient's history were reviewed and updated as appropriate: allergies, current medications, past family history, past medical history, past social history, past surgical history and problem list.    Review of Systems   Constitutional: Positive for irritability.   HENT: Negative.    Respiratory: Negative.  Negative for shortness of breath.    Cardiovascular: Negative.  Negative for chest pain and palpitations.   Gastrointestinal: Negative for nausea.   Genitourinary: Negative for impotence.   Skin: Negative.    Neurological: Negative for dizziness and confusion.   Psychiatric/Behavioral: Positive for agitation and behavioral problems. Negative for decreased concentration, suicidal ideas and depressed mood. The patient is nervous/anxious and has insomnia.         Mental retardation       Objective   Physical Exam   Constitutional: He is oriented to person, place, and time. He appears well-developed and well-nourished. No distress.   HENT:   Head: Normocephalic.   Right Ear: External ear normal.   Left Ear: External ear normal.   Nose: Nose normal.   Mouth/Throat: Oropharynx is  clear and moist. No oropharyngeal exudate.   Eyes: Pupils are equal, round, and reactive to light.   Neck: Normal range of motion. Neck supple. No thyromegaly present.   Cardiovascular: Normal rate, regular rhythm and normal heart sounds.  Exam reveals no friction rub.    No murmur heard.  Pulmonary/Chest: Effort normal and breath sounds normal. No respiratory distress. He has no wheezes. He has no rales.   Abdominal: Soft.   Musculoskeletal: Normal range of motion.   Neurological: He is alert and oriented to person, place, and time.   Skin: Skin is warm.   Psychiatric:   Parrots what is said to him.  Is unable to initiate any conversation and is able to follow simple directions.   Nursing note and vitals reviewed.        Assessment/Plan   Orlin was seen today for heartburn and annual exam.    Diagnoses and all orders for this visit:    Mental retardation    Anxiety

## 2018-09-10 DIAGNOSIS — R05.9 COUGH: ICD-10-CM

## 2018-09-10 DIAGNOSIS — R21 RASH: ICD-10-CM

## 2018-09-10 DIAGNOSIS — R19.7 DIARRHEA, UNSPECIFIED TYPE: ICD-10-CM

## 2018-09-10 RX ORDER — LOPERAMIDE HYDROCHLORIDE 2 MG/1
2 CAPSULE ORAL 4 TIMES DAILY PRN
Qty: 30 CAPSULE | Refills: 12 | Status: SHIPPED | OUTPATIENT
Start: 2018-09-10 | End: 2018-09-14 | Stop reason: SDUPTHER

## 2018-09-10 RX ORDER — DIAPER,BRIEF,INFANT-TODD,DISP
EACH MISCELLANEOUS
Qty: 56 G | Refills: 11 | Status: SHIPPED | OUTPATIENT
Start: 2018-09-10 | End: 2018-09-14 | Stop reason: SDUPTHER

## 2018-09-14 DIAGNOSIS — R50.9 FEVER, UNSPECIFIED FEVER CAUSE: ICD-10-CM

## 2018-09-14 DIAGNOSIS — R19.7 DIARRHEA, UNSPECIFIED TYPE: ICD-10-CM

## 2018-09-14 DIAGNOSIS — R05.9 COUGH: ICD-10-CM

## 2018-09-14 DIAGNOSIS — R21 RASH: ICD-10-CM

## 2018-09-14 RX ORDER — DIAPER,BRIEF,INFANT-TODD,DISP
EACH MISCELLANEOUS
Qty: 56 G | Refills: 11 | Status: SHIPPED | OUTPATIENT
Start: 2018-09-14

## 2018-09-14 RX ORDER — LOPERAMIDE HYDROCHLORIDE 2 MG/1
2 CAPSULE ORAL 4 TIMES DAILY PRN
Qty: 30 CAPSULE | Refills: 12 | Status: SHIPPED | OUTPATIENT
Start: 2018-09-14 | End: 2019-09-03 | Stop reason: SDUPTHER

## 2018-09-20 DIAGNOSIS — R50.9 FEVER, UNSPECIFIED FEVER CAUSE: ICD-10-CM

## 2018-09-20 RX ORDER — ACETAMINOPHEN 325 MG/1
650 TABLET ORAL EVERY 4 HOURS PRN
Qty: 30 TABLET | Refills: 11 | Status: SHIPPED | OUTPATIENT
Start: 2018-09-20 | End: 2019-09-03 | Stop reason: SDUPTHER

## 2018-09-21 ENCOUNTER — OFFICE VISIT (OUTPATIENT)
Dept: PODIATRY | Facility: CLINIC | Age: 53
End: 2018-09-21

## 2018-09-21 VITALS — BODY MASS INDEX: 35.14 KG/M2 | HEART RATE: 63 BPM | OXYGEN SATURATION: 98 % | WEIGHT: 251 LBS | HEIGHT: 71 IN

## 2018-09-21 DIAGNOSIS — M79.674 CHRONIC TOE PAIN, BILATERAL: ICD-10-CM

## 2018-09-21 DIAGNOSIS — M79.675 CHRONIC TOE PAIN, BILATERAL: ICD-10-CM

## 2018-09-21 DIAGNOSIS — F79 MENTAL RETARDATION: ICD-10-CM

## 2018-09-21 DIAGNOSIS — G89.29 CHRONIC TOE PAIN, BILATERAL: ICD-10-CM

## 2018-09-21 DIAGNOSIS — B35.1 ONYCHOMYCOSIS: Primary | ICD-10-CM

## 2018-09-21 PROCEDURE — 11721 DEBRIDE NAIL 6 OR MORE: CPT | Performed by: PODIATRIST

## 2018-09-21 NOTE — PROGRESS NOTES
Orlin De La Rosa Vaughn  1965  53 y.o. male     Patient presents today with a care giver for routine nail care.    9/21/2018  Chief Complaint   Patient presents with   • Left Foot - nail care   • Right Foot - nail care           History of Present Illness    Patient presents to clinic today for routine footcare.  He is accompanied by his caregiver.  Patient relates to painful, elongated and discolored toenails.  Pain is relieved debridement.           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 35-49) 01/21/2016   • Onychomycosis          Past Surgical History:   Procedure Laterality Date   • COLONOSCOPY     • COLONOSCOPY N/A 2/16/2018    Procedure: COLONOSCOPY;  Surgeon: Jus Aguilar MD;  Location: [facility] ENDOSCOPY;  Service:    • ENDOSCOPY N/A 10/27/2017    Procedure: ESOPHAGOGASTRODUODENOSCOPY;  Surgeon: Jus Aguilar MD;  Location: [facility] ENDOSCOPY;  Service:    • UPPER GASTROINTESTINAL ENDOSCOPY     • UPPER GASTROINTESTINAL ENDOSCOPY  10/27/2017         Family History   Problem Relation Age of Onset   • Heart disease Mother    • Diabetes Mother    • Heart disease Father    • Heart disease Sister    • No Known Problems Brother    • No Known Problems Maternal Grandmother    • No Known Problems Maternal Grandfather    • No Known Problems Paternal Grandmother    • No Known Problems Paternal Grandfather    • No Known Problems Sister    • No Known Problems Sister          Social History     Social History   • Marital status: Single     Spouse name: N/A   • Number of children: N/A   • Years of education: N/A     Occupational History   • Not  "on file.     Social History Main Topics   • Smoking status: Never Smoker   • Smokeless tobacco: Never Used   • Alcohol use No   • Drug use: No   • Sexual activity: Defer     Other Topics Concern   • Not on file     Social History Narrative   • No narrative on file         Current Outpatient Prescriptions   Medication Sig Dispense Refill   • acetaminophen (TYLENOL) 325 MG tablet Take 2 tablets by mouth Every 4 (Four) Hours As Needed for Mild Pain . 30 tablet 11   • busPIRone (BUSPAR) 15 MG tablet Take 15 mg by mouth 3 (Three) Times a Day.     • chlorhexidine (PERIDEX) 0.12 % solution Apply 15 mL to the mouth or throat 2 (Two) Times a Day.     • citalopram (CeleXA) 20 MG tablet Take 20 mg by mouth Daily.     • Dextromethorphan-Guaifenesin 5-100 MG/5ML liquid Take 1 teaspoon(s) by mouth 4 (Four) Times a Day As Needed (for cough). 118 mL 11   • esomeprazole (nexIUM) 40 MG capsule Take one by mouth daily at 7 am 30 capsule 11   • ferrous sulfate 325 (65 FE) MG tablet Take 1 tablet by mouth 2 (Two) Times a Day With Meals. 60 tablet 5   • hydrocortisone 1 % ointment Apply to rash or scrapes 56 g 11   • loperamide (IMODIUM) 2 MG capsule Take 1 capsule by mouth 4 (Four) Times a Day As Needed for Diarrhea. Take 1 cap after each loose stool not to exceed 8 per day prn 30 capsule 12   • magnesium hydroxide (MILK OF MAGNESIA) 400 MG/5ML suspension Take  by mouth. 2 tablespoons per day prn     • neomycin-polymyxin-pramoxine (ANTIBIOTIC PLUS PAIN RELIEF) 1 % cream Apply  topically to the appropriate area as directed 4 (Four) Times a Day As Needed (prn rash or scraps). 28 g 11   • tamsulosin (FLOMAX) 0.4 MG capsule 24 hr capsule Take one capsule by mouth at 7 am daily 30 capsule 11   • thioridazine (MELLARIL) 25 MG tablet 1 tab at 7 AM, 1 tab at 12 pm and 2 tabs in 7 PM every day 210 tablet 11     No current facility-administered medications for this visit.          OBJECTIVE    Pulse 63   Ht 180.3 cm (71\")   Wt 114 kg (251 lb)  "  SpO2 98%   BMI 35.01 kg/m²       Review of Systems   Constitutional: Negative for chills and fever.   Cardiovascular: Negative for chest pain.   Gastrointestinal: Negative for constipation, diarrhea, nausea and vomiting.   Skin: Negative for wound. long painful toenails      Constitutional: well developed, well nourished    HEENT: Normocephalic and atraumatic, normal hearing    Respiratory: Non labored respirations noted    Cardiovascular:    DP/PT pulses palpable    CFT brisk  to all digits  No erythema or edema noted     Musculoskeletal:  Muscle strength is 5/5 for all muscle groups tested   ROM of the 1st MTP is full without pain or crepitus  ROM of the MTJ is full without pain or crepitus    ROM of the STJ is full without pain or crepitus    ROM of the ankle joint is full without pain or crepitus        Dermatological:   Nails 2-5 are are discolored and elongated. Bilateral hallux nails are thickened, discolored, elongated with subungual hematoma and debris.  there is pain on palpation to the toenails  Skin is warm, dry and intact    Webspaces 1-4 bilateral are clean, dry and intact.   No subcutaneous nodules or masses noted    Hyperkeratotic lesion noted to the plantar aspect of the left fifth metatarsal head.   hyperkeratotic lesion noted to the plantar aspect of the right foot sub-fourth metatarsal head.      Neurological:   Protective sensation intact    Sensation intact to light touch    DTR intact    Psychiatric: A&O x 3 with normal mood and affect. NAD.         Procedures        ASSESSMENT AND PLAN    Orlin was seen today for nail care and nail care.    Diagnoses and all orders for this visit:    Onychomycosis    Chronic toe pain, bilateral    Mental retardation      - Nails 1-5 bilateral were debrided in length and thickness with nail nipper and electric  to decrease fungal load and risk of infection.  - All questions were answered   - RTC 3 months as needed           This document has been  electronically signed by Brian Cash DPM on September 21, 2018 11:36 AM     9/21/2018  11:36 AM

## 2018-09-25 RX ORDER — FERROUS SULFATE 325(65) MG
325 TABLET ORAL 2 TIMES DAILY WITH MEALS
Qty: 60 TABLET | Refills: 1 | Status: SHIPPED | OUTPATIENT
Start: 2018-09-25 | End: 2018-10-03 | Stop reason: SDUPTHER

## 2018-10-03 RX ORDER — FERROUS SULFATE 325(65) MG
325 TABLET ORAL 2 TIMES DAILY WITH MEALS
Qty: 60 TABLET | Refills: 1 | Status: SHIPPED | OUTPATIENT
Start: 2018-10-03 | End: 2018-11-06 | Stop reason: SDUPTHER

## 2018-10-30 ENCOUNTER — LAB (OUTPATIENT)
Dept: LAB | Facility: HOSPITAL | Age: 53
End: 2018-10-30

## 2018-10-30 DIAGNOSIS — D50.0 IRON DEFICIENCY ANEMIA DUE TO CHRONIC BLOOD LOSS: ICD-10-CM

## 2018-10-30 DIAGNOSIS — K57.30 DIVERTICULOSIS OF LARGE INTESTINE WITHOUT HEMORRHAGE: ICD-10-CM

## 2018-10-30 DIAGNOSIS — R10.84 GENERALIZED ABDOMINAL PAIN: ICD-10-CM

## 2018-10-30 LAB
BASOPHILS # BLD AUTO: 0.01 10*3/MM3 (ref 0–0.2)
BASOPHILS NFR BLD AUTO: 0.2 % (ref 0–2)
DEPRECATED RDW RBC AUTO: 39.4 FL (ref 35.1–43.9)
EOSINOPHIL # BLD AUTO: 0.11 10*3/MM3 (ref 0–0.7)
EOSINOPHIL NFR BLD AUTO: 2.6 % (ref 0–7)
ERYTHROCYTE [DISTWIDTH] IN BLOOD BY AUTOMATED COUNT: 12.2 % (ref 11.5–14.5)
HCT VFR BLD AUTO: 40.2 % (ref 39–49)
HGB BLD-MCNC: 13.7 G/DL (ref 13.7–17.3)
IMM GRANULOCYTES # BLD: 0.01 10*3/MM3 (ref 0–0.02)
IMM GRANULOCYTES NFR BLD: 0.2 % (ref 0–0.5)
LYMPHOCYTES # BLD AUTO: 1.73 10*3/MM3 (ref 0.6–4.2)
LYMPHOCYTES NFR BLD AUTO: 41 % (ref 10–50)
MCH RBC QN AUTO: 30.4 PG (ref 26.5–34)
MCHC RBC AUTO-ENTMCNC: 34.1 G/DL (ref 31.5–36.3)
MCV RBC AUTO: 89.3 FL (ref 80–98)
MONOCYTES # BLD AUTO: 0.37 10*3/MM3 (ref 0–0.9)
MONOCYTES NFR BLD AUTO: 8.8 % (ref 0–12)
NEUTROPHILS # BLD AUTO: 1.99 10*3/MM3 (ref 2–8.6)
NEUTROPHILS NFR BLD AUTO: 47.2 % (ref 37–80)
PLATELET # BLD AUTO: 153 10*3/MM3 (ref 150–450)
PMV BLD AUTO: 11 FL (ref 8–12)
RBC # BLD AUTO: 4.5 10*6/MM3 (ref 4.37–5.74)
WBC NRBC COR # BLD: 4.22 10*3/MM3 (ref 3.2–9.8)

## 2018-10-30 PROCEDURE — 83540 ASSAY OF IRON: CPT

## 2018-10-30 PROCEDURE — 82728 ASSAY OF FERRITIN: CPT

## 2018-10-30 PROCEDURE — 85025 COMPLETE CBC W/AUTO DIFF WBC: CPT

## 2018-10-30 PROCEDURE — 83550 IRON BINDING TEST: CPT

## 2018-10-31 LAB
FERRITIN SERPL-MCNC: 91.1 NG/ML (ref 17.9–464)
IRON 24H UR-MRATE: 55 MCG/DL (ref 49–181)
IRON SATN MFR SERPL: 15 % (ref 20–55)
TIBC SERPL-MCNC: 371 MCG/DL (ref 261–462)

## 2018-11-06 ENCOUNTER — OFFICE VISIT (OUTPATIENT)
Dept: GASTROENTEROLOGY | Facility: CLINIC | Age: 53
End: 2018-11-06

## 2018-11-06 VITALS
DIASTOLIC BLOOD PRESSURE: 76 MMHG | HEART RATE: 97 BPM | SYSTOLIC BLOOD PRESSURE: 122 MMHG | HEIGHT: 71 IN | BODY MASS INDEX: 35 KG/M2 | WEIGHT: 250 LBS

## 2018-11-06 DIAGNOSIS — R10.84 GENERALIZED ABDOMINAL PAIN: ICD-10-CM

## 2018-11-06 DIAGNOSIS — K21.00 GASTROESOPHAGEAL REFLUX DISEASE WITH ESOPHAGITIS: ICD-10-CM

## 2018-11-06 DIAGNOSIS — D50.0 IRON DEFICIENCY ANEMIA DUE TO CHRONIC BLOOD LOSS: Primary | ICD-10-CM

## 2018-11-06 DIAGNOSIS — K57.30 DIVERTICULOSIS OF LARGE INTESTINE WITHOUT HEMORRHAGE: ICD-10-CM

## 2018-11-06 PROCEDURE — 99213 OFFICE O/P EST LOW 20 MIN: CPT | Performed by: PHYSICIAN ASSISTANT

## 2018-11-06 RX ORDER — FERROUS SULFATE 325(65) MG
325 TABLET ORAL 2 TIMES DAILY WITH MEALS
Qty: 60 TABLET | Refills: 5 | Status: SHIPPED | OUTPATIENT
Start: 2018-11-06 | End: 2019-04-30 | Stop reason: SDUPTHER

## 2018-11-06 NOTE — PATIENT INSTRUCTIONS

## 2018-11-06 NOTE — PROGRESS NOTES
Chief Complaint   Patient presents with   • Anemia   • Heartburn   • Abdominal Pain   • Diverticulosis       ENDO PROCEDURE ORDERED:    Subjective    Orlin Mendes is a 53 y.o. male. he is here today for follow-up.    History of Present Illness    Patient seen on a recheck of his GERD, anemia, abdominal pain, diverticulosis. Last seen 07/31/2018. Patient is again accompanied by an aid. Denies any significant abdominal pain. GERD appears to be well controlled on the Nexium. Denied nausea, vomiting, dysphagia. Bowel movements are regular without blood or mucus. Weight is stable. His guardian states he is eating a lot and she does not understand why he has not gained weight. Last colonoscopy showed diverticulosis on 02/16/2018.     Laboratory on 10/30/2018, ferritin was 91.1, serum iron 55, saturation 15%. CBC was normal with hemoglobin 13.7. Stools for occult negative x3 on 08/14/2018.     ASSESSMENT/PLAN:  Patient’s anemia is much improved. I suggested he continue on the iron supplementation for now. GERD appears well controlled on the Nexium. Encouraged high-fiber diet. I refilled his iron. As long as he is doing well we will plan followup in 6 months with CBC, CMP, iron studies prior. He may follow up with [facility] Pardo in the interim.       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 35-49) 01/21/2016   • Onychomycosis      Past Surgical History:   Procedure Laterality Date   • COLONOSCOPY     • COLONOSCOPY N/A 2/16/2018    Procedure:  COLONOSCOPY;  Surgeon: Jus Aguilar MD;  Location: [facility] ENDOSCOPY;  Service:    • ENDOSCOPY N/A 10/27/2017    Procedure: ESOPHAGOGASTRODUODENOSCOPY;  Surgeon: Jus Aguilar MD;  Location: [facility] ENDOSCOPY;  Service:    • UPPER GASTROINTESTINAL ENDOSCOPY     • UPPER GASTROINTESTINAL ENDOSCOPY  10/27/2017     Family History   Problem Relation Age of Onset   • Heart disease Mother    • Diabetes Mother    • Heart disease Father    • Heart disease Sister    • No Known Problems Brother    • No Known Problems Maternal Grandmother    • No Known Problems Maternal Grandfather    • No Known Problems Paternal Grandmother    • No Known Problems Paternal Grandfather    • No Known Problems Sister    • No Known Problems Sister        No Known Allergies  Social History     Social History   • Marital status: Single     Social History Main Topics   • Smoking status: Never Smoker   • Smokeless tobacco: Never Used   • Alcohol use No   • Drug use: No   • Sexual activity: Defer     Other Topics Concern   • Not on file       Current Outpatient Prescriptions:   •  acetaminophen (TYLENOL) 325 MG tablet, Take 2 tablets by mouth Every 4 (Four) Hours As Needed for Mild Pain ., Disp: 30 tablet, Rfl: 11  •  busPIRone (BUSPAR) 15 MG tablet, Take 15 mg by mouth 3 (Three) Times a Day., Disp: , Rfl:   •  chlorhexidine (PERIDEX) 0.12 % solution, Apply 15 mL to the mouth or throat 2 (Two) Times a Day., Disp: , Rfl:   •  citalopram (CeleXA) 20 MG tablet, Take 20 mg by mouth Daily., Disp: , Rfl:   •  esomeprazole (nexIUM) 40 MG capsule, Take one by mouth daily at 7 am, Disp: 30 capsule, Rfl: 11  •  ferrous sulfate 325 (65 FE) MG tablet, Take 1 tablet by mouth 2 (Two) Times a Day With Meals., Disp: 60 tablet, Rfl: 5  •  hydrocortisone 1 % ointment, Apply to rash or scrapes, Disp: 56 g, Rfl: 11  •  loperamide (IMODIUM) 2 MG capsule, Take 1 capsule by mouth 4 (Four) Times a Day As Needed for Diarrhea. Take 1 cap after each loose stool not to exceed 8  "per day prn, Disp: 30 capsule, Rfl: 12  •  neomycin-polymyxin-pramoxine (ANTIBIOTIC PLUS PAIN RELIEF) 1 % cream, Apply  topically to the appropriate area as directed 4 (Four) Times a Day As Needed (prn rash or scraps)., Disp: 28 g, Rfl: 11  •  tamsulosin (FLOMAX) 0.4 MG capsule 24 hr capsule, Take one capsule by mouth at 7 am daily, Disp: 30 capsule, Rfl: 11  •  thioridazine (MELLARIL) 25 MG tablet, 1 tab at 7 AM, 1 tab at 12 pm and 2 tabs in 7 PM every day, Disp: 210 tablet, Rfl: 11  •  Dextromethorphan-Guaifenesin 5-100 MG/5ML liquid, Take 1 teaspoon(s) by mouth 4 (Four) Times a Day As Needed (for cough)., Disp: 118 mL, Rfl: 11  Review of Systems  Review of Systems       Objective    /76 (BP Location: Left arm, Patient Position: Sitting)   Pulse 97   Ht 180.3 cm (71\")   Wt 113 kg (250 lb)   BMI 34.87 kg/m²   Physical Exam   Constitutional: He is oriented to person, place, and time. He appears well-developed and well-nourished. No distress.   HENT:   Head: Normocephalic and atraumatic.   Eyes: Pupils are equal, round, and reactive to light. EOM are normal.   Neck: Normal range of motion.   Cardiovascular: Normal rate, regular rhythm and normal heart sounds.    Pulmonary/Chest: Effort normal and breath sounds normal.   Abdominal: Soft. Bowel sounds are normal. He exhibits no shifting dullness, no distension, no abdominal bruit, no ascites and no mass. There is no hepatosplenomegaly. There is tenderness. There is no rigidity, no rebound, no guarding and no CVA tenderness. No hernia. Hernia confirmed negative in the ventral area.   Musculoskeletal: Normal range of motion.   Neurological: He is alert and oriented to person, place, and time.   Skin: Skin is warm and dry.   Psychiatric: He has a normal mood and affect. His behavior is normal. Judgment and thought content normal.   Nursing note and vitals reviewed.    Assessment/Plan      1. Iron deficiency anemia due to chronic blood loss    2. Gastroesophageal " reflux disease with esophagitis    3. Generalized abdominal pain    4. Diverticulosis of large intestine without hemorrhage    .   Orlin was seen today for anemia, heartburn, abdominal pain and diverticulosis.    Diagnoses and all orders for this visit:    Iron deficiency anemia due to chronic blood loss  -     CBC Auto Differential; Future  -     Comprehensive Metabolic Panel; Future  -     Iron and TIBC; Future  -     Ferritin; Future    Gastroesophageal reflux disease with esophagitis    Generalized abdominal pain  -     CBC Auto Differential; Future  -     Comprehensive Metabolic Panel; Future  -     Iron and TIBC; Future  -     Ferritin; Future    Diverticulosis of large intestine without hemorrhage    Other orders  -     ferrous sulfate 325 (65 FE) MG tablet; Take 1 tablet by mouth 2 (Two) Times a Day With Meals.        Orders placed during this encounter include:  Orders Placed This Encounter   Procedures   • CBC Auto Differential     Due prior to follow up in May     Standing Status:   Future     Standing Expiration Date:   5/31/2019   • Comprehensive Metabolic Panel     Due prior to follow up in May     Standing Status:   Future     Standing Expiration Date:   5/31/2019   • Iron and TIBC     Due prior to follow up in May     Standing Status:   Future     Standing Expiration Date:   5/31/2019   • Ferritin     Due prior to follow up in May     Standing Status:   Future     Standing Expiration Date:   5/31/2019       Medications prescribed:  New Medications Ordered This Visit   Medications   • ferrous sulfate 325 (65 FE) MG tablet     Sig: Take 1 tablet by mouth 2 (Two) Times a Day With Meals.     Dispense:  60 tablet     Refill:  5     Discontinued Medications       Reason for Discontinue    magnesium hydroxide (MILK OF MAGNESIA) 400 MG/5ML suspension *Therapy completed        Requested Prescriptions     Signed Prescriptions Disp Refills   • ferrous sulfate 325 (65 FE) MG tablet 60 tablet 5     Sig: Take 1  tablet by mouth 2 (Two) Times a Day With Meals.       Review and/or summary of lab tests, radiology, procedures, medications. Review and summary of old records and obtaining of history. The risks and benefits of my recommendations, as well as other treatment options were discussed with the patient today. Questions were answered.    Follow-up: Return in about 6 months (around 5/6/2019), or if symptoms worsen or fail to improve, for lab prior.     * Surgery not found *      This document has been electronically signed by Bhargav Foreman PA-C on November 9, 2018 6:32 PM      Results for orders placed or performed in visit on 10/30/18   CBC Auto Differential   Result Value Ref Range    WBC 4.22 3.20 - 9.80 10*3/mm3    RBC 4.50 4.37 - 5.74 10*6/mm3    Hemoglobin 13.7 13.7 - 17.3 g/dL    Hematocrit 40.2 39.0 - 49.0 %    MCV 89.3 80.0 - 98.0 fL    MCH 30.4 26.5 - 34.0 pg    MCHC 34.1 31.5 - 36.3 g/dL    RDW 12.2 11.5 - 14.5 %    RDW-SD 39.4 35.1 - 43.9 fl    MPV 11.0 8.0 - 12.0 fL    Platelets 153 150 - 450 10*3/mm3    Neutrophil % 47.2 37.0 - 80.0 %    Lymphocyte % 41.0 10.0 - 50.0 %    Monocyte % 8.8 0.0 - 12.0 %    Eosinophil % 2.6 0.0 - 7.0 %    Basophil % 0.2 0.0 - 2.0 %    Immature Grans % 0.2 0.0 - 0.5 %    Neutrophils, Absolute 1.99 (L) 2.00 - 8.60 10*3/mm3    Lymphocytes, Absolute 1.73 0.60 - 4.20 10*3/mm3    Monocytes, Absolute 0.37 0.00 - 0.90 10*3/mm3    Eosinophils, Absolute 0.11 0.00 - 0.70 10*3/mm3    Basophils, Absolute 0.01 0.00 - 0.20 10*3/mm3    Immature Grans, Absolute 0.01 0.00 - 0.02 10*3/mm3   Iron and TIBC   Result Value Ref Range    Iron 55 49 - 181 mcg/dL    TIBC 371 261 - 462 mcg/dL    Iron Saturation 15 (L) 20 - 55 %   Ferritin   Result Value Ref Range    Ferritin 91.10 17.90 - 464.00 ng/mL   Results for orders placed or performed in visit on 07/23/18   CBC Auto Differential   Result Value Ref Range    WBC 4.35 3.20 - 9.80 10*3/mm3    RBC 4.20 (L) 4.37 - 5.74 10*6/mm3    Hemoglobin 12.7 (L)  13.7 - 17.3 g/dL    Hematocrit 37.3 (L) 39.0 - 49.0 %    MCV 88.8 80.0 - 98.0 fL    MCH 30.2 26.5 - 34.0 pg    MCHC 34.0 31.5 - 36.3 g/dL    RDW 12.3 11.5 - 14.5 %    RDW-SD 39.7 35.1 - 43.9 fl    MPV 11.4 8.0 - 12.0 fL    Platelets 153 150 - 450 10*3/mm3    Neutrophil % 43.0 37.0 - 80.0 %    Lymphocyte % 43.4 10.0 - 50.0 %    Monocyte % 8.0 0.0 - 12.0 %    Eosinophil % 5.1 0.0 - 7.0 %    Basophil % 0.5 0.0 - 2.0 %    Immature Grans % 0.0 0.0 - 0.5 %    Neutrophils, Absolute 1.87 (L) 2.00 - 8.60 10*3/mm3    Lymphocytes, Absolute 1.89 0.60 - 4.20 10*3/mm3    Monocytes, Absolute 0.35 0.00 - 0.90 10*3/mm3    Eosinophils, Absolute 0.22 0.00 - 0.70 10*3/mm3    Basophils, Absolute 0.02 0.00 - 0.20 10*3/mm3    Immature Grans, Absolute 0.00 0.00 - 0.02 10*3/mm3   Iron and TIBC   Result Value Ref Range    Iron 72 49 - 181 mcg/dL    TIBC 343 261 - 462 mcg/dL    Iron Saturation 21 20 - 55 %   Ferritin   Result Value Ref Range    Ferritin 77.40 17.90 - 464.00 ng/mL   Results for orders placed or performed in visit on 03/22/18   Comprehensive metabolic panel   Result Value Ref Range    Glucose 90 60 - 100 mg/dL    BUN 13 7 - 21 mg/dL    Creatinine 0.90 0.70 - 1.30 mg/dL    Sodium 143 137 - 145 mmol/L    Potassium 4.1 3.5 - 5.1 mmol/L    Chloride 102 95 - 110 mmol/L    CO2 27.0 22.0 - 31.0 mmol/L    Calcium 9.1 8.4 - 10.2 mg/dL    Total Protein 6.7 6.3 - 8.6 g/dL    Albumin 4.00 3.40 - 4.80 g/dL    ALT (SGPT) 28 21 - 72 U/L    AST (SGOT) 20 17 - 59 U/L    Alkaline Phosphatase 53 38 - 126 U/L    Total Bilirubin 0.2 0.2 - 1.3 mg/dL    eGFR Non  Amer 89 56 - 130 mL/min/1.73    Globulin 2.7 2.3 - 3.5 gm/dL    A/G Ratio 1.5 1.1 - 1.8 g/dL    BUN/Creatinine Ratio 14.4 7.0 - 25.0    Anion Gap 14.0 5.0 - 15.0 mmol/L   Results for orders placed or performed in visit on 02/27/18   Iron and TIBC   Result Value Ref Range    Iron 85 49 - 181 mcg/dL    TIBC 364 261 - 462 mcg/dL    Iron Saturation 23 20 - 55 %   Hemoglobin & Hematocrit,  Blood   Result Value Ref Range    Hemoglobin 13.3 (L) 13.7 - 17.3 g/dL    Hematocrit 38.2 (L) 39.0 - 49.0 %   Ferritin   Result Value Ref Range    Ferritin 44.80 17.90 - 464.00 ng/mL   Results for orders placed or performed in visit on 02/20/18   Iron and TIBC   Result Value Ref Range    Iron 76 49 - 181 mcg/dL    TIBC 361 261 - 462 mcg/dL    Iron Saturation 21 20 - 55 %   Hemoglobin & Hematocrit, Blood   Result Value Ref Range    Hemoglobin 13.1 (L) 13.7 - 17.3 g/dL    Hematocrit 38.6 (L) 39.0 - 49.0 %   Ferritin   Result Value Ref Range    Ferritin 36.80 17.90 - 464.00 ng/mL   Results for orders placed or performed in visit on 12/29/17   POC Influenza A / B   Result Value Ref Range    Rapid Influenza A Ag NEG     Rapid Influenza B Ag NEG     Internal Control Passed Passed    Lot Number [id number]     Expiration Date 02/20/2020    Results for orders placed or performed in visit on 11/07/17   Iron and TIBC   Result Value Ref Range    Iron 31 (L) 49 - 181 mcg/dL    TIBC 375 261 - 462 mcg/dL    Iron Saturation 8 (L) 20 - 55 %   Hemoglobin & Hematocrit, Blood   Result Value Ref Range    Hemoglobin 13.1 (L) 13.7 - 17.3 g/dL    Hematocrit 38.4 (L) 39.0 - 49.0 %   Ferritin   Result Value Ref Range    Ferritin 26.70 17.90 - 464.00 ng/mL   Results for orders placed or performed during the hospital encounter of 10/27/17   Tissue Pathology Exam - Tissue, Gastric, Antrum   Result Value Ref Range    Case Report       Surgical Pathology Report                         Case: [id number]                                  Authorizing Provider:  Jus Aguilar MD         Collected:           10/27/2017 04:52 PM          Ordering Location:     [facility]             Received:            10/30/2017 07:48 AM                                 [place]                                                     Pathologist:           Liam Evangelista MD                                                          Specimens:   1) -  "Gastric, Antrum, antrum bx                                                                     2) - Esophagus, Distal, distal esophagus bx                                                Final Diagnosis       1.  MUCOSA, ANTRUM OF STOMACH:  REACTIVE GASTROPATHY.    2.  MUCOSA, DISTAL ESOPHAGUS:  REACTIVE CHANGE OF SQUAMOUS MUCOSA AND CARDIAC GASTRIC MUCOSA.  NEGATIVE FOR DYSPLASIA AND GOBLET CELL METAPLASIA (ALCIAN BLUE/PAS STAIN).      Gross Description       1.  The first container is labeled \"antrum of stomach\" and has nodular bits of white soft tissue measuring 0.4 cc in aggregate.  The entire specimen is embedded as 1A.     2.  The second container is labeled \"distal esophagus\" and has a nodular fragment of white soft tissue measuring 0.2 cm in greatest dimension.  The entire specimen is embedded as 2A.       Embedded Images       *Note: Due to a large number of results and/or encounters for the requested time period, some results have not been displayed. A complete set of results can be found in Results Review.       Some portions of this note have been dictated using voice recognition software and may contain errors and/or omissions.   "

## 2019-01-04 ENCOUNTER — OFFICE VISIT (OUTPATIENT)
Dept: PODIATRY | Facility: CLINIC | Age: 54
End: 2019-01-04

## 2019-01-04 VITALS — HEIGHT: 71 IN | HEART RATE: 84 BPM | OXYGEN SATURATION: 99 % | WEIGHT: 250 LBS | BODY MASS INDEX: 35 KG/M2

## 2019-01-04 DIAGNOSIS — M79.674 CHRONIC TOE PAIN, BILATERAL: ICD-10-CM

## 2019-01-04 DIAGNOSIS — G89.29 CHRONIC TOE PAIN, BILATERAL: ICD-10-CM

## 2019-01-04 DIAGNOSIS — M79.675 CHRONIC TOE PAIN, BILATERAL: ICD-10-CM

## 2019-01-04 DIAGNOSIS — F79 MENTAL RETARDATION: ICD-10-CM

## 2019-01-04 DIAGNOSIS — B35.1 ONYCHOMYCOSIS: Primary | ICD-10-CM

## 2019-01-04 PROCEDURE — 11721 DEBRIDE NAIL 6 OR MORE: CPT | Performed by: PODIATRIST

## 2019-01-04 NOTE — PROGRESS NOTES
Orlin De La Rosa Vaughn  1965  53 y.o. male     Patient presents today with a care giver for routine nail care.     1/4/2019  Chief Complaint   Patient presents with   • Left Foot - nail care   • Right Foot - nail care       History of Present Illness    Patient presents to clinic today for routine footcare.  He is accompanied by his caregiver.  Patient relates to painful, elongated and discolored toenails.  Pain is relieved debridement.      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 35-49) 01/21/2016   • Onychomycosis          Past Surgical History:   Procedure Laterality Date   • COLONOSCOPY     • COLONOSCOPY N/A 2/16/2018    Procedure: COLONOSCOPY;  Surgeon: Jus Aguilar MD;  Location: [facility] ENDOSCOPY;  Service:    • ENDOSCOPY N/A 10/27/2017    Procedure: ESOPHAGOGASTRODUODENOSCOPY;  Surgeon: Jus Aguilar MD;  Location: [facility] ENDOSCOPY;  Service:    • UPPER GASTROINTESTINAL ENDOSCOPY     • UPPER GASTROINTESTINAL ENDOSCOPY  10/27/2017         Family History   Problem Relation Age of Onset   • Heart disease Mother    • Diabetes Mother    • Heart disease Father    • Heart disease Sister    • No Known Problems Brother    • No Known Problems Maternal Grandmother    • No Known Problems Maternal Grandfather    • No Known Problems Paternal Grandmother    • No Known Problems Paternal Grandfather    • No Known Problems Sister    • No Known Problems Sister          Social History     Socioeconomic History   • Marital status: Single     Spouse name: Not on file   • Number of children: Not on file   • Years of education: Not on file   • Highest  education level: Not on file   Social Needs   • Financial resource strain: Not on file   • Food insecurity - worry: Not on file   • Food insecurity - inability: Not on file   • Transportation needs - medical: Not on file   • Transportation needs - non-medical: Not on file   Occupational History   • Not on file   Tobacco Use   • Smoking status: Never Smoker   • Smokeless tobacco: Never Used   Substance and Sexual Activity   • Alcohol use: No   • Drug use: No   • Sexual activity: Defer   Other Topics Concern   • Not on file   Social History Narrative   • Not on file         Current Outpatient Medications   Medication Sig Dispense Refill   • acetaminophen (TYLENOL) 325 MG tablet Take 2 tablets by mouth Every 4 (Four) Hours As Needed for Mild Pain . 30 tablet 11   • busPIRone (BUSPAR) 15 MG tablet Take 15 mg by mouth 3 (Three) Times a Day.     • chlorhexidine (PERIDEX) 0.12 % solution Apply 15 mL to the mouth or throat 2 (Two) Times a Day.     • citalopram (CeleXA) 20 MG tablet Take 20 mg by mouth Daily.     • Dextromethorphan-Guaifenesin 5-100 MG/5ML liquid Take 1 teaspoon(s) by mouth 4 (Four) Times a Day As Needed (for cough). 118 mL 11   • esomeprazole (nexIUM) 40 MG capsule Take one by mouth daily at 7 am 30 capsule 11   • ferrous sulfate 325 (65 FE) MG tablet Take 1 tablet by mouth 2 (Two) Times a Day With Meals. 60 tablet 5   • hydrocortisone 1 % ointment Apply to rash or scrapes 56 g 11   • loperamide (IMODIUM) 2 MG capsule Take 1 capsule by mouth 4 (Four) Times a Day As Needed for Diarrhea. Take 1 cap after each loose stool not to exceed 8 per day prn 30 capsule 12   • neomycin-polymyxin-pramoxine (ANTIBIOTIC PLUS PAIN RELIEF) 1 % cream Apply  topically to the appropriate area as directed 4 (Four) Times a Day As Needed (prn rash or scraps). 28 g 11   • tamsulosin (FLOMAX) 0.4 MG capsule 24 hr capsule Take one capsule by mouth at 7 am daily 30 capsule 11   • thioridazine (MELLARIL) 25 MG tablet 1 tab at 7 AM, 1  "tab at 12 pm and 2 tabs in 7 PM every day 210 tablet 11     No current facility-administered medications for this visit.      Review of Systems   Unable to perform ROS: Psychiatric disorder        OBJECTIVE    Pulse 84   Ht 180.3 cm (71\")   Wt 113 kg (250 lb)   SpO2 99%   BMI 34.87 kg/m²       Physical Exam   Constitutional: He appears well-developed and well-nourished. No distress.   HENT:   Head: Normocephalic and atraumatic.   Nose: Nose normal.   Eyes: Conjunctivae and EOM are normal. Pupils are equal, round, and reactive to light.   Pulmonary/Chest: Effort normal. No respiratory distress. He has no wheezes.   Neurological: He is alert.   Skin: Skin is warm and dry. Capillary refill takes less than 2 seconds.   Vitals reviewed.    Lower Extremity:     Cardiovascular:    DP/PT pulses palpable    CFT brisk  to all digits  No erythema or edema noted     Musculoskeletal:  Muscle strength is 5/5 for all muscle groups tested   ROM of the 1st MTP is full without pain or crepitus  ROM of the MTJ is full without pain or crepitus    ROM of the STJ is full without pain or crepitus    ROM of the ankle joint is full without pain or crepitus      Dermatological:   Nails 2-5 are are discolored and elongated. Bilateral hallux nails are thickened, discolored, elongated with subungual hematoma and debris.  there is pain on palpation to the toenails  Skin is warm, dry and intact    Webspaces 1-4 bilateral are clean, dry and intact.   No subcutaneous nodules or masses noted    Hyperkeratotic lesion noted to the plantar aspect of the left fifth metatarsal head.   hyperkeratotic lesion noted to the plantar aspect of the right foot sub-fourth metatarsal head.      Neurological:   Protective sensation intact    Sensation intact to light touch        Procedures        ASSESSMENT AND PLAN    Orlin was seen today for nail care and nail care.    Diagnoses and all orders for this visit:    Onychomycosis    Chronic toe pain, " bilateral    Mental retardation      - Nails 1-5 bilateral were debrided in length and thickness with nail nipper and electric  to decrease fungal load and risk of infection.   - All questions were answered   - RTC 3 months as needed           This document has been electronically signed by Brian Cash DPM on January 4, 2019 12:27 PM     1/4/2019  12:27 PM

## 2019-01-16 DIAGNOSIS — F41.9 ANXIETY: ICD-10-CM

## 2019-01-17 RX ORDER — THIORIDAZINE HYDROCHLORIDE 25 MG/1
TABLET, FILM COATED ORAL
Qty: 210 TABLET | Refills: 11 | Status: SHIPPED | OUTPATIENT
Start: 2019-01-17 | End: 2020-01-13 | Stop reason: SDUPTHER

## 2019-02-21 ENCOUNTER — OFFICE VISIT (OUTPATIENT)
Dept: FAMILY MEDICINE CLINIC | Facility: CLINIC | Age: 54
End: 2019-02-21

## 2019-02-21 VITALS
WEIGHT: 246 LBS | SYSTOLIC BLOOD PRESSURE: 115 MMHG | TEMPERATURE: 98.1 F | HEIGHT: 71 IN | HEART RATE: 93 BPM | OXYGEN SATURATION: 97 % | RESPIRATION RATE: 18 BRPM | DIASTOLIC BLOOD PRESSURE: 73 MMHG | BODY MASS INDEX: 34.44 KG/M2

## 2019-02-21 DIAGNOSIS — F41.9 ANXIETY: ICD-10-CM

## 2019-02-21 DIAGNOSIS — F79 MENTAL RETARDATION: Primary | ICD-10-CM

## 2019-02-21 PROCEDURE — 99213 OFFICE O/P EST LOW 20 MIN: CPT | Performed by: NURSE PRACTITIONER

## 2019-02-21 NOTE — PROGRESS NOTES
Subjective   Orlin Mendes is a 53 y.o. male.     Here today for his 6 month check up.  He has a probable autism and mental retardation.  He is able to follow simple commands, but does not initiate any type of conversation.  He parrots almost everything said to him.  He needs no refills.      Anxiety   Presents for follow-up visit. Symptoms include irritability. Patient reports no chest pain, compulsions, decreased concentration, depressed mood, dizziness, dry mouth, excessive worry, feeling of choking, hyperventilation, impotence, insomnia, malaise, muscle tension, nausea, nervous/anxious behavior, obsessions, palpitations, panic, restlessness, shortness of breath or suicidal ideas. Symptoms occur constantly. The severity of symptoms is interfering with daily activities. The quality of sleep is good. Nighttime awakenings: occasional.     Compliance with medications is %.        The following portions of the patient's history were reviewed and updated as appropriate: allergies, current medications, past family history, past medical history, past social history, past surgical history and problem list.    Review of Systems   Constitutional: Positive for irritability.   HENT: Negative.    Respiratory: Negative.  Negative for shortness of breath.    Cardiovascular: Negative.  Negative for chest pain and palpitations.   Gastrointestinal: Negative for nausea.   Genitourinary: Negative for impotence.   Musculoskeletal: Negative.    Skin: Negative.    Neurological: Negative.  Negative for dizziness.   Psychiatric/Behavioral: Negative.  Negative for decreased concentration, suicidal ideas and depressed mood. The patient is not nervous/anxious and does not have insomnia.        Objective   Physical Exam   Constitutional: He is oriented to person, place, and time. He appears well-developed and well-nourished. No distress.   HENT:   Head: Normocephalic.   Eyes: EOM are normal. Pupils are equal, round, and reactive to  light.   Neck: Normal range of motion. Neck supple. No thyromegaly present.   Cardiovascular: Normal rate, regular rhythm and normal heart sounds. Exam reveals no friction rub.   No murmur heard.  Pulmonary/Chest: Effort normal and breath sounds normal. No stridor. No respiratory distress. He has no wheezes. He has no rales.   Abdominal: Soft.   Musculoskeletal: Normal range of motion.   Neurological: He is alert and oriented to person, place, and time.   Skin: Skin is warm and dry.   Psychiatric: He has a normal mood and affect. Thought content normal.   Nursing note and vitals reviewed.        Assessment/Plan   Orlin was seen today for x mental retardation.    Diagnoses and all orders for this visit:    Mental retardation    Anxiety

## 2019-04-12 ENCOUNTER — OFFICE VISIT (OUTPATIENT)
Dept: PODIATRY | Facility: CLINIC | Age: 54
End: 2019-04-12

## 2019-04-12 VITALS — BODY MASS INDEX: 34.44 KG/M2 | OXYGEN SATURATION: 98 % | HEIGHT: 71 IN | HEART RATE: 70 BPM | WEIGHT: 246 LBS

## 2019-04-12 DIAGNOSIS — F79 MENTAL RETARDATION: ICD-10-CM

## 2019-04-12 DIAGNOSIS — G89.29 CHRONIC TOE PAIN, BILATERAL: ICD-10-CM

## 2019-04-12 DIAGNOSIS — M79.675 CHRONIC TOE PAIN, BILATERAL: ICD-10-CM

## 2019-04-12 DIAGNOSIS — B35.1 ONYCHOMYCOSIS: Primary | ICD-10-CM

## 2019-04-12 DIAGNOSIS — M79.674 CHRONIC TOE PAIN, BILATERAL: ICD-10-CM

## 2019-04-12 PROCEDURE — 11721 DEBRIDE NAIL 6 OR MORE: CPT | Performed by: PODIATRIST

## 2019-04-12 NOTE — PROGRESS NOTES
Orlin De La Rosa Vaughn  1965  53 y.o. male     Patient presents today with a care giver for routine nail care.     4/12/2019  Chief Complaint   Patient presents with   • Left Foot - nail care   • Right Foot - nail care       History of Present Illness    Patient presents to clinic today for routine footcare.  He is accompanied by his caregiver.  Patient relates to painful, elongated and discolored toenails.  Pain is relieved debridement.      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 35-49) 01/21/2016   • Onychomycosis          Past Surgical History:   Procedure Laterality Date   • COLONOSCOPY     • COLONOSCOPY N/A 2/16/2018    Procedure: COLONOSCOPY;  Surgeon: Jus Aguilar MD;  Location: [facility] ENDOSCOPY;  Service:    • ENDOSCOPY N/A 10/27/2017    Procedure: ESOPHAGOGASTRODUODENOSCOPY;  Surgeon: Jus Aguilar MD;  Location: [facility] ENDOSCOPY;  Service:    • UPPER GASTROINTESTINAL ENDOSCOPY     • UPPER GASTROINTESTINAL ENDOSCOPY  10/27/2017         Family History   Problem Relation Age of Onset   • Heart disease Mother    • Diabetes Mother    • Heart disease Father    • Heart disease Sister    • No Known Problems Brother    • No Known Problems Maternal Grandmother    • No Known Problems Maternal Grandfather    • No Known Problems Paternal Grandmother    • No Known Problems Paternal Grandfather    • No Known Problems Sister    • No Known Problems Sister          Social History     Socioeconomic History   • Marital status: Single     Spouse name: Not on file   • Number of children: Not on file   • Years of education: Not on file   •  Highest education level: Not on file   Tobacco Use   • Smoking status: Never Smoker   • Smokeless tobacco: Never Used   Substance and Sexual Activity   • Alcohol use: No   • Drug use: No   • Sexual activity: Defer         Current Outpatient Medications   Medication Sig Dispense Refill   • acetaminophen (TYLENOL) 325 MG tablet Take 2 tablets by mouth Every 4 (Four) Hours As Needed for Mild Pain . 30 tablet 11   • busPIRone (BUSPAR) 15 MG tablet Take 15 mg by mouth 3 (Three) Times a Day.     • chlorhexidine (PERIDEX) 0.12 % solution Apply 15 mL to the mouth or throat 2 (Two) Times a Day.     • citalopram (CeleXA) 20 MG tablet Take 20 mg by mouth Daily.     • Dextromethorphan-Guaifenesin 5-100 MG/5ML liquid Take 1 teaspoon(s) by mouth 4 (Four) Times a Day As Needed (for cough). 118 mL 11   • esomeprazole (nexIUM) 40 MG capsule Take one by mouth daily at 7 am 30 capsule 11   • ferrous sulfate 325 (65 FE) MG tablet Take 1 tablet by mouth 2 (Two) Times a Day With Meals. 60 tablet 5   • hydrocortisone 1 % ointment Apply to rash or scrapes 56 g 11   • loperamide (IMODIUM) 2 MG capsule Take 1 capsule by mouth 4 (Four) Times a Day As Needed for Diarrhea. Take 1 cap after each loose stool not to exceed 8 per day prn 30 capsule 12   • neomycin-polymyxin-pramoxine (ANTIBIOTIC PLUS PAIN RELIEF) 1 % cream Apply  topically to the appropriate area as directed 4 (Four) Times a Day As Needed (prn rash or scraps). 28 g 11   • tamsulosin (FLOMAX) 0.4 MG capsule 24 hr capsule Take one capsule by mouth at 7 am daily 30 capsule 11   • thioridazine (MELLARIL) 25 MG tablet 1 tab at 7 AM, 1 tab at 12 pm and 2 tabs in 7 PM every day 210 tablet 11     No current facility-administered medications for this visit.      Review of Systems   Unable to perform ROS: Psychiatric disorder   Constitutional: Negative.    HENT: Negative.    Eyes: Negative.    Respiratory: Negative.    Cardiovascular: Negative.    Gastrointestinal: Negative.   "  Genitourinary: Negative.    Musculoskeletal:        Toenail pain   Skin: Negative.         OBJECTIVE    Pulse 70   Ht 180.3 cm (71\")   Wt 112 kg (246 lb)   SpO2 98%   BMI 34.31 kg/m²       Physical Exam   Constitutional: He appears well-developed and well-nourished. No distress.   HENT:   Head: Normocephalic and atraumatic.   Nose: Nose normal.   Eyes: Conjunctivae and EOM are normal. Pupils are equal, round, and reactive to light.   Pulmonary/Chest: Effort normal. No respiratory distress. He has no wheezes.   Neurological: He is alert.   Skin: Skin is warm and dry. Capillary refill takes less than 2 seconds.   Vitals reviewed.    Lower Extremity:     Cardiovascular:    DP/PT pulses palpable    CFT brisk  to all digits  No erythema or edema noted     Musculoskeletal:  Muscle strength is 5/5 for all muscle groups tested   ROM of the 1st MTP is full without pain or crepitus  ROM of the MTJ is full without pain or crepitus    ROM of the STJ is full without pain or crepitus    ROM of the ankle joint is full without pain or crepitus      Dermatological:   Nails 2-5 are are discolored and elongated. Bilateral hallux nails are thickened, discolored, elongated with subungual hematoma and debris.  there is pain on palpation to the toenails  Skin is warm, dry and intact    Webspaces 1-4 bilateral are clean, dry and intact.   No subcutaneous nodules or masses noted    Hyperkeratotic lesion noted to the plantar aspect of the left fifth metatarsal head.   hyperkeratotic lesion noted to the plantar aspect of the right foot sub-fourth metatarsal head.      Neurological:   Protective sensation intact    Sensation intact to light touch        Procedures        ASSESSMENT AND PLAN    Orlin was seen today for nail care and nail care.    Diagnoses and all orders for this visit:    Onychomycosis    Chronic toe pain, bilateral    Mental retardation      - Nails 1-5 bilateral were debrided in length and thickness with nail nipper " and electric  to decrease fungal load and risk of infection.   - All questions were answered   - RTC 3 months as needed           This document has been electronically signed by Brian Cash DPM on April 12, 2019 11:32 AM     4/12/2019  11:32 AM

## 2019-04-29 ENCOUNTER — LAB (OUTPATIENT)
Dept: LAB | Facility: HOSPITAL | Age: 54
End: 2019-04-29

## 2019-04-29 DIAGNOSIS — D50.0 IRON DEFICIENCY ANEMIA DUE TO CHRONIC BLOOD LOSS: ICD-10-CM

## 2019-04-29 DIAGNOSIS — R10.84 GENERALIZED ABDOMINAL PAIN: ICD-10-CM

## 2019-04-29 DIAGNOSIS — R59.0 CERVICAL LYMPHADENOPATHY: ICD-10-CM

## 2019-04-29 DIAGNOSIS — R50.9 LOW GRADE FEVER: ICD-10-CM

## 2019-04-29 PROCEDURE — 84466 ASSAY OF TRANSFERRIN: CPT

## 2019-04-29 PROCEDURE — 83540 ASSAY OF IRON: CPT

## 2019-04-29 PROCEDURE — 85025 COMPLETE CBC W/AUTO DIFF WBC: CPT

## 2019-04-29 PROCEDURE — 82728 ASSAY OF FERRITIN: CPT

## 2019-04-29 PROCEDURE — 80053 COMPREHEN METABOLIC PANEL: CPT

## 2019-04-30 RX ORDER — FERROUS SULFATE 325(65) MG
325 TABLET ORAL 2 TIMES DAILY WITH MEALS
Qty: 60 TABLET | Refills: 0 | Status: SHIPPED | OUTPATIENT
Start: 2019-04-30 | End: 2019-05-07 | Stop reason: SDUPTHER

## 2019-05-01 LAB
ALBUMIN SERPL-MCNC: 3.8 G/DL (ref 3.5–5.2)
ALBUMIN/GLOB SERPL: 1.2 G/DL
ALP SERPL-CCNC: 61 U/L (ref 39–117)
ALT SERPL W P-5'-P-CCNC: 17 U/L (ref 1–41)
ANION GAP SERPL CALCULATED.3IONS-SCNC: 11.3 MMOL/L
AST SERPL-CCNC: 18 U/L (ref 1–40)
BASOPHILS # BLD AUTO: 0.02 10*3/MM3 (ref 0–0.2)
BASOPHILS NFR BLD AUTO: 0.4 % (ref 0–1.5)
BILIRUB SERPL-MCNC: 0.3 MG/DL (ref 0.2–1.2)
BUN BLD-MCNC: 8 MG/DL (ref 6–20)
BUN/CREAT SERPL: 10.4 (ref 7–25)
CALCIUM SPEC-SCNC: 9.3 MG/DL (ref 8.6–10.5)
CHLORIDE SERPL-SCNC: 104 MMOL/L (ref 98–107)
CO2 SERPL-SCNC: 24.7 MMOL/L (ref 22–29)
CREAT BLD-MCNC: 0.77 MG/DL (ref 0.76–1.27)
DEPRECATED RDW RBC AUTO: 38.5 FL (ref 37–54)
EOSINOPHIL # BLD AUTO: 0.12 10*3/MM3 (ref 0–0.4)
EOSINOPHIL NFR BLD AUTO: 2.6 % (ref 0.3–6.2)
ERYTHROCYTE [DISTWIDTH] IN BLOOD BY AUTOMATED COUNT: 11.9 % (ref 12.3–15.4)
FERRITIN SERPL-MCNC: 180 NG/ML (ref 30–400)
GFR SERPL CREATININE-BSD FRML MDRD: 106 ML/MIN/1.73
GLOBULIN UR ELPH-MCNC: 3.1 GM/DL
GLUCOSE BLD-MCNC: 103 MG/DL (ref 65–99)
HCT VFR BLD AUTO: 39.6 % (ref 37.5–51)
HGB BLD-MCNC: 13.4 G/DL (ref 13–17.7)
IMM GRANULOCYTES # BLD AUTO: 0 10*3/MM3 (ref 0–0.05)
IMM GRANULOCYTES NFR BLD AUTO: 0 % (ref 0–0.5)
IRON 24H UR-MRATE: 54 MCG/DL (ref 59–158)
IRON SATN MFR SERPL: 15 % (ref 20–50)
LYMPHOCYTES # BLD AUTO: 1.76 10*3/MM3 (ref 0.7–3.1)
LYMPHOCYTES NFR BLD AUTO: 37.9 % (ref 19.6–45.3)
MCH RBC QN AUTO: 30.5 PG (ref 26.6–33)
MCHC RBC AUTO-ENTMCNC: 33.8 G/DL (ref 31.5–35.7)
MCV RBC AUTO: 90 FL (ref 79–97)
MONOCYTES # BLD AUTO: 0.36 10*3/MM3 (ref 0.1–0.9)
MONOCYTES NFR BLD AUTO: 7.8 % (ref 5–12)
NEUTROPHILS # BLD AUTO: 2.38 10*3/MM3 (ref 1.7–7)
NEUTROPHILS NFR BLD AUTO: 51.3 % (ref 42.7–76)
NRBC BLD AUTO-RTO: 0 /100 WBC (ref 0–0.2)
PLATELET # BLD AUTO: 153 10*3/MM3 (ref 140–450)
PMV BLD AUTO: 12.2 FL (ref 6–12)
POTASSIUM BLD-SCNC: 3.9 MMOL/L (ref 3.5–5.2)
PROT SERPL-MCNC: 6.9 G/DL (ref 6–8.5)
RBC # BLD AUTO: 4.4 10*6/MM3 (ref 4.14–5.8)
SODIUM BLD-SCNC: 140 MMOL/L (ref 136–145)
TIBC SERPL-MCNC: 355 MCG/DL (ref 298–536)
TRANSFERRIN SERPL-MCNC: 238 MG/DL (ref 200–360)
WBC NRBC COR # BLD: 4.64 10*3/MM3 (ref 3.4–10.8)

## 2019-05-06 ENCOUNTER — OFFICE VISIT (OUTPATIENT)
Dept: FAMILY MEDICINE CLINIC | Facility: CLINIC | Age: 54
End: 2019-05-06

## 2019-05-06 ENCOUNTER — TELEPHONE (OUTPATIENT)
Dept: FAMILY MEDICINE CLINIC | Facility: CLINIC | Age: 54
End: 2019-05-06

## 2019-05-06 VITALS
WEIGHT: 236.8 LBS | HEIGHT: 71 IN | OXYGEN SATURATION: 98 % | DIASTOLIC BLOOD PRESSURE: 78 MMHG | TEMPERATURE: 98.9 F | HEART RATE: 73 BPM | BODY MASS INDEX: 33.15 KG/M2 | SYSTOLIC BLOOD PRESSURE: 118 MMHG

## 2019-05-06 DIAGNOSIS — R59.0 CERVICAL LYMPHADENOPATHY: Primary | ICD-10-CM

## 2019-05-06 DIAGNOSIS — E66.9 CLASS 1 OBESITY WITH BODY MASS INDEX (BMI) OF 34.0 TO 34.9 IN ADULT, UNSPECIFIED OBESITY TYPE, UNSPECIFIED WHETHER SERIOUS COMORBIDITY PRESENT: ICD-10-CM

## 2019-05-06 DIAGNOSIS — K21.00 GASTROESOPHAGEAL REFLUX DISEASE WITH ESOPHAGITIS: ICD-10-CM

## 2019-05-06 DIAGNOSIS — N40.0 PROSTATE HYPERTROPHY: ICD-10-CM

## 2019-05-06 DIAGNOSIS — F79 INTELLECTUAL DISABILITY: ICD-10-CM

## 2019-05-06 DIAGNOSIS — R50.9 LOW GRADE FEVER: ICD-10-CM

## 2019-05-06 DIAGNOSIS — D50.0 IRON DEFICIENCY ANEMIA DUE TO CHRONIC BLOOD LOSS: ICD-10-CM

## 2019-05-06 LAB — HETEROPH AB SER QL LA: NEGATIVE

## 2019-05-06 PROCEDURE — 85025 COMPLETE CBC W/AUTO DIFF WBC: CPT

## 2019-05-06 PROCEDURE — 99214 OFFICE O/P EST MOD 30 MIN: CPT | Performed by: FAMILY MEDICINE

## 2019-05-06 PROCEDURE — 80053 COMPREHEN METABOLIC PANEL: CPT

## 2019-05-06 PROCEDURE — 81015 MICROSCOPIC EXAM OF URINE: CPT

## 2019-05-06 PROCEDURE — 86140 C-REACTIVE PROTEIN: CPT

## 2019-05-06 PROCEDURE — 86308 HETEROPHILE ANTIBODY SCREEN: CPT | Performed by: FAMILY MEDICINE

## 2019-05-06 NOTE — PROGRESS NOTES
Subjective:  Orlin Mendes is a 53 y.o. male who presents for       Patient Active Problem List   Diagnosis   • Mental retardation   • Prostate hypertrophy   • Gastroesophageal reflux disease   • Preoperative clearance   • Anxiety   • Fever   • Fecal incontinence   • Callus   • Plantar wart of right foot   • Toenail fungus   • Benign prostatic hyperplasia   • Diarrhea   • Rash   • Cough   • Vomiting and diarrhea   • Melena   • Iron deficiency anemia due to chronic blood loss   • Gastroesophageal reflux disease with esophagitis   • History of colon polyps   • Sinusitis   • Generalized abdominal pain   • Intellectual disability   • Class 1 obesity with body mass index (BMI) of 34.0 to 34.9 in adult           Current Outpatient Medications:   •  acetaminophen (TYLENOL) 325 MG tablet, Take 2 tablets by mouth Every 4 (Four) Hours As Needed for Mild Pain ., Disp: 30 tablet, Rfl: 11  •  busPIRone (BUSPAR) 15 MG tablet, Take 15 mg by mouth 3 (Three) Times a Day., Disp: , Rfl:   •  chlorhexidine (PERIDEX) 0.12 % solution, Apply 15 mL to the mouth or throat 2 (Two) Times a Day., Disp: , Rfl:   •  citalopram (CeleXA) 20 MG tablet, Take 20 mg by mouth Daily., Disp: , Rfl:   •  Dextromethorphan-Guaifenesin 5-100 MG/5ML liquid, Take 1 teaspoon(s) by mouth 4 (Four) Times a Day As Needed (for cough)., Disp: 118 mL, Rfl: 11  •  esomeprazole (nexIUM) 40 MG capsule, Take one by mouth daily at 7 am, Disp: 30 capsule, Rfl: 11  •  ferrous sulfate 325 (65 FE) MG tablet, Take 1 tablet by mouth 2 (Two) Times a Day With Meals., Disp: 60 tablet, Rfl: 0  •  hydrocortisone 1 % ointment, Apply to rash or scrapes, Disp: 56 g, Rfl: 11  •  loperamide (IMODIUM) 2 MG capsule, Take 1 capsule by mouth 4 (Four) Times a Day As Needed for Diarrhea. Take 1 cap after each loose stool not to exceed 8 per day prn, Disp: 30 capsule, Rfl: 12  •  neomycin-polymyxin-pramoxine (ANTIBIOTIC PLUS PAIN RELIEF) 1 % cream, Apply  topically to the appropriate area  as directed 4 (Four) Times a Day As Needed (prn rash or scraps)., Disp: 28 g, Rfl: 11  •  tamsulosin (FLOMAX) 0.4 MG capsule 24 hr capsule, Take one capsule by mouth at 7 am daily, Disp: 30 capsule, Rfl: 11  •  thioridazine (MELLARIL) 25 MG tablet, 1 tab at 7 AM, 1 tab at 12 pm and 2 tabs in 7 PM every day, Disp: 210 tablet, Rfl: 11    HPI     PT of Coleen Peraza [place].  Pt is 54 yo male with history of intellectual impairment, GERD with esophagtitis, diverticulosis or large intestine.,  Anxiety iron deficiency due to GI blood loss, , anxiety is here today for issues on knot on his neck. He sees Podiatry for onychomycosis and Gastroenterology for GERD, iron deficiency and diverticulosis. He is here with caretaker who takes care of him at [facility].  He has been banging on  Face last night.  He has an enlarged lymph node on right submandibular area.. He did run a low grade temperature at 99.0. His appetite is normal.  He is afebrile. He has been aggressive lately at home..      Review of Systems  Review of Systems   Constitutional: Negative for activity change, appetite change, chills, diaphoresis, fatigue and fever.   HENT: Negative for congestion, postnasal drip, rhinorrhea, sinus pressure, sinus pain, sneezing, sore throat, trouble swallowing and voice change.    Respiratory: Negative for cough, choking, chest tightness, shortness of breath, wheezing and stridor.    Cardiovascular: Negative for chest pain.   Gastrointestinal: Negative for diarrhea, nausea and vomiting.   Neurological: Negative for weakness and headaches.       Patient Active Problem List   Diagnosis   • Mental retardation   • Prostate hypertrophy   • Gastroesophageal reflux disease   • Preoperative clearance   • Anxiety   • Fever   • Fecal incontinence   • Callus   • Plantar wart of right foot   • Toenail fungus   • Benign prostatic hyperplasia   • Diarrhea   • Rash   • Cough   • Vomiting and diarrhea   • Melena   • Iron deficiency anemia due to  chronic blood loss   • Gastroesophageal reflux disease with esophagitis   • History of colon polyps   • Sinusitis   • Generalized abdominal pain   • Intellectual disability   • Class 1 obesity with body mass index (BMI) of 34.0 to 34.9 in adult     Past Surgical History:   Procedure Laterality Date   • COLONOSCOPY     • COLONOSCOPY N/A 2/16/2018    Procedure: COLONOSCOPY;  Surgeon: Jus Aguilar MD;  Location: [facility] ENDOSCOPY;  Service:    • ENDOSCOPY N/A 10/27/2017    Procedure: ESOPHAGOGASTRODUODENOSCOPY;  Surgeon: Jus Aguilar MD;  Location: [facility] ENDOSCOPY;  Service:    • UPPER GASTROINTESTINAL ENDOSCOPY     • UPPER GASTROINTESTINAL ENDOSCOPY  10/27/2017     Social History     Socioeconomic History   • Marital status: Single     Spouse name: Not on file   • Number of children: Not on file   • Years of education: Not on file   • Highest education level: Not on file   Tobacco Use   • Smoking status: Never Smoker   • Smokeless tobacco: Never Used   Substance and Sexual Activity   • Alcohol use: No   • Drug use: No   • Sexual activity: Defer     Family History   Problem Relation Age of Onset   • Heart disease Mother    • Diabetes Mother    • Heart disease Father    • Heart disease Sister    • No Known Problems Brother    • No Known Problems Maternal Grandmother    • No Known Problems Maternal Grandfather    • No Known Problems Paternal Grandmother    • No Known Problems Paternal Grandfather    • No Known Problems Sister    • No Known Problems Sister      Lab on 04/29/2019   Component Date Value Ref Range Status   • WBC 04/29/2019 4.64  3.40 - 10.80 10*3/mm3 Final   • RBC 04/29/2019 4.40  4.14 - 5.80 10*6/mm3 Final   • Hemoglobin 04/29/2019 13.4  13.0 - 17.7 g/dL Final   • Hematocrit 04/29/2019 39.6  37.5 - 51.0 % Final   • MCV 04/29/2019 90.0  79.0 - 97.0 fL Final   • MCH 04/29/2019 30.5  26.6 - 33.0 pg Final   • MCHC 04/29/2019 33.8  31.5 - 35.7 g/dL Final   • RDW 04/29/2019 11.9* 12.3 - 15.4 % Final   •  RDW-SD 04/29/2019 38.5  37.0 - 54.0 fl Final   • MPV 04/29/2019 12.2* 6.0 - 12.0 fL Final   • Platelets 04/29/2019 153  140 - 450 10*3/mm3 Final   • Neutrophil % 04/29/2019 51.3  42.7 - 76.0 % Final   • Lymphocyte % 04/29/2019 37.9  19.6 - 45.3 % Final   • Monocyte % 04/29/2019 7.8  5.0 - 12.0 % Final   • Eosinophil % 04/29/2019 2.6  0.3 - 6.2 % Final   • Basophil % 04/29/2019 0.4  0.0 - 1.5 % Final   • Immature Grans % 04/29/2019 0.0  0.0 - 0.5 % Final   • Neutrophils, Absolute 04/29/2019 2.38  1.70 - 7.00 10*3/mm3 Final   • Lymphocytes, Absolute 04/29/2019 1.76  0.70 - 3.10 10*3/mm3 Final   • Monocytes, Absolute 04/29/2019 0.36  0.10 - 0.90 10*3/mm3 Final   • Eosinophils, Absolute 04/29/2019 0.12  0.00 - 0.40 10*3/mm3 Final   • Basophils, Absolute 04/29/2019 0.02  0.00 - 0.20 10*3/mm3 Final   • Immature Grans, Absolute 04/29/2019 0.00  0.00 - 0.05 10*3/mm3 Final   • nRBC 04/29/2019 0.0  0.0 - 0.2 /100 WBC Final   • Glucose 04/29/2019 103* 65 - 99 mg/dL Final   • BUN 04/29/2019 8  6 - 20 mg/dL Final   • Creatinine 04/29/2019 0.77  0.76 - 1.27 mg/dL Final   • Sodium 04/29/2019 140  136 - 145 mmol/L Final   • Potassium 04/29/2019 3.9  3.5 - 5.2 mmol/L Final   • Chloride 04/29/2019 104  98 - 107 mmol/L Final   • CO2 04/29/2019 24.7  22.0 - 29.0 mmol/L Final   • Calcium 04/29/2019 9.3  8.6 - 10.5 mg/dL Final   • Total Protein 04/29/2019 6.9  6.0 - 8.5 g/dL Final   • Albumin 04/29/2019 3.80  3.50 - 5.20 g/dL Final   • ALT (SGPT) 04/29/2019 17  1 - 41 U/L Final   • AST (SGOT) 04/29/2019 18  1 - 40 U/L Final   • Alkaline Phosphatase 04/29/2019 61  39 - 117 U/L Final   • Total Bilirubin 04/29/2019 0.3  0.2 - 1.2 mg/dL Final   • eGFR Non African Amer 04/29/2019 106  >60 mL/min/1.73 Final   • Globulin 04/29/2019 3.1  gm/dL Final   • A/G Ratio 04/29/2019 1.2  g/dL Final   • BUN/Creatinine Ratio 04/29/2019 10.4  7.0 - 25.0 Final   • Anion Gap 04/29/2019 11.3  mmol/L Final   • Iron 04/29/2019 54* 59 - 158 mcg/dL Final   • Iron  "Saturation 04/29/2019 15* 20 - 50 % Final   • Transferrin 04/29/2019 238  200 - 360 mg/dL Final   • TIBC 04/29/2019 355  298 - 536 mcg/dL Final   • Ferritin 04/29/2019 180.00  30.00 - 400.00 ng/mL Final      XR Abdomen Flat & Upright (In Office)  Narrative: EXAM:      Radiograph(s), Abdomen       VIEWS:    2  DATE/TIME:   8/11/2017 5:41 PM CDT       INDICATION:        Fecal incontinence  COMPARISON:   none                 FINDINGS:             - bowel gas pattern:    nonobstructive   ; moderate stool load    - free air:    none       - soft tissue:    No gross evidence of organomegaly, an abdominal  mass, ascites       - calculi:    none projecting over gallbladder fossa, renal  shadows, or anticipated course of the ureters.       - osseous:      limited assessment, unremarkable for age.       - misc.:        .       Impression: CONCLUSION:             1. Non-obstructed bowel gas pattern, with moderate stool load.              If pain or symptoms persist beyond reasonable expectations, a  contrast enhanced CT examination is suggested, as is deemed  clinical appropriate.                                  Electronically signed by:  VIVIAN Cast MD  8/11/2017 5:43  PM CDT Workstation: [email or web address]    @"[email or web address]@    There is no immunization history on file for this patient.    The following portions of the patient's history were reviewed and updated as appropriate: allergies, current medications, past family history, past medical history, past social history, past surgical history and problem list.        Physical Exam  /78   Pulse 73   Temp 98.9 °F (37.2 °C)   Ht 180.3 cm (71\")   Wt 107 kg (236 lb 12.8 oz)   SpO2 98%   BMI 33.03 kg/m²     Physical Exam   Constitutional: He is oriented to person, place, and time. He appears well-developed and well-nourished.   HENT:   Head: Normocephalic and atraumatic.   Right Ear: External ear normal.   Ears:    Mouth/Throat:       Cervical lymphadenopathy on right " submandibular area.  nontender on palpation    Eyes: Conjunctivae and EOM are normal. Pupils are equal, round, and reactive to light.   Neck: Normal range of motion. Neck supple.   Cardiovascular: Normal rate, regular rhythm and normal heart sounds.   No murmur heard.  Pulmonary/Chest: Effort normal and breath sounds normal. No respiratory distress.   Abdominal: Soft. Bowel sounds are normal. He exhibits no distension. There is no tenderness.   Musculoskeletal: Normal range of motion. He exhibits no edema or deformity.   Neurological: He is alert and oriented to person, place, and time. No cranial nerve deficit.   Skin: Skin is warm. No rash noted. He is not diaphoretic. No erythema. No pallor.   Psychiatric: He has a normal mood and affect. His behavior is normal.   Nursing note and vitals reviewed.      Assessment/Plan   Problems Addressed this Visit        Digestive    Gastroesophageal reflux disease with esophagitis    Class 1 obesity with body mass index (BMI) of 34.0 to 34.9 in adult       Genitourinary    Prostate hypertrophy       Hematopoietic and Hemostatic    Iron deficiency anemia due to chronic blood loss       Other    Intellectual disability      Other Visit Diagnoses     Cervical lymphadenopathy    -  Primary    Relevant Orders    CBC Auto Differential    Comprehensive Metabolic Panel    C-reactive protein    Mononucleosis Screen    XR Chest PA & Lateral    Low grade fever        Relevant Orders    Urinalysis With Microscopic - Urine, Clean Catch        -knot on neck - recommend US of neck  -obesity counseled on weight loss  -cervical lymphadenopathy -  CBC, CMP, CRP, chest x-ray, UA. Consider US of neck.  Possible viral etiology in nature. Cento'rs criteria low for strep throat   -continue medications for iron deficiency anemia, GERD and BPH   -recheck in 2 weeks         This document has been electronically signed by Bhargav Osborn MD on May 6, 2019 1:50 PM                    This document has  been electronically signed by Bhargav Osborn MD on May 6, 2019 1:50 PM

## 2019-05-06 NOTE — PATIENT INSTRUCTIONS
Lymphadenopathy  Lymphadenopathy refers to swollen or enlarged lymph glands, also called lymph nodes. Lymph glands are part of your body's defense (immune) system, which protects the body from infections, germs, and diseases. Lymph glands are found in many locations in your body, including the neck, underarm, and groin.  Many things can cause lymph glands to become enlarged. When your immune system responds to germs, such as viruses or bacteria, infection-fighting cells and fluid build up. This causes the glands to grow in size. Usually, this is not something to worry about. The swelling and any soreness often go away without treatment. However, swollen lymph glands can also be caused by a number of diseases. Your health care provider may do various tests to help determine the cause. If the cause of your swollen lymph glands cannot be found, it is important to monitor your condition to make sure the swelling goes away.  Follow these instructions at home:  Watch your condition for any changes. The following actions may help to lessen any discomfort you are feeling:  · Get plenty of rest.  · Take medicines only as directed by your health care provider. Your health care provider may recommend over-the-counter medicines for pain.  · Apply moist heat compresses to the site of swollen lymph nodes as directed by your health care provider. This can help reduce any pain.  · Check your lymph nodes daily for any changes.  · Keep all follow-up visits as directed by your health care provider. This is important.    Contact a health care provider if:  · Your lymph nodes are still swollen after 2 weeks.  · Your swelling increases or spreads to other areas.  · Your lymph nodes are hard, seem fixed to the skin, or are growing rapidly.  · Your skin over the lymph nodes is red and inflamed.  · You have a fever.  · You have chills.  · You have fatigue.  · You develop a sore throat.  · You have abdominal pain.  · You have weight  loss.  · You have night sweats.  Get help right away if:  · You notice fluid leaking from the area of the enlarged lymph node.  · You have severe pain in any area of your body.  · You have chest pain.  · You have shortness of breath.  This information is not intended to replace advice given to you by your health care provider. Make sure you discuss any questions you have with your health care provider.  Document Released: 09/26/2009 Document Revised: 05/25/2017 Document Reviewed: 07/23/2015  [email or web address] Interactive Patient Education © 2018 [employer]

## 2019-05-06 NOTE — TELEPHONE ENCOUNTER
----- Message from Bhargav Osborn MD sent at 5/6/2019  2:44 PM CDT -----  Monospot test negative. Awaiting other labs  Called pt's care giver

## 2019-05-07 ENCOUNTER — TELEPHONE (OUTPATIENT)
Dept: FAMILY MEDICINE CLINIC | Facility: CLINIC | Age: 54
End: 2019-05-07

## 2019-05-07 ENCOUNTER — OFFICE VISIT (OUTPATIENT)
Dept: GASTROENTEROLOGY | Facility: CLINIC | Age: 54
End: 2019-05-07

## 2019-05-07 VITALS
HEIGHT: 71 IN | HEART RATE: 72 BPM | BODY MASS INDEX: 33.18 KG/M2 | WEIGHT: 237 LBS | DIASTOLIC BLOOD PRESSURE: 84 MMHG | SYSTOLIC BLOOD PRESSURE: 132 MMHG

## 2019-05-07 DIAGNOSIS — D50.0 IRON DEFICIENCY ANEMIA DUE TO CHRONIC BLOOD LOSS: Primary | ICD-10-CM

## 2019-05-07 DIAGNOSIS — K21.00 GASTROESOPHAGEAL REFLUX DISEASE WITH ESOPHAGITIS: ICD-10-CM

## 2019-05-07 DIAGNOSIS — K57.30 DIVERTICULOSIS OF LARGE INTESTINE WITHOUT HEMORRHAGE: ICD-10-CM

## 2019-05-07 LAB
ALBUMIN SERPL-MCNC: 4.4 G/DL (ref 3.5–5.2)
ALBUMIN/GLOB SERPL: 1.7 G/DL
ALP SERPL-CCNC: 61 U/L (ref 39–117)
ALT SERPL W P-5'-P-CCNC: 17 U/L (ref 1–41)
ANION GAP SERPL CALCULATED.3IONS-SCNC: 12.4 MMOL/L
AST SERPL-CCNC: 18 U/L (ref 1–40)
BACTERIA UR QL AUTO: NORMAL /HPF
BASOPHILS # BLD AUTO: 0.02 10*3/MM3 (ref 0–0.2)
BASOPHILS NFR BLD AUTO: 0.5 % (ref 0–1.5)
BILIRUB SERPL-MCNC: 0.2 MG/DL (ref 0.2–1.2)
BUN BLD-MCNC: 8 MG/DL (ref 6–20)
BUN/CREAT SERPL: 9.4 (ref 7–25)
CALCIUM SPEC-SCNC: 9.3 MG/DL (ref 8.6–10.5)
CHLORIDE SERPL-SCNC: 104 MMOL/L (ref 98–107)
CO2 SERPL-SCNC: 25.6 MMOL/L (ref 22–29)
CREAT BLD-MCNC: 0.85 MG/DL (ref 0.76–1.27)
CRP SERPL-MCNC: 0.31 MG/DL (ref 0–0.5)
DEPRECATED RDW RBC AUTO: 39.9 FL (ref 37–54)
EOSINOPHIL # BLD AUTO: 0.04 10*3/MM3 (ref 0–0.4)
EOSINOPHIL NFR BLD AUTO: 1.1 % (ref 0.3–6.2)
ERYTHROCYTE [DISTWIDTH] IN BLOOD BY AUTOMATED COUNT: 12.1 % (ref 12.3–15.4)
GFR SERPL CREATININE-BSD FRML MDRD: 95 ML/MIN/1.73
GLOBULIN UR ELPH-MCNC: 2.6 GM/DL
GLUCOSE BLD-MCNC: 91 MG/DL (ref 65–99)
HCT VFR BLD AUTO: 41.5 % (ref 37.5–51)
HGB BLD-MCNC: 13.8 G/DL (ref 13–17.7)
HYALINE CASTS UR QL AUTO: NORMAL /LPF
IMM GRANULOCYTES # BLD AUTO: 0.01 10*3/MM3 (ref 0–0.05)
IMM GRANULOCYTES NFR BLD AUTO: 0.3 % (ref 0–0.5)
LYMPHOCYTES # BLD AUTO: 0.7 10*3/MM3 (ref 0.7–3.1)
LYMPHOCYTES NFR BLD AUTO: 19.2 % (ref 19.6–45.3)
MCH RBC QN AUTO: 30.3 PG (ref 26.6–33)
MCHC RBC AUTO-ENTMCNC: 33.3 G/DL (ref 31.5–35.7)
MCV RBC AUTO: 91.2 FL (ref 79–97)
MONOCYTES # BLD AUTO: 0.45 10*3/MM3 (ref 0.1–0.9)
MONOCYTES NFR BLD AUTO: 12.4 % (ref 5–12)
NEUTROPHILS # BLD AUTO: 2.42 10*3/MM3 (ref 1.7–7)
NEUTROPHILS NFR BLD AUTO: 66.5 % (ref 42.7–76)
NRBC BLD AUTO-RTO: 0 /100 WBC (ref 0–0.2)
PLATELET # BLD AUTO: 146 10*3/MM3 (ref 140–450)
PMV BLD AUTO: 12.3 FL (ref 6–12)
POTASSIUM BLD-SCNC: 4.5 MMOL/L (ref 3.5–5.2)
PROT SERPL-MCNC: 7 G/DL (ref 6–8.5)
RBC # BLD AUTO: 4.55 10*6/MM3 (ref 4.14–5.8)
RBC # UR: NORMAL /HPF
REF LAB TEST METHOD: NORMAL
SODIUM BLD-SCNC: 142 MMOL/L (ref 136–145)
SQUAMOUS #/AREA URNS HPF: NORMAL /HPF
WBC NRBC COR # BLD: 3.64 10*3/MM3 (ref 3.4–10.8)
WBC UR QL AUTO: NORMAL /HPF

## 2019-05-07 PROCEDURE — 99213 OFFICE O/P EST LOW 20 MIN: CPT | Performed by: PHYSICIAN ASSISTANT

## 2019-05-07 RX ORDER — FERROUS SULFATE 325(65) MG
325 TABLET ORAL 2 TIMES DAILY WITH MEALS
Qty: 60 TABLET | Refills: 5 | Status: SHIPPED | OUTPATIENT
Start: 2019-05-07 | End: 2020-07-28

## 2019-05-07 NOTE — TELEPHONE ENCOUNTER
----- Message from Bhargav Osborn MD sent at 5/6/2019  7:58 PM CDT -----  Call pt's caretaker chest x-ray normal    Awaiting labwork. Thanks  Called care giver

## 2019-05-07 NOTE — PATIENT INSTRUCTIONS

## 2019-05-07 NOTE — PROGRESS NOTES
Chief Complaint   Patient presents with   • Anemia   • Heartburn   • Abdominal Pain       ENDO PROCEDURE ORDERED:    Subjective    Orlin Mendes is a 53 y.o. male. he is here today for follow-up.    History of Present Illness    Patient seen on a recheck of his GERD, anemia, abdominal pain. Last seen 11/06/2018 with one of his care providers. Currently denies any kind of abdominal pain. GERD appears to be doing well with the Nexium, no reports of nausea, vomiting, or dysphagia. No difficulty eating. Bowels are moving without blood or mucus. Weight is down 13 pounds since last visit, but today states he is eating well with no complaints. Last colonoscopy showed diverticulosis on 02/16/2018.     Laboratory on 04/29/2019, ferritin was 180, serum iron 54, 15% saturation, CMP showed glucose 103, otherwise normal. Low normal CBC.     ASSESSMENT/PLAN: Patient with chronic iron deficiency anemia, appears stable on current regimen. I recommended we continue him on the iron, his anemia has remained stable as long as we have kept him on the iron, we may need to consider reevaluation for possible blood loss, but patient appears asymptomatic currently. I would like to watch his weight carefully, I am a little concerned about his weight loss. Will plan followup in 6 months with CBC, CMP, iron prior, prescription given.        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 35-49) 01/21/2016   • Onychomycosis      Past Surgical History:   Procedure Laterality Date   •  COLONOSCOPY     • COLONOSCOPY N/A 2/16/2018    Procedure: COLONOSCOPY;  Surgeon: Jus Aguilar MD;  Location: [facility] ENDOSCOPY;  Service:    • ENDOSCOPY N/A 10/27/2017    Procedure: ESOPHAGOGASTRODUODENOSCOPY;  Surgeon: Jus Aguilar MD;  Location: [facility] ENDOSCOPY;  Service:    • UPPER GASTROINTESTINAL ENDOSCOPY     • UPPER GASTROINTESTINAL ENDOSCOPY  10/27/2017     Family History   Problem Relation Age of Onset   • Heart disease Mother    • Diabetes Mother    • Heart disease Father    • Heart disease Sister    • No Known Problems Brother    • No Known Problems Maternal Grandmother    • No Known Problems Maternal Grandfather    • No Known Problems Paternal Grandmother    • No Known Problems Paternal Grandfather    • No Known Problems Sister    • No Known Problems Sister        No Known Allergies  Social History     Socioeconomic History   • Marital status: Single     Spouse name: Not on file   • Number of children: Not on file   • Years of education: Not on file   • Highest education level: Not on file   Tobacco Use   • Smoking status: Never Smoker   • Smokeless tobacco: Never Used   Substance and Sexual Activity   • Alcohol use: No   • Drug use: No   • Sexual activity: Defer       Current Outpatient Medications:   •  acetaminophen (TYLENOL) 325 MG tablet, Take 2 tablets by mouth Every 4 (Four) Hours As Needed for Mild Pain ., Disp: 30 tablet, Rfl: 11  •  busPIRone (BUSPAR) 15 MG tablet, Take 15 mg by mouth 3 (Three) Times a Day., Disp: , Rfl:   •  chlorhexidine (PERIDEX) 0.12 % solution, Apply 15 mL to the mouth or throat 2 (Two) Times a Day., Disp: , Rfl:   •  citalopram (CeleXA) 20 MG tablet, Take 20 mg by mouth Daily., Disp: , Rfl:   •  Dextromethorphan-Guaifenesin 5-100 MG/5ML liquid, Take 1 teaspoon(s) by mouth 4 (Four) Times a Day As Needed (for cough)., Disp: 118 mL, Rfl: 11  •  esomeprazole (nexIUM) 40 MG capsule, Take one by mouth daily at 7 am, Disp: 30 capsule, Rfl: 11  •  ferrous sulfate 325 (65  "FE) MG tablet, Take 1 tablet by mouth 2 (Two) Times a Day With Meals., Disp: 60 tablet, Rfl: 5  •  hydrocortisone 1 % ointment, Apply to rash or scrapes, Disp: 56 g, Rfl: 11  •  loperamide (IMODIUM) 2 MG capsule, Take 1 capsule by mouth 4 (Four) Times a Day As Needed for Diarrhea. Take 1 cap after each loose stool not to exceed 8 per day prn, Disp: 30 capsule, Rfl: 12  •  neomycin-polymyxin-pramoxine (ANTIBIOTIC PLUS PAIN RELIEF) 1 % cream, Apply  topically to the appropriate area as directed 4 (Four) Times a Day As Needed (prn rash or scraps)., Disp: 28 g, Rfl: 11  •  tamsulosin (FLOMAX) 0.4 MG capsule 24 hr capsule, Take one capsule by mouth at 7 am daily, Disp: 30 capsule, Rfl: 11  •  thioridazine (MELLARIL) 25 MG tablet, 1 tab at 7 AM, 1 tab at 12 pm and 2 tabs in 7 PM every day, Disp: 210 tablet, Rfl: 11  Review of Systems  Review of Systems       Objective    /84 (BP Location: Left arm)   Pulse 72   Ht 180.3 cm (71\")   Wt 108 kg (237 lb)   BMI 33.05 kg/m²   Physical Exam   Constitutional: He is oriented to person, place, and time. He appears well-developed and well-nourished. No distress.   HENT:   Head: Normocephalic and atraumatic.   Eyes: EOM are normal. Pupils are equal, round, and reactive to light.   Neck: Normal range of motion.   Cardiovascular: Normal rate, regular rhythm and normal heart sounds.   Pulmonary/Chest: Effort normal and breath sounds normal.   Abdominal: Soft. Bowel sounds are normal. He exhibits no shifting dullness, no distension, no abdominal bruit, no ascites and no mass. There is no hepatosplenomegaly. There is tenderness. There is no rigidity, no rebound, no guarding and no CVA tenderness. No hernia. Hernia confirmed negative in the ventral area.   Musculoskeletal: Normal range of motion.   Neurological: He is alert and oriented to person, place, and time.   Skin: Skin is warm and dry.   Psychiatric: He has a normal mood and affect. His behavior is normal. Judgment and " thought content normal.   Nursing note and vitals reviewed.    Assessment/Plan      1. Iron deficiency anemia due to chronic blood loss    2. Gastroesophageal reflux disease with esophagitis    3. Diverticulosis of large intestine without hemorrhage    .   Orlin was seen today for anemia, heartburn and abdominal pain.    Diagnoses and all orders for this visit:    Iron deficiency anemia due to chronic blood loss  -     CBC Auto Differential; Future  -     Comprehensive Metabolic Panel; Future  -     Iron Profile; Future    Gastroesophageal reflux disease with esophagitis    Diverticulosis of large intestine without hemorrhage  -     CBC Auto Differential; Future  -     Comprehensive Metabolic Panel; Future  -     Iron Profile; Future    Other orders  -     ferrous sulfate 325 (65 FE) MG tablet; Take 1 tablet by mouth 2 (Two) Times a Day With Meals.        Orders placed during this encounter include:  Orders Placed This Encounter   Procedures   • CBC Auto Differential     Due prior to follow up in November     Standing Status:   Future     Standing Expiration Date:   11/30/2019   • Comprehensive Metabolic Panel     Due prior to follow up in November     Standing Status:   Future     Standing Expiration Date:   11/30/2019   • Iron Profile     Due prior to follow up in November     Standing Status:   Future     Standing Expiration Date:   11/30/2019       Medications prescribed:  New Medications Ordered This Visit   Medications   • ferrous sulfate 325 (65 FE) MG tablet     Sig: Take 1 tablet by mouth 2 (Two) Times a Day With Meals.     Dispense:  60 tablet     Refill:  5       Requested Prescriptions     Signed Prescriptions Disp Refills   • ferrous sulfate 325 (65 FE) MG tablet 60 tablet 5     Sig: Take 1 tablet by mouth 2 (Two) Times a Day With Meals.       Review and/or summary of lab tests, radiology, procedures, medications. Review and summary of old records and obtaining of history. The risks and benefits of my  recommendations, as well as other treatment options were discussed with the patient today. Questions were answered.    Follow-up: Return in about 6 months (around 11/7/2019), or if symptoms worsen or fail to improve, for lab prior.     * Surgery not found *      This document has been electronically signed by Bhargav Foreman PA-C on May 10, 2019 2:42 PM      Results for orders placed or performed in visit on 04/29/19   Urinalysis, Microscopic Only - Urine, Clean Catch   Result Value Ref Range    RBC, UA 0-2 None Seen, 0-2 /HPF    WBC, UA 0-2 None Seen, 0-2 /HPF    Bacteria, UA None Seen None Seen /HPF    Squamous Epithelial Cells, UA None Seen None Seen, 0-2 /HPF    Hyaline Casts, UA None Seen None Seen /LPF    Methodology Automated Microscopy    CBC Auto Differential   Result Value Ref Range    WBC 3.64 3.40 - 10.80 10*3/mm3    RBC 4.55 4.14 - 5.80 10*6/mm3    Hemoglobin 13.8 13.0 - 17.7 g/dL    Hematocrit 41.5 37.5 - 51.0 %    MCV 91.2 79.0 - 97.0 fL    MCH 30.3 26.6 - 33.0 pg    MCHC 33.3 31.5 - 35.7 g/dL    RDW 12.1 (L) 12.3 - 15.4 %    RDW-SD 39.9 37.0 - 54.0 fl    MPV 12.3 (H) 6.0 - 12.0 fL    Platelets 146 140 - 450 10*3/mm3    Neutrophil % 66.5 42.7 - 76.0 %    Lymphocyte % 19.2 (L) 19.6 - 45.3 %    Monocyte % 12.4 (H) 5.0 - 12.0 %    Eosinophil % 1.1 0.3 - 6.2 %    Basophil % 0.5 0.0 - 1.5 %    Immature Grans % 0.3 0.0 - 0.5 %    Neutrophils, Absolute 2.42 1.70 - 7.00 10*3/mm3    Lymphocytes, Absolute 0.70 0.70 - 3.10 10*3/mm3    Monocytes, Absolute 0.45 0.10 - 0.90 10*3/mm3    Eosinophils, Absolute 0.04 0.00 - 0.40 10*3/mm3    Basophils, Absolute 0.02 0.00 - 0.20 10*3/mm3    Immature Grans, Absolute 0.01 0.00 - 0.05 10*3/mm3    nRBC 0.0 0.0 - 0.2 /100 WBC   CBC Auto Differential   Result Value Ref Range    WBC 4.64 3.40 - 10.80 10*3/mm3    RBC 4.40 4.14 - 5.80 10*6/mm3    Hemoglobin 13.4 13.0 - 17.7 g/dL    Hematocrit 39.6 37.5 - 51.0 %    MCV 90.0 79.0 - 97.0 fL    MCH 30.5 26.6 - 33.0 pg    MCHC 33.8  31.5 - 35.7 g/dL    RDW 11.9 (L) 12.3 - 15.4 %    RDW-SD 38.5 37.0 - 54.0 fl    MPV 12.2 (H) 6.0 - 12.0 fL    Platelets 153 140 - 450 10*3/mm3    Neutrophil % 51.3 42.7 - 76.0 %    Lymphocyte % 37.9 19.6 - 45.3 %    Monocyte % 7.8 5.0 - 12.0 %    Eosinophil % 2.6 0.3 - 6.2 %    Basophil % 0.4 0.0 - 1.5 %    Immature Grans % 0.0 0.0 - 0.5 %    Neutrophils, Absolute 2.38 1.70 - 7.00 10*3/mm3    Lymphocytes, Absolute 1.76 0.70 - 3.10 10*3/mm3    Monocytes, Absolute 0.36 0.10 - 0.90 10*3/mm3    Eosinophils, Absolute 0.12 0.00 - 0.40 10*3/mm3    Basophils, Absolute 0.02 0.00 - 0.20 10*3/mm3    Immature Grans, Absolute 0.00 0.00 - 0.05 10*3/mm3    nRBC 0.0 0.0 - 0.2 /100 WBC   Iron and TIBC   Result Value Ref Range    Iron 54 (L) 59 - 158 mcg/dL    Iron Saturation 15 (L) 20 - 50 %    Transferrin 238 200 - 360 mg/dL    TIBC 355 298 - 536 mcg/dL   Mononucleosis Screen   Result Value Ref Range    Monospot Negative Negative   C-reactive protein   Result Value Ref Range    C-Reactive Protein 0.31 0.00 - 0.50 mg/dL   Ferritin   Result Value Ref Range    Ferritin 180.00 30.00 - 400.00 ng/mL   Comprehensive Metabolic Panel   Result Value Ref Range    Glucose 91 65 - 99 mg/dL    BUN 8 6 - 20 mg/dL    Creatinine 0.85 0.76 - 1.27 mg/dL    Sodium 142 136 - 145 mmol/L    Potassium 4.5 3.5 - 5.2 mmol/L    Chloride 104 98 - 107 mmol/L    CO2 25.6 22.0 - 29.0 mmol/L    Calcium 9.3 8.6 - 10.5 mg/dL    Total Protein 7.0 6.0 - 8.5 g/dL    Albumin 4.40 3.50 - 5.20 g/dL    ALT (SGPT) 17 1 - 41 U/L    AST (SGOT) 18 1 - 40 U/L    Alkaline Phosphatase 61 39 - 117 U/L    Total Bilirubin 0.2 0.2 - 1.2 mg/dL    eGFR Non African Amer 95 >60 mL/min/1.73    Globulin 2.6 gm/dL    A/G Ratio 1.7 g/dL    BUN/Creatinine Ratio 9.4 7.0 - 25.0    Anion Gap 12.4 mmol/L   Comprehensive Metabolic Panel   Result Value Ref Range    Glucose 103 (H) 65 - 99 mg/dL    BUN 8 6 - 20 mg/dL    Creatinine 0.77 0.76 - 1.27 mg/dL    Sodium 140 136 - 145 mmol/L    Potassium  3.9 3.5 - 5.2 mmol/L    Chloride 104 98 - 107 mmol/L    CO2 24.7 22.0 - 29.0 mmol/L    Calcium 9.3 8.6 - 10.5 mg/dL    Total Protein 6.9 6.0 - 8.5 g/dL    Albumin 3.80 3.50 - 5.20 g/dL    ALT (SGPT) 17 1 - 41 U/L    AST (SGOT) 18 1 - 40 U/L    Alkaline Phosphatase 61 39 - 117 U/L    Total Bilirubin 0.3 0.2 - 1.2 mg/dL    eGFR Non African Amer 106 >60 mL/min/1.73    Globulin 3.1 gm/dL    A/G Ratio 1.2 g/dL    BUN/Creatinine Ratio 10.4 7.0 - 25.0    Anion Gap 11.3 mmol/L   Results for orders placed or performed in visit on 10/30/18   CBC Auto Differential   Result Value Ref Range    WBC 4.22 3.20 - 9.80 10*3/mm3    RBC 4.50 4.37 - 5.74 10*6/mm3    Hemoglobin 13.7 13.7 - 17.3 g/dL    Hematocrit 40.2 39.0 - 49.0 %    MCV 89.3 80.0 - 98.0 fL    MCH 30.4 26.5 - 34.0 pg    MCHC 34.1 31.5 - 36.3 g/dL    RDW 12.2 11.5 - 14.5 %    RDW-SD 39.4 35.1 - 43.9 fl    MPV 11.0 8.0 - 12.0 fL    Platelets 153 150 - 450 10*3/mm3    Neutrophil % 47.2 37.0 - 80.0 %    Lymphocyte % 41.0 10.0 - 50.0 %    Monocyte % 8.8 0.0 - 12.0 %    Eosinophil % 2.6 0.0 - 7.0 %    Basophil % 0.2 0.0 - 2.0 %    Immature Grans % 0.2 0.0 - 0.5 %    Neutrophils, Absolute 1.99 (L) 2.00 - 8.60 10*3/mm3    Lymphocytes, Absolute 1.73 0.60 - 4.20 10*3/mm3    Monocytes, Absolute 0.37 0.00 - 0.90 10*3/mm3    Eosinophils, Absolute 0.11 0.00 - 0.70 10*3/mm3    Basophils, Absolute 0.01 0.00 - 0.20 10*3/mm3    Immature Grans, Absolute 0.01 0.00 - 0.02 10*3/mm3     *Note: Due to a large number of results and/or encounters for the requested time period, some results have not been displayed. A complete set of results can be found in Results Review.       Some portions of this note have been dictated using voice recognition software and may contain errors and/or omissions.

## 2019-05-08 ENCOUNTER — TELEPHONE (OUTPATIENT)
Dept: FAMILY MEDICINE CLINIC | Facility: CLINIC | Age: 54
End: 2019-05-08

## 2019-05-08 NOTE — TELEPHONE ENCOUNTER
----- Message from Bhargav Osborn MD sent at 5/8/2019  7:22 AM CDT -----  Call pt's caretaker    Kidney function stable  Liver funtion stable    CRP is not elevated which means pt has no active infection or inflammation.      His UA is normal     WBC is normal.  Will followup in clinic on his cervical lymphadenopathy and if still present will get US or CT of neck. Thanks  Called pt's care giver

## 2019-05-23 ENCOUNTER — OFFICE VISIT (OUTPATIENT)
Dept: FAMILY MEDICINE CLINIC | Facility: CLINIC | Age: 54
End: 2019-05-23

## 2019-05-23 VITALS
OXYGEN SATURATION: 98 % | RESPIRATION RATE: 18 BRPM | HEART RATE: 65 BPM | TEMPERATURE: 98 F | DIASTOLIC BLOOD PRESSURE: 90 MMHG | HEIGHT: 71 IN | BODY MASS INDEX: 34.02 KG/M2 | SYSTOLIC BLOOD PRESSURE: 123 MMHG | WEIGHT: 243 LBS

## 2019-05-23 DIAGNOSIS — R59.1 LYMPHADENOPATHY: Primary | ICD-10-CM

## 2019-05-23 PROCEDURE — 99213 OFFICE O/P EST LOW 20 MIN: CPT | Performed by: NURSE PRACTITIONER

## 2019-05-23 RX ORDER — AMOXICILLIN AND CLAVULANATE POTASSIUM 875; 125 MG/1; MG/1
1 TABLET, FILM COATED ORAL 2 TIMES DAILY
Qty: 20 TABLET | Refills: 0 | Status: SHIPPED | OUTPATIENT
Start: 2019-05-23 | End: 2019-08-23

## 2019-05-23 NOTE — PROGRESS NOTES
Subjective   Orlin Mendes is a 53 y.o. male.     Here today for shahriar.  About 2 weeks ago he started displaying disruptive behavior.  Reports that he became ill with congestion and enlarged lymph node.  Took robitussin and tylenol.        Illness   This is a new (large lymph node on the right) problem. The current episode started 1 to 4 weeks ago. The problem occurs constantly. The problem has been unchanged. Pertinent negatives include no abdominal pain, anorexia, arthralgias, change in bowel habit, chest pain, chills, congestion, coughing, diaphoresis, fatigue, fever, headaches, joint swelling, myalgias, nausea, neck pain, numbness, rash, sore throat, swollen glands, urinary symptoms, vertigo, visual change, vomiting or weakness. Nothing aggravates the symptoms. He has tried nothing for the symptoms. The treatment provided no relief.        The following portions of the patient's history were reviewed and updated as appropriate: allergies, current medications, past family history, past medical history, past social history, past surgical history and problem list.    Review of Systems   Constitutional: Negative.  Negative for chills, diaphoresis, fatigue and fever.   HENT: Negative.  Negative for congestion and sore throat.    Eyes: Negative.    Respiratory: Negative.  Negative for cough.    Cardiovascular: Negative.  Negative for chest pain.   Gastrointestinal: Negative.  Negative for abdominal pain, anorexia, change in bowel habit, nausea and vomiting.   Endocrine: Negative.    Genitourinary: Negative.    Musculoskeletal: Negative.  Negative for arthralgias, joint swelling, myalgias and neck pain.   Skin: Negative.  Negative for rash.   Allergic/Immunologic: Negative.    Neurological: Negative for vertigo, weakness and numbness.   Hematological: Negative.        Objective   Physical Exam   Constitutional: He is oriented to person, place, and time. He appears well-developed and well-nourished. No distress.    HENT:   Head: Normocephalic and atraumatic.   Right Ear: External ear normal.   Left Ear: External ear normal.   Nose: Nose normal.   Mouth/Throat: Oropharynx is clear and moist. No oropharyngeal exudate.   Eyes: Pupils are equal, round, and reactive to light.   Neck: Normal range of motion. Neck supple. No thyromegaly present.   Cardiovascular: Normal rate, regular rhythm and normal heart sounds. Exam reveals no friction rub.   No murmur heard.  Pulmonary/Chest: Effort normal and breath sounds normal. No respiratory distress. He has no wheezes. He has no rales.   Abdominal: Soft.   Musculoskeletal: Normal range of motion.   Lymphadenopathy:        Head (right side): Tonsillar adenopathy present.   Neurological: He is alert and oriented to person, place, and time.   Skin: Skin is warm and dry.   Psychiatric: He has a normal mood and affect. Thought content normal.   Nursing note and vitals reviewed.        Assessment/Plan   Orlin was seen today for cervical lymphadenopathy and anemia.    Diagnoses and all orders for this visit:    Lymphadenopathy  -     amoxicillin-clavulanate (AUGMENTIN) 875-125 MG per tablet; Take 1 tablet by mouth 2 (Two) Times a Day.      Adrián in 2 weeks.

## 2019-05-23 NOTE — PATIENT INSTRUCTIONS
Calorie Counting for Weight Loss  Calories are units of energy. Your body needs a certain amount of calories from food to keep you going throughout the day. When you eat more calories than your body needs, your body stores the extra calories as fat. When you eat fewer calories than your body needs, your body burns fat to get the energy it needs.  Calorie counting means keeping track of how many calories you eat and drink each day. Calorie counting can be helpful if you need to lose weight. If you make sure to eat fewer calories than your body needs, you should lose weight. Ask your health care provider what a healthy weight is for you.  For calorie counting to work, you will need to eat the right number of calories in a day in order to lose a healthy amount of weight per week. A dietitian can help you determine how many calories you need in a day and will give you suggestions on how to reach your calorie goal.  · A healthy amount of weight to lose per week is usually 1-2 lb (0.5-0.9 kg). This usually means that your daily calorie intake should be reduced by 500-750 calories.  · Eating 1,200 - 1,500 calories per day can help most women lose weight.  · Eating 1,500 - 1,800 calories per day can help most men lose weight.    What is my plan?  My goal is to have __________ calories per day.  If I have this many calories per day, I should lose around __________ pounds per week.  What do I need to know about calorie counting?  In order to meet your daily calorie goal, you will need to:  · Find out how many calories are in each food you would like to eat. Try to do this before you eat.  · Decide how much of the food you plan to eat.  · Write down what you ate and how many calories it had. Doing this is called keeping a food log.    To successfully lose weight, it is important to balance calorie counting with a healthy lifestyle that includes regular activity. Aim for 150 minutes of moderate exercise (such as walking) or 75  minutes of vigorous exercise (such as running) each week.  Where do I find calorie information?    The number of calories in a food can be found on a Nutrition Facts label. If a food does not have a Nutrition Facts label, try to look up the calories online or ask your dietitian for help.  Remember that calories are listed per serving. If you choose to have more than one serving of a food, you will have to multiply the calories per serving by the amount of servings you plan to eat. For example, the label on a package of bread might say that a serving size is 1 slice and that there are 90 calories in a serving. If you eat 1 slice, you will have eaten 90 calories. If you eat 2 slices, you will have eaten 180 calories.  How do I keep a food log?  Immediately after each meal, record the following information in your food log:  · What you ate. Don't forget to include toppings, sauces, and other extras on the food.  · How much you ate. This can be measured in cups, ounces, or number of items.  · How many calories each food and drink had.  · The total number of calories in the meal.    Keep your food log near you, such as in a small notebook in your pocket, or use a mobile jg or website. Some programs will calculate calories for you and show you how many calories you have left for the day to meet your goal.  What are some calorie counting tips?  · Use your calories on foods and drinks that will fill you up and not leave you hungry:  ? Some examples of foods that fill you up are nuts and nut butters, vegetables, lean proteins, and high-fiber foods like whole grains. High-fiber foods are foods with more than 5 g fiber per serving.  ? Drinks such as sodas, specialty coffee drinks, alcohol, and juices have a lot of calories, yet do not fill you up.  · Eat nutritious foods and avoid empty calories. Empty calories are calories you get from foods or beverages that do not have many vitamins or protein, such as candy, sweets, and  "soda. It is better to have a nutritious high-calorie food (such as an avocado) than a food with few nutrients (such as a bag of chips).  · Know how many calories are in the foods you eat most often. This will help you calculate calorie counts faster.  · Pay attention to calories in drinks. Low-calorie drinks include water and unsweetened drinks.  · Pay attention to nutrition labels for \"low fat\" or \"fat free\" foods. These foods sometimes have the same amount of calories or more calories than the full fat versions. They also often have added sugar, starch, or salt, to make up for flavor that was removed with the fat.  · Find a way of tracking calories that works for you. Get creative. Try different apps or programs if writing down calories does not work for you.  What are some portion control tips?  · Know how many calories are in a serving. This will help you know how many servings of a certain food you can have.  · Use a measuring cup to measure serving sizes. You could also try weighing out portions on a kitchen scale. With time, you will be able to estimate serving sizes for some foods.  · Take some time to put servings of different foods on your favorite plates, bowls, and cups so you know what a serving looks like.  · Try not to eat straight from a bag or box. Doing this can lead to overeating. Put the amount you would like to eat in a cup or on a plate to make sure you are eating the right portion.  · Use smaller plates, glasses, and bowls to prevent overeating.  · Try not to multitask (for example, watch TV or use your computer) while eating. If it is time to eat, sit down at a table and enjoy your food. This will help you to know when you are full. It will also help you to be aware of what you are eating and how much you are eating.  What are tips for following this plan?  Reading food labels  · Check the calorie count compared to the serving size. The serving size may be smaller than what you are used to " eating.  · Check the source of the calories. Make sure the food you are eating is high in vitamins and protein and low in saturated and trans fats.  Shopping  · Read nutrition labels while you shop. This will help you make healthy decisions before you decide to purchase your food.  · Make a grocery list and stick to it.  Cooking  · Try to cook your favorite foods in a healthier way. For example, try baking instead of frying.  · Use low-fat dairy products.  Meal planning  · Use more fruits and vegetables. Half of your plate should be fruits and vegetables.  · Include lean proteins like poultry and fish.  How do I count calories when eating out?  · Ask for smaller portion sizes.  · Consider sharing an entree and sides instead of getting your own entree.  · If you get your own entree, eat only half. Ask for a box at the beginning of your meal and put the rest of your entree in it so you are not tempted to eat it.  · If calories are listed on the menu, choose the lower calorie options.  · Choose dishes that include vegetables, fruits, whole grains, low-fat dairy products, and lean protein.  · Choose items that are boiled, broiled, grilled, or steamed. Stay away from items that are buttered, battered, fried, or served with cream sauce. Items labeled “crispy” are usually fried, unless stated otherwise.  · Choose water, low-fat milk, unsweetened iced tea, or other drinks without added sugar. If you want an alcoholic beverage, choose a lower calorie option such as a glass of wine or light beer.  · Ask for dressings, sauces, and syrups on the side. These are usually high in calories, so you should limit the amount you eat.  · If you want a salad, choose a garden salad and ask for grilled meats. Avoid extra toppings like noble, cheese, or fried items. Ask for the dressing on the side, or ask for olive oil and vinegar or lemon to use as dressing.  · Estimate how many servings of a food you are given. For example, a serving of  cooked rice is ½ cup or about the size of half a baseball. Knowing serving sizes will help you be aware of how much food you are eating at restaurants. The list below tells you how big or small some common portion sizes are based on everyday objects:  ? 1 oz--4 stacked dice.  ? 3 oz--1 deck of cards.  ? 1 tsp--1 die.  ? 1 Tbsp--½ a ping-pong ball.  ? 2 Tbsp--1 ping-pong ball.  ? ½ cup--½ baseball.  ? 1 cup--1 baseball.  Summary  · Calorie counting means keeping track of how many calories you eat and drink each day. If you eat fewer calories than your body needs, you should lose weight.  · A healthy amount of weight to lose per week is usually 1-2 lb (0.5-0.9 kg). This usually means reducing your daily calorie intake by 500-750 calories.  · The number of calories in a food can be found on a Nutrition Facts label. If a food does not have a Nutrition Facts label, try to look up the calories online or ask your dietitian for help.  · Use your calories on foods and drinks that will fill you up, and not on foods and drinks that will leave you hungry.  · Use smaller plates, glasses, and bowls to prevent overeating.  This information is not intended to replace advice given to you by your health care provider. Make sure you discuss any questions you have with your health care provider.  Document Released: 12/18/2006 Document Revised: 11/17/2017 Document Reviewed: 11/17/2017  [employer] Interactive Patient Education © 2019 [employer]      Exercising to Lose Weight  Exercising can help you to lose weight. In order to lose weight through exercise, you need to do vigorous-intensity exercise. You can tell that you are exercising with vigorous intensity if you are breathing very hard and fast and cannot hold a conversation while exercising.  Moderate-intensity exercise helps to maintain your current weight. You can tell that you are exercising at a moderate level if you have a higher heart rate and faster breathing, but you are  still able to hold a conversation.  How often should I exercise?  Choose an activity that you enjoy and set realistic goals. Your health care provider can help you to make an activity plan that works for you. Exercise regularly as directed by your health care provider. This may include:  · Doing resistance training twice each week, such as:  ? Push-ups.  ? Sit-ups.  ? Lifting weights.  ? Using resistance bands.  · Doing a given intensity of exercise for a given amount of time. Choose from these options:  ? 150 minutes of moderate-intensity exercise every week.  ? 75 minutes of vigorous-intensity exercise every week.  ? A mix of moderate-intensity and vigorous-intensity exercise every week.    Children, pregnant women, people who are out of shape, people who are overweight, and older adults may need to consult a health care provider for individual recommendations. If you have any sort of medical condition, be sure to consult your health care provider before starting a new exercise program.  What are some activities that can help me to lose weight?  · Walking at a rate of at least 4.5 miles an hour.  · Jogging or running at a rate of 5 miles per hour.  · Biking at a rate of at least 10 miles per hour.  · Lap swimming.  · Roller-skating or in-line skating.  · Cross-country skiing.  · Vigorous competitive sports, such as football, basketball, and soccer.  · Jumping rope.  · Aerobic dancing.  How can I be more active in my day-to-day activities?  · Use the stairs instead of the elevator.  · Take a walk during your lunch break.  · If you drive, park your car farther away from work or school.  · If you take public transportation, get off one stop early and walk the rest of the way.  · Make all of your phone calls while standing up and walking around.  · Get up, stretch, and walk around every 30 minutes throughout the day.  What guidelines should I follow while exercising?  · Do not exercise so much that you hurt yourself,  feel dizzy, or get very short of breath.  · Consult your health care provider prior to starting a new exercise program.  · Wear comfortable clothes and shoes with good support.  · Drink plenty of water while you exercise to prevent dehydration or heat stroke. Body water is lost during exercise and must be replaced.  · Work out until you breathe faster and your heart beats faster.  This information is not intended to replace advice given to you by your health care provider. Make sure you discuss any questions you have with your health care provider.  Document Released: 01/20/2012 Document Revised: 05/25/2017 Document Reviewed: 05/21/2015  [employer] Interactive Patient Education © 2019 [employer]

## 2019-06-06 ENCOUNTER — OFFICE VISIT (OUTPATIENT)
Dept: FAMILY MEDICINE CLINIC | Facility: CLINIC | Age: 54
End: 2019-06-06

## 2019-06-06 VITALS
HEART RATE: 69 BPM | BODY MASS INDEX: 33.6 KG/M2 | WEIGHT: 240 LBS | HEIGHT: 71 IN | RESPIRATION RATE: 20 BRPM | SYSTOLIC BLOOD PRESSURE: 117 MMHG | DIASTOLIC BLOOD PRESSURE: 76 MMHG | TEMPERATURE: 98.4 F | OXYGEN SATURATION: 98 %

## 2019-06-06 DIAGNOSIS — R59.1 LYMPHADENOPATHY: Primary | ICD-10-CM

## 2019-06-06 PROCEDURE — 99212 OFFICE O/P EST SF 10 MIN: CPT | Performed by: NURSE PRACTITIONER

## 2019-06-07 NOTE — PATIENT INSTRUCTIONS
Calorie Counting for Weight Loss  Calories are units of energy. Your body needs a certain amount of calories from food to keep you going throughout the day. When you eat more calories than your body needs, your body stores the extra calories as fat. When you eat fewer calories than your body needs, your body burns fat to get the energy it needs.  Calorie counting means keeping track of how many calories you eat and drink each day. Calorie counting can be helpful if you need to lose weight. If you make sure to eat fewer calories than your body needs, you should lose weight. Ask your health care provider what a healthy weight is for you.  For calorie counting to work, you will need to eat the right number of calories in a day in order to lose a healthy amount of weight per week. A dietitian can help you determine how many calories you need in a day and will give you suggestions on how to reach your calorie goal.  · A healthy amount of weight to lose per week is usually 1-2 lb (0.5-0.9 kg). This usually means that your daily calorie intake should be reduced by 500-750 calories.  · Eating 1,200 - 1,500 calories per day can help most women lose weight.  · Eating 1,500 - 1,800 calories per day can help most men lose weight.    What is my plan?  My goal is to have __________ calories per day.  If I have this many calories per day, I should lose around __________ pounds per week.  What do I need to know about calorie counting?  In order to meet your daily calorie goal, you will need to:  · Find out how many calories are in each food you would like to eat. Try to do this before you eat.  · Decide how much of the food you plan to eat.  · Write down what you ate and how many calories it had. Doing this is called keeping a food log.    To successfully lose weight, it is important to balance calorie counting with a healthy lifestyle that includes regular activity. Aim for 150 minutes of moderate exercise (such as walking) or 75  minutes of vigorous exercise (such as running) each week.  Where do I find calorie information?    The number of calories in a food can be found on a Nutrition Facts label. If a food does not have a Nutrition Facts label, try to look up the calories online or ask your dietitian for help.  Remember that calories are listed per serving. If you choose to have more than one serving of a food, you will have to multiply the calories per serving by the amount of servings you plan to eat. For example, the label on a package of bread might say that a serving size is 1 slice and that there are 90 calories in a serving. If you eat 1 slice, you will have eaten 90 calories. If you eat 2 slices, you will have eaten 180 calories.  How do I keep a food log?  Immediately after each meal, record the following information in your food log:  · What you ate. Don't forget to include toppings, sauces, and other extras on the food.  · How much you ate. This can be measured in cups, ounces, or number of items.  · How many calories each food and drink had.  · The total number of calories in the meal.    Keep your food log near you, such as in a small notebook in your pocket, or use a mobile jg or website. Some programs will calculate calories for you and show you how many calories you have left for the day to meet your goal.  What are some calorie counting tips?  · Use your calories on foods and drinks that will fill you up and not leave you hungry:  ? Some examples of foods that fill you up are nuts and nut butters, vegetables, lean proteins, and high-fiber foods like whole grains. High-fiber foods are foods with more than 5 g fiber per serving.  ? Drinks such as sodas, specialty coffee drinks, alcohol, and juices have a lot of calories, yet do not fill you up.  · Eat nutritious foods and avoid empty calories. Empty calories are calories you get from foods or beverages that do not have many vitamins or protein, such as candy, sweets, and  "soda. It is better to have a nutritious high-calorie food (such as an avocado) than a food with few nutrients (such as a bag of chips).  · Know how many calories are in the foods you eat most often. This will help you calculate calorie counts faster.  · Pay attention to calories in drinks. Low-calorie drinks include water and unsweetened drinks.  · Pay attention to nutrition labels for \"low fat\" or \"fat free\" foods. These foods sometimes have the same amount of calories or more calories than the full fat versions. They also often have added sugar, starch, or salt, to make up for flavor that was removed with the fat.  · Find a way of tracking calories that works for you. Get creative. Try different apps or programs if writing down calories does not work for you.  What are some portion control tips?  · Know how many calories are in a serving. This will help you know how many servings of a certain food you can have.  · Use a measuring cup to measure serving sizes. You could also try weighing out portions on a kitchen scale. With time, you will be able to estimate serving sizes for some foods.  · Take some time to put servings of different foods on your favorite plates, bowls, and cups so you know what a serving looks like.  · Try not to eat straight from a bag or box. Doing this can lead to overeating. Put the amount you would like to eat in a cup or on a plate to make sure you are eating the right portion.  · Use smaller plates, glasses, and bowls to prevent overeating.  · Try not to multitask (for example, watch TV or use your computer) while eating. If it is time to eat, sit down at a table and enjoy your food. This will help you to know when you are full. It will also help you to be aware of what you are eating and how much you are eating.  What are tips for following this plan?  Reading food labels  · Check the calorie count compared to the serving size. The serving size may be smaller than what you are used to " eating.  · Check the source of the calories. Make sure the food you are eating is high in vitamins and protein and low in saturated and trans fats.  Shopping  · Read nutrition labels while you shop. This will help you make healthy decisions before you decide to purchase your food.  · Make a grocery list and stick to it.  Cooking  · Try to cook your favorite foods in a healthier way. For example, try baking instead of frying.  · Use low-fat dairy products.  Meal planning  · Use more fruits and vegetables. Half of your plate should be fruits and vegetables.  · Include lean proteins like poultry and fish.  How do I count calories when eating out?  · Ask for smaller portion sizes.  · Consider sharing an entree and sides instead of getting your own entree.  · If you get your own entree, eat only half. Ask for a box at the beginning of your meal and put the rest of your entree in it so you are not tempted to eat it.  · If calories are listed on the menu, choose the lower calorie options.  · Choose dishes that include vegetables, fruits, whole grains, low-fat dairy products, and lean protein.  · Choose items that are boiled, broiled, grilled, or steamed. Stay away from items that are buttered, battered, fried, or served with cream sauce. Items labeled “crispy” are usually fried, unless stated otherwise.  · Choose water, low-fat milk, unsweetened iced tea, or other drinks without added sugar. If you want an alcoholic beverage, choose a lower calorie option such as a glass of wine or light beer.  · Ask for dressings, sauces, and syrups on the side. These are usually high in calories, so you should limit the amount you eat.  · If you want a salad, choose a garden salad and ask for grilled meats. Avoid extra toppings like noble, cheese, or fried items. Ask for the dressing on the side, or ask for olive oil and vinegar or lemon to use as dressing.  · Estimate how many servings of a food you are given. For example, a serving of  cooked rice is ½ cup or about the size of half a baseball. Knowing serving sizes will help you be aware of how much food you are eating at restaurants. The list below tells you how big or small some common portion sizes are based on everyday objects:  ? 1 oz--4 stacked dice.  ? 3 oz--1 deck of cards.  ? 1 tsp--1 die.  ? 1 Tbsp--½ a ping-pong ball.  ? 2 Tbsp--1 ping-pong ball.  ? ½ cup--½ baseball.  ? 1 cup--1 baseball.  Summary  · Calorie counting means keeping track of how many calories you eat and drink each day. If you eat fewer calories than your body needs, you should lose weight.  · A healthy amount of weight to lose per week is usually 1-2 lb (0.5-0.9 kg). This usually means reducing your daily calorie intake by 500-750 calories.  · The number of calories in a food can be found on a Nutrition Facts label. If a food does not have a Nutrition Facts label, try to look up the calories online or ask your dietitian for help.  · Use your calories on foods and drinks that will fill you up, and not on foods and drinks that will leave you hungry.  · Use smaller plates, glasses, and bowls to prevent overeating.  This information is not intended to replace advice given to you by your health care provider. Make sure you discuss any questions you have with your health care provider.  Document Released: 12/18/2006 Document Revised: 11/17/2017 Document Reviewed: 11/17/2017  [employer] Patient Education © 2019 [employer]      Exercising to Lose Weight  Exercising can help you to lose weight. In order to lose weight through exercise, you need to do vigorous-intensity exercise. You can tell that you are exercising with vigorous intensity if you are breathing very hard and fast and cannot hold a conversation while exercising.  Moderate-intensity exercise helps to maintain your current weight. You can tell that you are exercising at a moderate level if you have a higher heart rate and faster breathing, but you are  still able to hold a conversation.  How often should I exercise?  Choose an activity that you enjoy and set realistic goals. Your health care provider can help you to make an activity plan that works for you. Exercise regularly as directed by your health care provider. This may include:  · Doing resistance training twice each week, such as:  ? Push-ups.  ? Sit-ups.  ? Lifting weights.  ? Using resistance bands.  · Doing a given intensity of exercise for a given amount of time. Choose from these options:  ? 150 minutes of moderate-intensity exercise every week.  ? 75 minutes of vigorous-intensity exercise every week.  ? A mix of moderate-intensity and vigorous-intensity exercise every week.    Children, pregnant women, people who are out of shape, people who are overweight, and older adults may need to consult a health care provider for individual recommendations. If you have any sort of medical condition, be sure to consult your health care provider before starting a new exercise program.  What are some activities that can help me to lose weight?  · Walking at a rate of at least 4.5 miles an hour.  · Jogging or running at a rate of 5 miles per hour.  · Biking at a rate of at least 10 miles per hour.  · Lap swimming.  · Roller-skating or in-line skating.  · Cross-country skiing.  · Vigorous competitive sports, such as football, basketball, and soccer.  · Jumping rope.  · Aerobic dancing.  How can I be more active in my day-to-day activities?  · Use the stairs instead of the elevator.  · Take a walk during your lunch break.  · If you drive, park your car farther away from work or school.  · If you take public transportation, get off one stop early and walk the rest of the way.  · Make all of your phone calls while standing up and walking around.  · Get up, stretch, and walk around every 30 minutes throughout the day.  What guidelines should I follow while exercising?  · Do not exercise so much that you hurt yourself,  feel dizzy, or get very short of breath.  · Consult your health care provider prior to starting a new exercise program.  · Wear comfortable clothes and shoes with good support.  · Drink plenty of water while you exercise to prevent dehydration or heat stroke. Body water is lost during exercise and must be replaced.  · Work out until you breathe faster and your heart beats faster.  This information is not intended to replace advice given to you by your health care provider. Make sure you discuss any questions you have with your health care provider.  Document Released: 01/20/2012 Document Revised: 05/25/2017 Document Reviewed: 05/21/2015  [employer] Interactive Patient Education © 2019 [employer]

## 2019-06-07 NOTE — PROGRESS NOTES
Subjective   Orlin Mendes is a 53 y.o. male.     Here today for shahriar.  About 2 weeks ago he presented with a swollen area right parotid.  He was placed on antibiotics and is here today for shahriar.  Has done well and tolerated the meds well.      Swollen Glands   This is a new problem. The current episode started 1 to 4 weeks ago. The problem has been resolved. Associated symptoms include swollen glands. Pertinent negatives include no abdominal pain, anorexia, arthralgias, change in bowel habit, chest pain, chills, congestion, coughing, diaphoresis, fatigue, fever, headaches, joint swelling, myalgias, nausea, neck pain, numbness, rash, sore throat, urinary symptoms, vertigo, visual change, vomiting or weakness. Nothing aggravates the symptoms. Treatments tried: antibiotics. The treatment provided significant relief.        The following portions of the patient's history were reviewed and updated as appropriate: allergies, current medications, past family history, past medical history, past social history, past surgical history and problem list.    Review of Systems   Constitutional: Negative.  Negative for chills, diaphoresis, fatigue and fever.   HENT: Negative.  Negative for congestion and sore throat.    Eyes: Negative.    Respiratory: Negative.  Negative for cough.    Cardiovascular: Negative.  Negative for chest pain.   Gastrointestinal: Negative.  Negative for abdominal pain, anorexia, change in bowel habit, nausea and vomiting.   Endocrine: Negative.    Genitourinary: Negative.    Musculoskeletal: Negative.  Negative for arthralgias, joint swelling, myalgias and neck pain.   Skin: Negative.  Negative for rash.   Allergic/Immunologic: Negative.    Neurological: Negative.  Negative for vertigo, weakness and numbness.   Hematological: Negative.    Psychiatric/Behavioral: Negative.        Objective   Physical Exam   Constitutional: He is oriented to person, place, and time. He appears well-developed and  well-nourished. No distress.   HENT:   Head: Normocephalic and atraumatic.   Right Ear: External ear normal.   Left Ear: External ear normal.   Nose: Nose normal.   Mouth/Throat: Oropharynx is clear and moist. No oropharyngeal exudate.   Eyes: Pupils are equal, round, and reactive to light.   Neck: Normal range of motion. Neck supple. No thyromegaly present.   Cardiovascular: Normal rate, regular rhythm and normal heart sounds. Exam reveals no friction rub.   No murmur heard.  Pulmonary/Chest: Effort normal and breath sounds normal. No respiratory distress. He has no wheezes. He has no rales.   Abdominal: Soft.   Musculoskeletal: Normal range of motion.   Lymphadenopathy:        Head (right side): No submental, no submandibular, no tonsillar, no preauricular, no posterior auricular and no occipital adenopathy present.        Head (left side): No submental, no submandibular, no tonsillar, no preauricular, no posterior auricular and no occipital adenopathy present.     He has no cervical adenopathy.   Neurological: He is alert and oriented to person, place, and time.   Skin: Skin is warm and dry.   Psychiatric: He has a normal mood and affect. Thought content normal.   Nursing note and vitals reviewed.        Assessment/Plan   Orlin was seen today for swollen glands.    Diagnoses and all orders for this visit:    Lymphadenopathy  Comments:  resolved.  finish any left over antibiotics.

## 2019-07-12 ENCOUNTER — OFFICE VISIT (OUTPATIENT)
Dept: PODIATRY | Facility: CLINIC | Age: 54
End: 2019-07-12

## 2019-07-12 VITALS — OXYGEN SATURATION: 98 % | WEIGHT: 240 LBS | HEIGHT: 71 IN | BODY MASS INDEX: 33.6 KG/M2 | HEART RATE: 76 BPM

## 2019-07-12 DIAGNOSIS — M79.675 CHRONIC TOE PAIN, BILATERAL: ICD-10-CM

## 2019-07-12 DIAGNOSIS — G89.29 CHRONIC TOE PAIN, BILATERAL: ICD-10-CM

## 2019-07-12 DIAGNOSIS — F79 MENTAL RETARDATION: ICD-10-CM

## 2019-07-12 DIAGNOSIS — B35.1 ONYCHOMYCOSIS: Primary | ICD-10-CM

## 2019-07-12 DIAGNOSIS — M79.674 CHRONIC TOE PAIN, BILATERAL: ICD-10-CM

## 2019-07-12 PROCEDURE — 11721 DEBRIDE NAIL 6 OR MORE: CPT | Performed by: PODIATRIST

## 2019-07-12 NOTE — PROGRESS NOTES
Orlin Iyercourtney  1965  53 y.o. male     Patient presents today with a care giver for routine nail care.     7/12/2019  Chief Complaint   Patient presents with   • Left Foot - nail care   • Right Foot - nail care       History of Present Illness    Patient presents to clinic today for routine footcare.  He is accompanied by his caregiver.  Patient relates to painful, elongated and discolored toenails.  Pain is relieved debridement.      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 35-49) 01/21/2016   • Onychomycosis          Past Surgical History:   Procedure Laterality Date   • COLONOSCOPY     • COLONOSCOPY N/A 2/16/2018    Procedure: COLONOSCOPY;  Surgeon: Jus Aguilar MD;  Location: [facility] ENDOSCOPY;  Service:    • ENDOSCOPY N/A 10/27/2017    Procedure: ESOPHAGOGASTRODUODENOSCOPY;  Surgeon: Jus Aguilar MD;  Location: [facility] ENDOSCOPY;  Service:    • UPPER GASTROINTESTINAL ENDOSCOPY     • UPPER GASTROINTESTINAL ENDOSCOPY  10/27/2017         Family History   Problem Relation Age of Onset   • Heart disease Mother    • Diabetes Mother    • Heart disease Father    • Heart disease Sister    • No Known Problems Brother    • No Known Problems Maternal Grandmother    • No Known Problems Maternal Grandfather    • No Known Problems Paternal Grandmother    • No Known Problems Paternal Grandfather    • No Known Problems Sister    • No Known Problems Sister          Social History     Socioeconomic History   • Marital status: Single     Spouse name: Not on file   • Number of children: Not on file   • Years of education: Not on file   •  "Highest education level: Not on file   Tobacco Use   • Smoking status: Never Smoker   • Smokeless tobacco: Never Used   Substance and Sexual Activity   • Alcohol use: No   • Drug use: No   • Sexual activity: Defer         Current Outpatient Medications   Medication Sig Dispense Refill   • acetaminophen (TYLENOL) 325 MG tablet Take 2 tablets by mouth Every 4 (Four) Hours As Needed for Mild Pain . 30 tablet 11   • amoxicillin-clavulanate (AUGMENTIN) 875-125 MG per tablet Take 1 tablet by mouth 2 (Two) Times a Day. 20 tablet 0   • busPIRone (BUSPAR) 15 MG tablet Take 15 mg by mouth 3 (Three) Times a Day.     • chlorhexidine (PERIDEX) 0.12 % solution Apply 15 mL to the mouth or throat 2 (Two) Times a Day.     • citalopram (CeleXA) 20 MG tablet Take 20 mg by mouth Daily.     • Dextromethorphan-Guaifenesin 5-100 MG/5ML liquid Take 1 teaspoon(s) by mouth 4 (Four) Times a Day As Needed (for cough). 118 mL 11   • esomeprazole (nexIUM) 40 MG capsule Take one by mouth daily at 7 am 30 capsule 11   • ferrous sulfate 325 (65 FE) MG tablet Take 1 tablet by mouth 2 (Two) Times a Day With Meals. 60 tablet 5   • hydrocortisone 1 % ointment Apply to rash or scrapes 56 g 11   • loperamide (IMODIUM) 2 MG capsule Take 1 capsule by mouth 4 (Four) Times a Day As Needed for Diarrhea. Take 1 cap after each loose stool not to exceed 8 per day prn 30 capsule 12   • neomycin-polymyxin-pramoxine (ANTIBIOTIC PLUS PAIN RELIEF) 1 % cream Apply  topically to the appropriate area as directed 4 (Four) Times a Day As Needed (prn rash or scraps). 28 g 11   • tamsulosin (FLOMAX) 0.4 MG capsule 24 hr capsule Take one capsule by mouth at 7 am daily 30 capsule 11   • thioridazine (MELLARIL) 25 MG tablet 1 tab at 7 AM, 1 tab at 12 pm and 2 tabs in 7 PM every day 210 tablet 11     No current facility-administered medications for this visit.      Review of Systems   Unable to perform ROS: Psychiatric disorder        OBJECTIVE    Pulse 76   Ht 180.3 cm (71\")  "  Wt 109 kg (240 lb)   SpO2 98%   BMI 33.47 kg/m²       Physical Exam   Constitutional: He appears well-developed and well-nourished. No distress.   HENT:   Head: Normocephalic and atraumatic.   Nose: Nose normal.   Eyes: Conjunctivae and EOM are normal. Pupils are equal, round, and reactive to light.   Pulmonary/Chest: Effort normal. No respiratory distress. He has no wheezes.   Neurological: He is alert.   Skin: Skin is warm and dry. Capillary refill takes less than 2 seconds.   Vitals reviewed.    Lower Extremity:     Cardiovascular:    DP/PT pulses palpable    CFT brisk  to all digits  No erythema or edema noted     Musculoskeletal:  Muscle strength is 5/5 for all muscle groups tested   ROM of the 1st MTP is full without pain or crepitus  ROM of the MTJ is full without pain or crepitus    ROM of the STJ is full without pain or crepitus    ROM of the ankle joint is full without pain or crepitus      Dermatological:   Nails 2-5 are are discolored and elongated. Bilateral hallux nails are thickened, discolored, elongated with subungual hematoma and debris.  there is pain on palpation to the toenails  Skin is warm, dry and intact    Webspaces 1-4 bilateral are clean, dry and intact.   No subcutaneous nodules or masses noted    Hyperkeratotic lesion noted to the plantar aspect of the left fifth metatarsal head.   hyperkeratotic lesion noted to the plantar aspect of the right foot sub-fourth metatarsal head.      Neurological:   Protective sensation intact    Sensation intact to light touch        Procedures        ASSESSMENT AND PLAN    Orlin was seen today for nail care and nail care.    Diagnoses and all orders for this visit:    Onychomycosis    Chronic toe pain, bilateral    Mental retardation      - Nails 1-5 bilateral were debrided in length and thickness with nail nipper and electric  to decrease fungal load and risk of infection.   - All questions were answered   - RTC 3 months as needed            This document has been electronically signed by Brian Cash DPM on July 12, 2019 12:58 PM     7/12/2019  12:58 PM

## 2019-08-05 DIAGNOSIS — K21.9 GASTROESOPHAGEAL REFLUX DISEASE, ESOPHAGITIS PRESENCE NOT SPECIFIED: ICD-10-CM

## 2019-08-05 RX ORDER — ESOMEPRAZOLE MAGNESIUM 40 MG/1
CAPSULE, DELAYED RELEASE ORAL
Qty: 30 CAPSULE | Refills: 11 | Status: SHIPPED | OUTPATIENT
Start: 2019-08-05 | End: 2020-03-23 | Stop reason: CLARIF

## 2019-08-05 RX ORDER — TAMSULOSIN HYDROCHLORIDE 0.4 MG/1
CAPSULE ORAL
Qty: 30 CAPSULE | Refills: 11 | Status: SHIPPED | OUTPATIENT
Start: 2019-08-05 | End: 2020-07-30 | Stop reason: SDUPTHER

## 2019-08-22 ENCOUNTER — OFFICE VISIT (OUTPATIENT)
Dept: FAMILY MEDICINE CLINIC | Facility: CLINIC | Age: 54
End: 2019-08-22

## 2019-08-22 VITALS
DIASTOLIC BLOOD PRESSURE: 71 MMHG | WEIGHT: 248 LBS | HEIGHT: 71 IN | OXYGEN SATURATION: 98 % | TEMPERATURE: 98.1 F | BODY MASS INDEX: 34.72 KG/M2 | SYSTOLIC BLOOD PRESSURE: 113 MMHG | RESPIRATION RATE: 18 BRPM | HEART RATE: 69 BPM

## 2019-08-22 DIAGNOSIS — E66.9 CLASS 1 OBESITY WITH BODY MASS INDEX (BMI) OF 34.0 TO 34.9 IN ADULT, UNSPECIFIED OBESITY TYPE, UNSPECIFIED WHETHER SERIOUS COMORBIDITY PRESENT: ICD-10-CM

## 2019-08-22 DIAGNOSIS — F84.0 AUTISM: ICD-10-CM

## 2019-08-22 DIAGNOSIS — F79 MENTAL RETARDATION: Primary | ICD-10-CM

## 2019-08-22 PROCEDURE — 99212 OFFICE O/P EST SF 10 MIN: CPT | Performed by: NURSE PRACTITIONER

## 2019-08-22 NOTE — PROGRESS NOTES
Subjective   Orlin Mendes is a 54 y.o. male.     Here today for his annual physical.  He has recently seen his mental health professional at [facility] and they have increased his celexa to 40mg.  It seems to have helped with some of his behaviors.  He has a hx of mental retardation and autism.      Depression   Visit Type: follow-up  Patient presents with the following symptoms: nervousness/anxiety and restlessness.  Patient is not experiencing: anhedonia, chest pain, choking sensation, compulsions, confusion, decreased concentration, depressed mood, dizziness, dry mouth, excessive worry, fatigue, feelings of hopelessness, feelings of worthlessness, hypersomnia, hyperventilation, impotence, insomnia, irritability, malaise, memory impairment, muscle tension, nausea, obsessions, palpitations, panic, psychomotor agitation, psychomotor retardation, shortness of breath, suicidal ideas, suicidal planning, thoughts of death, weight gain and weight loss.  Frequency of symptoms: constantly   Severity: interfering with daily activities   Sleep quality: good  Nighttime awakenings: occasional  Compliance with medications:  %             The following portions of the patient's history were reviewed and updated as appropriate: allergies, current medications, past family history, past medical history, past social history, past surgical history and problem list.    Review of Systems   Constitutional: Negative.  Negative for irritability, unexpected weight gain and unexpected weight loss.   HENT: Negative.    Eyes: Negative.    Respiratory: Negative.  Negative for choking and shortness of breath.    Cardiovascular: Negative.  Negative for palpitations.   Gastrointestinal: Negative.    Endocrine: Negative.    Genitourinary: Negative.  Negative for impotence.   Musculoskeletal: Negative.    Skin: Negative.    Allergic/Immunologic: Negative.    Neurological: Negative for confusion.   Hematological: Negative.     Psychiatric/Behavioral: Negative for decreased concentration, suicidal ideas and depressed mood. The patient is nervous/anxious. The patient does not have insomnia.        Objective   Physical Exam   Constitutional: He appears well-developed and well-nourished. No distress.   HENT:   Head: Normocephalic and atraumatic.   Right Ear: External ear normal.   Left Ear: External ear normal.   Nose: Nose normal.   Mouth/Throat: Oropharynx is clear and moist. No oropharyngeal exudate.   Eyes: Pupils are equal, round, and reactive to light.   Neck: Normal range of motion. Neck supple. No thyromegaly present.   Cardiovascular: Normal rate, regular rhythm and normal heart sounds. Exam reveals no friction rub.   No murmur heard.  Pulmonary/Chest: Effort normal and breath sounds normal. No respiratory distress. He has no wheezes. He has no rales.   Abdominal: Soft.   Musculoskeletal: Normal range of motion.   Neurological: He is alert.   Unable to determine how oriented he is.  He is able to follow simple commands and he echos much of what he hears.   Skin: Skin is warm and dry.   Psychiatric: He has a normal mood and affect. Thought content normal.   Nursing note and vitals reviewed.        Assessment/Plan   Orlin was seen today for annual exam.    Diagnoses and all orders for this visit:    Mental retardation    Class 1 obesity with body mass index (BMI) of 34.0 to 34.9 in adult, unspecified obesity type, unspecified whether serious comorbidity present  Comments:  diet and exercise info given    Autism    no changes in meds at present.

## 2019-08-23 NOTE — PATIENT INSTRUCTIONS
Calorie Counting for Weight Loss  Calories are units of energy. Your body needs a certain amount of calories from food to keep you going throughout the day. When you eat more calories than your body needs, your body stores the extra calories as fat. When you eat fewer calories than your body needs, your body burns fat to get the energy it needs.  Calorie counting means keeping track of how many calories you eat and drink each day. Calorie counting can be helpful if you need to lose weight. If you make sure to eat fewer calories than your body needs, you should lose weight. Ask your health care provider what a healthy weight is for you.  For calorie counting to work, you will need to eat the right number of calories in a day in order to lose a healthy amount of weight per week. A dietitian can help you determine how many calories you need in a day and will give you suggestions on how to reach your calorie goal.  · A healthy amount of weight to lose per week is usually 1-2 lb (0.5-0.9 kg). This usually means that your daily calorie intake should be reduced by 500-750 calories.  · Eating 1,200 - 1,500 calories per day can help most women lose weight.  · Eating 1,500 - 1,800 calories per day can help most men lose weight.  What is my plan?  My goal is to have __________ calories per day.  If I have this many calories per day, I should lose around __________ pounds per week.  What do I need to know about calorie counting?  In order to meet your daily calorie goal, you will need to:  · Find out how many calories are in each food you would like to eat. Try to do this before you eat.  · Decide how much of the food you plan to eat.  · Write down what you ate and how many calories it had. Doing this is called keeping a food log.  To successfully lose weight, it is important to balance calorie counting with a healthy lifestyle that includes regular activity. Aim for 150 minutes of moderate exercise (such as walking) or 75  minutes of vigorous exercise (such as running) each week.  Where do I find calorie information?    The number of calories in a food can be found on a Nutrition Facts label. If a food does not have a Nutrition Facts label, try to look up the calories online or ask your dietitian for help.  Remember that calories are listed per serving. If you choose to have more than one serving of a food, you will have to multiply the calories per serving by the amount of servings you plan to eat. For example, the label on a package of bread might say that a serving size is 1 slice and that there are 90 calories in a serving. If you eat 1 slice, you will have eaten 90 calories. If you eat 2 slices, you will have eaten 180 calories.  How do I keep a food log?  Immediately after each meal, record the following information in your food log:  · What you ate. Don't forget to include toppings, sauces, and other extras on the food.  · How much you ate. This can be measured in cups, ounces, or number of items.  · How many calories each food and drink had.  · The total number of calories in the meal.  Keep your food log near you, such as in a small notebook in your pocket, or use a mobile jg or website. Some programs will calculate calories for you and show you how many calories you have left for the day to meet your goal.  What are some calorie counting tips?    · Use your calories on foods and drinks that will fill you up and not leave you hungry:  ? Some examples of foods that fill you up are nuts and nut butters, vegetables, lean proteins, and high-fiber foods like whole grains. High-fiber foods are foods with more than 5 g fiber per serving.  ? Drinks such as sodas, specialty coffee drinks, alcohol, and juices have a lot of calories, yet do not fill you up.  · Eat nutritious foods and avoid empty calories. Empty calories are calories you get from foods or beverages that do not have many vitamins or protein, such as candy, sweets, and  "soda. It is better to have a nutritious high-calorie food (such as an avocado) than a food with few nutrients (such as a bag of chips).  · Know how many calories are in the foods you eat most often. This will help you calculate calorie counts faster.  · Pay attention to calories in drinks. Low-calorie drinks include water and unsweetened drinks.  · Pay attention to nutrition labels for \"low fat\" or \"fat free\" foods. These foods sometimes have the same amount of calories or more calories than the full fat versions. They also often have added sugar, starch, or salt, to make up for flavor that was removed with the fat.  · Find a way of tracking calories that works for you. Get creative. Try different apps or programs if writing down calories does not work for you.  What are some portion control tips?  · Know how many calories are in a serving. This will help you know how many servings of a certain food you can have.  · Use a measuring cup to measure serving sizes. You could also try weighing out portions on a kitchen scale. With time, you will be able to estimate serving sizes for some foods.  · Take some time to put servings of different foods on your favorite plates, bowls, and cups so you know what a serving looks like.  · Try not to eat straight from a bag or box. Doing this can lead to overeating. Put the amount you would like to eat in a cup or on a plate to make sure you are eating the right portion.  · Use smaller plates, glasses, and bowls to prevent overeating.  · Try not to multitask (for example, watch TV or use your computer) while eating. If it is time to eat, sit down at a table and enjoy your food. This will help you to know when you are full. It will also help you to be aware of what you are eating and how much you are eating.  What are tips for following this plan?  Reading food labels  · Check the calorie count compared to the serving size. The serving size may be smaller than what you are used to " eating.  · Check the source of the calories. Make sure the food you are eating is high in vitamins and protein and low in saturated and trans fats.  Shopping  · Read nutrition labels while you shop. This will help you make healthy decisions before you decide to purchase your food.  · Make a grocery list and stick to it.  Cooking  · Try to cook your favorite foods in a healthier way. For example, try baking instead of frying.  · Use low-fat dairy products.  Meal planning  · Use more fruits and vegetables. Half of your plate should be fruits and vegetables.  · Include lean proteins like poultry and fish.  How do I count calories when eating out?  · Ask for smaller portion sizes.  · Consider sharing an entree and sides instead of getting your own entree.  · If you get your own entree, eat only half. Ask for a box at the beginning of your meal and put the rest of your entree in it so you are not tempted to eat it.  · If calories are listed on the menu, choose the lower calorie options.  · Choose dishes that include vegetables, fruits, whole grains, low-fat dairy products, and lean protein.  · Choose items that are boiled, broiled, grilled, or steamed. Stay away from items that are buttered, battered, fried, or served with cream sauce. Items labeled “crispy” are usually fried, unless stated otherwise.  · Choose water, low-fat milk, unsweetened iced tea, or other drinks without added sugar. If you want an alcoholic beverage, choose a lower calorie option such as a glass of wine or light beer.  · Ask for dressings, sauces, and syrups on the side. These are usually high in calories, so you should limit the amount you eat.  · If you want a salad, choose a garden salad and ask for grilled meats. Avoid extra toppings like noble, cheese, or fried items. Ask for the dressing on the side, or ask for olive oil and vinegar or lemon to use as dressing.  · Estimate how many servings of a food you are given. For example, a serving of  cooked rice is ½ cup or about the size of half a baseball. Knowing serving sizes will help you be aware of how much food you are eating at restaurants. The list below tells you how big or small some common portion sizes are based on everyday objects:  ? 1 oz--4 stacked dice.  ? 3 oz--1 deck of cards.  ? 1 tsp--1 die.  ? 1 Tbsp--½ a ping-pong ball.  ? 2 Tbsp--1 ping-pong ball.  ? ½ cup--½ baseball.  ? 1 cup--1 baseball.  Summary  · Calorie counting means keeping track of how many calories you eat and drink each day. If you eat fewer calories than your body needs, you should lose weight.  · A healthy amount of weight to lose per week is usually 1-2 lb (0.5-0.9 kg). This usually means reducing your daily calorie intake by 500-750 calories.  · The number of calories in a food can be found on a Nutrition Facts label. If a food does not have a Nutrition Facts label, try to look up the calories online or ask your dietitian for help.  · Use your calories on foods and drinks that will fill you up, and not on foods and drinks that will leave you hungry.  · Use smaller plates, glasses, and bowls to prevent overeating.  This information is not intended to replace advice given to you by your health care provider. Make sure you discuss any questions you have with your health care provider.  Document Released: 12/18/2006 Document Revised: 11/17/2017 Document Reviewed: 11/17/2017  [employer] Patient Education © 2019 [employer]      Exercising to Lose Weight  Exercise is structured, repetitive physical activity to improve fitness and health. Getting regular exercise is important for everyone. It is especially important if you are overweight. Being overweight increases your risk of heart disease, stroke, diabetes, high blood pressure, and several types of cancer. Reducing your calorie intake and exercising can help you lose weight.  Exercise is usually categorized as moderate or vigorous intensity. To lose weight, most  people need to do a certain amount of moderate-intensity or vigorous-intensity exercise each week.  Moderate-intensity exercise    Moderate-intensity exercise is any activity that gets you moving enough to burn at least three times more energy (calories) than if you were sitting.  Examples of moderate exercise include:  · Walking a mile in 15 minutes.  · Doing light yard work.  · Biking at an easy pace.  Most people should get at least 150 minutes (2 hours and 30 minutes) a week of moderate-intensity exercise to maintain their body weight.  Vigorous-intensity exercise  Vigorous-intensity exercise is any activity that gets you moving enough to burn at least six times more calories than if you were sitting. When you exercise at this intensity, you should be working hard enough that you are not able to carry on a conversation.  Examples of vigorous exercise include:  · Running.  · Playing a team sport, such as football, basketball, and soccer.  · Jumping rope.  Most people should get at least 75 minutes (1 hour and 15 minutes) a week of vigorous-intensity exercise to maintain their body weight.  How can exercise affect me?  When you exercise enough to burn more calories than you eat, you lose weight. Exercise also reduces body fat and builds muscle. The more muscle you have, the more calories you burn. Exercise also:  · Improves mood.  · Reduces stress and tension.  · Improves your overall fitness, flexibility, and endurance.  · Increases bone strength.  The amount of exercise you need to lose weight depends on:  · Your age.  · The type of exercise.  · Any health conditions you have.  · Your overall physical ability.  Talk to your health care provider about how much exercise you need and what types of activities are safe for you.  What actions can I take to lose weight?  Nutrition    · Make changes to your diet as told by your health care provider or diet and nutrition specialist (dietitian). This may  include:  ? Eating fewer calories.  ? Eating more protein.  ? Eating less unhealthy fats.  ? Eating a diet that includes fresh fruits and vegetables, whole grains, low-fat dairy products, and lean protein.  ? Avoiding foods with added fat, salt, and sugar.  · Drink plenty of water while you exercise to prevent dehydration or heat stroke.  Activity  · Choose an activity that you enjoy and set realistic goals. Your health care provider can help you make an exercise plan that works for you.  · Exercise at a moderate or vigorous intensity most days of the week.  ? The intensity of exercise may vary from person to person. You can tell how intense a workout is for you by paying attention to your breathing and heartbeat. Most people will notice their breathing and heartbeat get faster with more intense exercise.  · Do resistance training twice each week, such as:  ? Push-ups.  ? Sit-ups.  ? Lifting weights.  ? Using resistance bands.  · Getting short amounts of exercise can be just as helpful as long structured periods of exercise. If you have trouble finding time to exercise, try to include exercise in your daily routine.  ? Get up, stretch, and walk around every 30 minutes throughout the day.  ? Go for a walk during your lunch break.  ? Park your car farther away from your destination.  ? If you take public transportation, get off one stop early and walk the rest of the way.  ? Make phone calls while standing up and walking around.  ? Take the stairs instead of elevators or escalators.  · Wear comfortable clothes and shoes with good support.  · Do not exercise so much that you hurt yourself, feel dizzy, or get very short of breath.  Where to find more information  · [place] Department of Health and Human Services: www.hhs.gov  · Centers for Disease Control and Prevention (CDC): www.cdc.gov  Contact a health care provider:  · Before starting a new exercise program.  · If you have questions or concerns about your  weight.  · If you have a medical problem that keeps you from exercising.  Get help right away if you have any of the following while exercising:  · Injury.  · Dizziness.  · Difficulty breathing or shortness of breath that does not go away when you stop exercising.  · Chest pain.  · Rapid heartbeat.  Summary  · Being overweight increases your risk of heart disease, stroke, diabetes, high blood pressure, and several types of cancer.  · Losing weight happens when you burn more calories than you eat.  · Reducing the amount of calories you eat in addition to getting regular moderate or vigorous exercise each week helps you lose weight.  This information is not intended to replace advice given to you by your health care provider. Make sure you discuss any questions you have with your health care provider.  Document Released: 01/20/2012 Document Revised: 12/31/2018 Document Reviewed: 12/31/2018  [employer] Patient Education © 2019 [employer]

## 2019-09-03 DIAGNOSIS — R50.9 FEVER, UNSPECIFIED FEVER CAUSE: ICD-10-CM

## 2019-09-03 DIAGNOSIS — R05.9 COUGH: ICD-10-CM

## 2019-09-03 DIAGNOSIS — R19.7 DIARRHEA, UNSPECIFIED TYPE: ICD-10-CM

## 2019-09-03 DIAGNOSIS — R21 RASH: ICD-10-CM

## 2019-09-03 RX ORDER — ACETAMINOPHEN 325 MG/1
650 TABLET ORAL EVERY 4 HOURS PRN
Qty: 30 TABLET | Refills: 11 | Status: SHIPPED | OUTPATIENT
Start: 2019-09-03 | End: 2020-08-25 | Stop reason: SDUPTHER

## 2019-09-03 RX ORDER — LOPERAMIDE HYDROCHLORIDE 2 MG/1
2 CAPSULE ORAL 4 TIMES DAILY PRN
Qty: 30 CAPSULE | Refills: 12 | Status: SHIPPED | OUTPATIENT
Start: 2019-09-03 | End: 2020-08-25 | Stop reason: SDUPTHER

## 2019-09-06 ENCOUNTER — TELEPHONE (OUTPATIENT)
Dept: FAMILY MEDICINE CLINIC | Facility: CLINIC | Age: 54
End: 2019-09-06

## 2019-09-06 NOTE — TELEPHONE ENCOUNTER
Trisha called wanting to know if pts physical form was ready. Can fax to [phone] or call and someone will .

## 2019-10-29 ENCOUNTER — LAB (OUTPATIENT)
Dept: LAB | Facility: HOSPITAL | Age: 54
End: 2019-10-29

## 2019-10-29 DIAGNOSIS — D50.0 IRON DEFICIENCY ANEMIA DUE TO CHRONIC BLOOD LOSS: ICD-10-CM

## 2019-10-29 DIAGNOSIS — K57.30 DIVERTICULOSIS OF LARGE INTESTINE WITHOUT HEMORRHAGE: ICD-10-CM

## 2019-10-29 PROCEDURE — 80053 COMPREHEN METABOLIC PANEL: CPT

## 2019-10-29 PROCEDURE — 85025 COMPLETE CBC W/AUTO DIFF WBC: CPT

## 2019-10-29 PROCEDURE — 83540 ASSAY OF IRON: CPT

## 2019-10-29 PROCEDURE — 84466 ASSAY OF TRANSFERRIN: CPT

## 2019-10-30 LAB
ALBUMIN SERPL-MCNC: 3.9 G/DL (ref 3.5–5.2)
ALBUMIN/GLOB SERPL: 1.2 G/DL
ALP SERPL-CCNC: 59 U/L (ref 39–117)
ALT SERPL W P-5'-P-CCNC: 16 U/L (ref 1–41)
ANION GAP SERPL CALCULATED.3IONS-SCNC: 5.2 MMOL/L (ref 5–15)
AST SERPL-CCNC: 12 U/L (ref 1–40)
BASOPHILS # BLD AUTO: 0.03 10*3/MM3 (ref 0–0.2)
BASOPHILS NFR BLD AUTO: 0.8 % (ref 0–1.5)
BILIRUB SERPL-MCNC: 0.2 MG/DL (ref 0.2–1.2)
BUN BLD-MCNC: 7 MG/DL (ref 6–20)
BUN/CREAT SERPL: 7.5 (ref 7–25)
CALCIUM SPEC-SCNC: 8.7 MG/DL (ref 8.6–10.5)
CHLORIDE SERPL-SCNC: 100 MMOL/L (ref 98–107)
CO2 SERPL-SCNC: 28.8 MMOL/L (ref 22–29)
CREAT BLD-MCNC: 0.93 MG/DL (ref 0.76–1.27)
DEPRECATED RDW RBC AUTO: 39.9 FL (ref 37–54)
EOSINOPHIL # BLD AUTO: 0.17 10*3/MM3 (ref 0–0.4)
EOSINOPHIL NFR BLD AUTO: 4.5 % (ref 0.3–6.2)
ERYTHROCYTE [DISTWIDTH] IN BLOOD BY AUTOMATED COUNT: 12.6 % (ref 12.3–15.4)
GFR SERPL CREATININE-BSD FRML MDRD: 85 ML/MIN/1.73
GLOBULIN UR ELPH-MCNC: 3.2 GM/DL
GLUCOSE BLD-MCNC: 94 MG/DL (ref 65–99)
HCT VFR BLD AUTO: 37.4 % (ref 37.5–51)
HGB BLD-MCNC: 13.4 G/DL (ref 13–17.7)
IMM GRANULOCYTES # BLD AUTO: 0.01 10*3/MM3 (ref 0–0.05)
IMM GRANULOCYTES NFR BLD AUTO: 0.3 % (ref 0–0.5)
IRON 24H UR-MRATE: 80 MCG/DL (ref 59–158)
IRON SATN MFR SERPL: 23 % (ref 20–50)
LYMPHOCYTES # BLD AUTO: 1.7 10*3/MM3 (ref 0.7–3.1)
LYMPHOCYTES NFR BLD AUTO: 44.6 % (ref 19.6–45.3)
MCH RBC QN AUTO: 31.6 PG (ref 26.6–33)
MCHC RBC AUTO-ENTMCNC: 35.8 G/DL (ref 31.5–35.7)
MCV RBC AUTO: 88.2 FL (ref 79–97)
MONOCYTES # BLD AUTO: 0.29 10*3/MM3 (ref 0.1–0.9)
MONOCYTES NFR BLD AUTO: 7.6 % (ref 5–12)
NEUTROPHILS # BLD AUTO: 1.61 10*3/MM3 (ref 1.7–7)
NEUTROPHILS NFR BLD AUTO: 42.2 % (ref 42.7–76)
NRBC BLD AUTO-RTO: 0 /100 WBC (ref 0–0.2)
PLATELET # BLD AUTO: 166 10*3/MM3 (ref 140–450)
PMV BLD AUTO: 11.5 FL (ref 6–12)
POTASSIUM BLD-SCNC: 4.1 MMOL/L (ref 3.5–5.2)
PROT SERPL-MCNC: 7.1 G/DL (ref 6–8.5)
RBC # BLD AUTO: 4.24 10*6/MM3 (ref 4.14–5.8)
SODIUM BLD-SCNC: 134 MMOL/L (ref 136–145)
TIBC SERPL-MCNC: 353 MCG/DL (ref 298–536)
TRANSFERRIN SERPL-MCNC: 237 MG/DL (ref 200–360)
WBC NRBC COR # BLD: 3.81 10*3/MM3 (ref 3.4–10.8)

## 2019-11-01 ENCOUNTER — OFFICE VISIT (OUTPATIENT)
Dept: PODIATRY | Facility: CLINIC | Age: 54
End: 2019-11-01

## 2019-11-01 VITALS — OXYGEN SATURATION: 99 % | HEIGHT: 71 IN | HEART RATE: 74 BPM | BODY MASS INDEX: 34.72 KG/M2 | WEIGHT: 248 LBS

## 2019-11-01 DIAGNOSIS — M79.674 CHRONIC TOE PAIN, BILATERAL: ICD-10-CM

## 2019-11-01 DIAGNOSIS — G89.29 CHRONIC TOE PAIN, BILATERAL: ICD-10-CM

## 2019-11-01 DIAGNOSIS — B35.1 ONYCHOMYCOSIS: Primary | ICD-10-CM

## 2019-11-01 DIAGNOSIS — M79.675 CHRONIC TOE PAIN, BILATERAL: ICD-10-CM

## 2019-11-01 PROCEDURE — 11721 DEBRIDE NAIL 6 OR MORE: CPT | Performed by: PODIATRIST

## 2019-11-01 NOTE — PROGRESS NOTES
Orlin Iyercourtney  1965  54 y.o. male     Patient presents today with a care giver for routine foot care.     11/1/2019  Chief Complaint   Patient presents with   • Left Foot - foot care   • Right Foot - foot care       History of Present Illness    Patient presents to clinic today for routine footcare.  He is accompanied by his caregiver.  Patient relates to painful, elongated and discolored toenails.  Pain is relieved debridement.      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 35-49) 01/21/2016   • Onychomycosis          Past Surgical History:   Procedure Laterality Date   • COLONOSCOPY     • COLONOSCOPY N/A 2/16/2018    Procedure: COLONOSCOPY;  Surgeon: Jus Aguilar MD;  Location: [facility] ENDOSCOPY;  Service:    • ENDOSCOPY N/A 10/27/2017    Procedure: ESOPHAGOGASTRODUODENOSCOPY;  Surgeon: Jus Aguilar MD;  Location: [facility] ENDOSCOPY;  Service:    • UPPER GASTROINTESTINAL ENDOSCOPY     • UPPER GASTROINTESTINAL ENDOSCOPY  10/27/2017         Family History   Problem Relation Age of Onset   • Heart disease Mother    • Diabetes Mother    • Heart disease Father    • Heart disease Sister    • No Known Problems Brother    • No Known Problems Maternal Grandmother    • No Known Problems Maternal Grandfather    • No Known Problems Paternal Grandmother    • No Known Problems Paternal Grandfather    • No Known Problems Sister    • No Known Problems Sister          Social History     Socioeconomic History   • Marital status: Single     Spouse name: Not on file   • Number of children: Not on file   • Years of education: Not on file   •  "Highest education level: Not on file   Tobacco Use   • Smoking status: Never Smoker   • Smokeless tobacco: Never Used   Substance and Sexual Activity   • Alcohol use: No   • Drug use: No   • Sexual activity: Defer         Current Outpatient Medications   Medication Sig Dispense Refill   • acetaminophen (TYLENOL) 325 MG tablet Take 2 tablets by mouth Every 4 (Four) Hours As Needed for Mild Pain . 30 tablet 11   • busPIRone (BUSPAR) 15 MG tablet Take 15 mg by mouth 3 (Three) Times a Day.     • chlorhexidine (PERIDEX) 0.12 % solution Apply 15 mL to the mouth or throat 2 (Two) Times a Day.     • citalopram (CeleXA) 40 MG tablet Take 40 mg by mouth Daily.     • Dextromethorphan-guaiFENesin 5-100 MG/5ML liquid Take 1 teaspoon(s) by mouth 4 (Four) Times a Day As Needed (for cough). 118 mL 11   • esomeprazole (nexIUM) 40 MG capsule Take one by mouth daily at 7 am 30 capsule 11   • ferrous sulfate 325 (65 FE) MG tablet Take 1 tablet by mouth 2 (Two) Times a Day With Meals. 60 tablet 5   • hydrocortisone 1 % ointment Apply to rash or scrapes 56 g 11   • loperamide (IMODIUM) 2 MG capsule Take 1 capsule by mouth 4 (Four) Times a Day As Needed for Diarrhea. Take 1 cap after each loose stool not to exceed 8 per day prn 30 capsule 12   • neomycin-polymyxin-pramoxine (ANTIBIOTIC PLUS PAIN RELIEF) 1 % cream Apply  topically to the appropriate area as directed 4 (Four) Times a Day As Needed (prn rash or scraps). 28 g 11   • tamsulosin (FLOMAX) 0.4 MG capsule 24 hr capsule Take one capsule by mouth at 7 am daily 30 capsule 11   • thioridazine (MELLARIL) 25 MG tablet 1 tab at 7 AM, 1 tab at 12 pm and 2 tabs in 7 PM every day 210 tablet 11     No current facility-administered medications for this visit.      Review of Systems   Unable to perform ROS: Psychiatric disorder        OBJECTIVE    Pulse 74   Ht 180.3 cm (71\")   Wt 112 kg (248 lb)   SpO2 99%   BMI 34.59 kg/m²       Physical Exam   Constitutional: He appears well-developed " and well-nourished. No distress.   HENT:   Head: Normocephalic and atraumatic.   Nose: Nose normal.   Eyes: EOM are normal. Pupils are equal, round, and reactive to light.   Pulmonary/Chest: Effort normal. No respiratory distress. He has no wheezes.   Neurological: He is alert.   Skin: Skin is warm and dry. Capillary refill takes less than 2 seconds.   Vitals reviewed.    Lower Extremity:     Cardiovascular:    DP/PT pulses palpable    CFT brisk  to all digits  No erythema or edema noted     Musculoskeletal:  Muscle strength is 5/5 for all muscle groups tested   ROM of the 1st MTP is full without pain or crepitus  ROM of the MTJ is full without pain or crepitus    ROM of the STJ is full without pain or crepitus    ROM of the ankle joint is full without pain or crepitus      Dermatological:   Nails 2-5 are are discolored and elongated. Bilateral hallux nails are thickened, discolored, elongated with subungual hematoma and debris.  there is pain on palpation to the toenails  Skin is warm, dry and intact    Webspaces 1-4 bilateral are clean, dry and intact.   No subcutaneous nodules or masses noted    Hyperkeratotic lesion noted to the plantar aspect of the left fifth metatarsal head.   hyperkeratotic lesion noted to the plantar aspect of the right foot sub-fourth metatarsal head.      Neurological:   Protective sensation intact    Sensation intact to light touch        Procedures        ASSESSMENT AND PLAN    Orlin was seen today for foot care and foot care.    Diagnoses and all orders for this visit:    Onychomycosis    Chronic toe pain, bilateral      - Nails 1-5 bilateral were debrided in length and thickness with nail nipper and electric  to decrease fungal load and risk of infection.   - All questions were answered   - RTC 3 months as needed           This document has been electronically signed by Brian Cash DPM on November 1, 2019 11:36 AM     11/1/2019  11:36 AM

## 2019-11-05 ENCOUNTER — OFFICE VISIT (OUTPATIENT)
Dept: GASTROENTEROLOGY | Facility: CLINIC | Age: 54
End: 2019-11-05

## 2019-11-05 VITALS
DIASTOLIC BLOOD PRESSURE: 64 MMHG | BODY MASS INDEX: 34.27 KG/M2 | HEIGHT: 71 IN | SYSTOLIC BLOOD PRESSURE: 110 MMHG | OXYGEN SATURATION: 98 % | HEART RATE: 66 BPM | WEIGHT: 244.8 LBS

## 2019-11-05 DIAGNOSIS — K21.00 GASTROESOPHAGEAL REFLUX DISEASE WITH ESOPHAGITIS: ICD-10-CM

## 2019-11-05 DIAGNOSIS — R10.84 GENERALIZED ABDOMINAL PAIN: ICD-10-CM

## 2019-11-05 DIAGNOSIS — D50.0 IRON DEFICIENCY ANEMIA DUE TO CHRONIC BLOOD LOSS: Primary | ICD-10-CM

## 2019-11-05 DIAGNOSIS — K57.30 DIVERTICULOSIS OF LARGE INTESTINE WITHOUT HEMORRHAGE: ICD-10-CM

## 2019-11-05 PROCEDURE — 99213 OFFICE O/P EST LOW 20 MIN: CPT | Performed by: PHYSICIAN ASSISTANT

## 2019-11-05 NOTE — PROGRESS NOTES
Chief Complaint   Patient presents with   • Anemia   • Heartburn   • Diverticulosis       ENDO PROCEDURE ORDERED:    Subjective    Orlin Mendes is a 54 y.o. male. he is here today for follow-up.    History of Present Illness    Patient is seen on a recheck of his GERD, anemia, diverticulosis. Last seen 05/07/2019. He was accompanied by a caretaker. He is currently doing well on the Nexium 40 mg daily. No nausea, vomiting or dysphagia. He does take Imodium as needed for diarrhea. No blood in the stool. Weight is up 7.8 pounds since last visit. She states she is not always the primary caretaker and the others are feeding him what sounds like more junk food but he has been eating well. Last colonoscopy showed diverticulosis on 02/16/2018.     Laboratory on 10/29/2019, iron studies were normal, CBC normal. CMP showed sodium 134; otherwise normal.     ASSESSMENT/PLAN:  Patient with history of iron deficiency anemia, appears to have resolved. May discontinue the iron. GERD is doing well on the Nexium. We will continue that. He appears stable overall and I have recommended followup in 6 months with CBC, CMP, serum iron prior. Further pending clinical course and the results of the above.       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 35-49) 01/21/2016   • Onychomycosis      Past Surgical History:   Procedure Laterality Date   • COLONOSCOPY     • COLONOSCOPY N/A 2/16/2018    Procedure: COLONOSCOPY;  Surgeon: Jus Aguilar MD;  Location: [facility]  ENDOSCOPY;  Service:    • ENDOSCOPY N/A 10/27/2017    Procedure: ESOPHAGOGASTRODUODENOSCOPY;  Surgeon: Jus Aguilar MD;  Location: [facility] ENDOSCOPY;  Service:    • UPPER GASTROINTESTINAL ENDOSCOPY     • UPPER GASTROINTESTINAL ENDOSCOPY  10/27/2017     Family History   Problem Relation Age of Onset   • Heart disease Mother    • Diabetes Mother    • Heart disease Father    • Heart disease Sister    • No Known Problems Brother    • No Known Problems Maternal Grandmother    • No Known Problems Maternal Grandfather    • No Known Problems Paternal Grandmother    • No Known Problems Paternal Grandfather    • No Known Problems Sister    • No Known Problems Sister        No Known Allergies  Social History     Socioeconomic History   • Marital status: Single     Spouse name: Not on file   • Number of children: Not on file   • Years of education: Not on file   • Highest education level: Not on file   Tobacco Use   • Smoking status: Never Smoker   • Smokeless tobacco: Never Used   Substance and Sexual Activity   • Alcohol use: No   • Drug use: No   • Sexual activity: Defer     Current Medications:  Prior to Admission medications    Medication Sig Start Date End Date Taking? Authorizing Provider   acetaminophen (TYLENOL) 325 MG tablet Take 2 tablets by mouth Every 4 (Four) Hours As Needed for Mild Pain . 9/3/19  Yes Coleen Peraza APRN   busPIRone (BUSPAR) 15 MG tablet Take 15 mg by mouth 3 (Three) Times a Day.   Yes ProviderAlessandra MD   chlorhexidine (PERIDEX) 0.12 % solution Apply 15 mL to the mouth or throat 2 (Two) Times a Day.   Yes Alessandra Conway MD   citalopram (CeleXA) 40 MG tablet Take 40 mg by mouth Daily.   Yes ProviderAlessandra MD   Dextromethorphan-guaiFENesin 5-100 MG/5ML liquid Take 1 teaspoon(s) by mouth 4 (Four) Times a Day As Needed (for cough). 9/3/19  Yes Coleen Peraza APRN   esomeprazole (nexIUM) 40 MG capsule Take one by mouth daily at 7 am 8/5/19  Yes Coleen Peraza APRN  "  ferrous sulfate 325 (65 FE) MG tablet Take 1 tablet by mouth 2 (Two) Times a Day With Meals. 5/7/19  Yes Bhargav Foreman PA-C   hydrocortisone 1 % ointment Apply to rash or scrapes 9/14/18  Yes Coleen Peraza APRN   loperamide (IMODIUM) 2 MG capsule Take 1 capsule by mouth 4 (Four) Times a Day As Needed for Diarrhea. Take 1 cap after each loose stool not to exceed 8 per day prn 9/3/19  Yes Coleen Peraza APRN   neomycin-polymyxin-pramoxine (ANTIBIOTIC PLUS PAIN RELIEF) 1 % cream Apply  topically to the appropriate area as directed 4 (Four) Times a Day As Needed (prn rash or scraps). 9/3/19  Yes Coleen Peraza APRN   tamsulosin (FLOMAX) 0.4 MG capsule 24 hr capsule Take one capsule by mouth at 7 am daily 8/5/19  Yes Coleen Peraza APRN   thioridazine (MELLARIL) 25 MG tablet 1 tab at 7 AM, 1 tab at 12 pm and 2 tabs in 7 PM every day 1/17/19  Yes Coleen Peraza APRN     Review of Systems  Review of Systems       Objective    /64 (BP Location: Left arm, Patient Position: Sitting)   Pulse 66   Ht 180.3 cm (71\")   Wt 111 kg (244 lb 12.8 oz)   SpO2 98%   BMI 34.14 kg/m²   Physical Exam   Constitutional: He is oriented to person, place, and time. He appears well-developed and well-nourished. No distress.   HENT:   Head: Normocephalic and atraumatic.   Eyes: EOM are normal. Pupils are equal, round, and reactive to light.   Neck: Normal range of motion.   Cardiovascular: Normal rate, regular rhythm and normal heart sounds.   Pulmonary/Chest: Effort normal and breath sounds normal.   Abdominal: Soft. Bowel sounds are normal. He exhibits no shifting dullness, no distension, no abdominal bruit, no ascites and no mass. There is no hepatosplenomegaly. There is tenderness. There is no rigidity, no rebound, no guarding and no CVA tenderness. No hernia. Hernia confirmed negative in the ventral area.   Musculoskeletal: Normal range of motion.   Neurological: He is alert and oriented to person, place, " and time.   Skin: Skin is warm and dry.   Psychiatric: He has a normal mood and affect. His behavior is normal. Judgment and thought content normal.   Nursing note and vitals reviewed.    Assessment/Plan      1. Iron deficiency anemia due to chronic blood loss    2. Gastroesophageal reflux disease with esophagitis    3. Diverticulosis of large intestine without hemorrhage    4. Generalized abdominal pain    .   Orlin was seen today for anemia, heartburn and diverticulosis.    Diagnoses and all orders for this visit:    Iron deficiency anemia due to chronic blood loss  -     CBC Auto Differential; Future  -     Comprehensive Metabolic Panel; Future  -     Iron and TIBC; Future  -     Ferritin; Future    Gastroesophageal reflux disease with esophagitis  -     CBC Auto Differential; Future  -     Comprehensive Metabolic Panel; Future  -     Iron and TIBC; Future  -     Ferritin; Future    Diverticulosis of large intestine without hemorrhage  -     CBC Auto Differential; Future  -     Comprehensive Metabolic Panel; Future  -     Iron and TIBC; Future  -     Ferritin; Future    Generalized abdominal pain  -     CBC Auto Differential; Future  -     Comprehensive Metabolic Panel; Future  -     Iron and TIBC; Future  -     Ferritin; Future        Orders placed during this encounter include:  Orders Placed This Encounter   Procedures   • CBC Auto Differential     Due before follow up in May     Standing Status:   Future     Standing Expiration Date:   5/30/2020   • Comprehensive Metabolic Panel     Due before follow up in May     Standing Status:   Future     Standing Expiration Date:   5/30/2020   • Iron and TIBC     Due before follow up in May     Standing Status:   Future     Standing Expiration Date:   5/30/2020   • Ferritin     Due before follow up in May     Standing Status:   Future     Standing Expiration Date:   5/30/2020       Medications prescribed:  No orders of the defined types were placed in this  encounter.      Requested Prescriptions      No prescriptions requested or ordered in this encounter       Review and/or summary of lab tests, radiology, procedures, medications. Review and summary of old records and obtaining of history. The risks and benefits of my recommendations, as well as other treatment options were discussed with the patient today. Questions were answered.    Follow-up: Return in about 6 months (around 5/5/2020), or if symptoms worsen or fail to improve.     * Surgery not found *      This document has been electronically signed by Bhargav Foreman PA-C on November 6, 2019 7:07 PM      Results for orders placed or performed in visit on 10/29/19   CBC Auto Differential   Result Value Ref Range    WBC 3.81 3.40 - 10.80 10*3/mm3    RBC 4.24 4.14 - 5.80 10*6/mm3    Hemoglobin 13.4 13.0 - 17.7 g/dL    Hematocrit 37.4 (L) 37.5 - 51.0 %    MCV 88.2 79.0 - 97.0 fL    MCH 31.6 26.6 - 33.0 pg    MCHC 35.8 (H) 31.5 - 35.7 g/dL    RDW 12.6 12.3 - 15.4 %    RDW-SD 39.9 37.0 - 54.0 fl    MPV 11.5 6.0 - 12.0 fL    Platelets 166 140 - 450 10*3/mm3    Neutrophil % 42.2 (L) 42.7 - 76.0 %    Lymphocyte % 44.6 19.6 - 45.3 %    Monocyte % 7.6 5.0 - 12.0 %    Eosinophil % 4.5 0.3 - 6.2 %    Basophil % 0.8 0.0 - 1.5 %    Immature Grans % 0.3 0.0 - 0.5 %    Neutrophils, Absolute 1.61 (L) 1.70 - 7.00 10*3/mm3    Lymphocytes, Absolute 1.70 0.70 - 3.10 10*3/mm3    Monocytes, Absolute 0.29 0.10 - 0.90 10*3/mm3    Eosinophils, Absolute 0.17 0.00 - 0.40 10*3/mm3    Basophils, Absolute 0.03 0.00 - 0.20 10*3/mm3    Immature Grans, Absolute 0.01 0.00 - 0.05 10*3/mm3    nRBC 0.0 0.0 - 0.2 /100 WBC   Iron Profile   Result Value Ref Range    Iron 80 59 - 158 mcg/dL    Iron Saturation 23 20 - 50 %    Transferrin 237 200 - 360 mg/dL    TIBC 353 298 - 536 mcg/dL   Comprehensive Metabolic Panel   Result Value Ref Range    Glucose 94 65 - 99 mg/dL    BUN 7 6 - 20 mg/dL    Creatinine 0.93 0.76 - 1.27 mg/dL    Sodium 134 (L) 136 -  145 mmol/L    Potassium 4.1 3.5 - 5.2 mmol/L    Chloride 100 98 - 107 mmol/L    CO2 28.8 22.0 - 29.0 mmol/L    Calcium 8.7 8.6 - 10.5 mg/dL    Total Protein 7.1 6.0 - 8.5 g/dL    Albumin 3.90 3.50 - 5.20 g/dL    ALT (SGPT) 16 1 - 41 U/L    AST (SGOT) 12 1 - 40 U/L    Alkaline Phosphatase 59 39 - 117 U/L    Total Bilirubin 0.2 0.2 - 1.2 mg/dL    eGFR Non African Amer 85 >60 mL/min/1.73    Globulin 3.2 gm/dL    A/G Ratio 1.2 g/dL    BUN/Creatinine Ratio 7.5 7.0 - 25.0    Anion Gap 5.2 5.0 - 15.0 mmol/L   Results for orders placed or performed in visit on 04/29/19   Urinalysis, Microscopic Only - Urine, Clean Catch   Result Value Ref Range    RBC, UA 0-2 None Seen, 0-2 /HPF    WBC, UA 0-2 None Seen, 0-2 /HPF    Bacteria, UA None Seen None Seen /HPF    Squamous Epithelial Cells, UA None Seen None Seen, 0-2 /HPF    Hyaline Casts, UA None Seen None Seen /LPF    Methodology Automated Microscopy    CBC Auto Differential   Result Value Ref Range    WBC 3.64 3.40 - 10.80 10*3/mm3    RBC 4.55 4.14 - 5.80 10*6/mm3    Hemoglobin 13.8 13.0 - 17.7 g/dL    Hematocrit 41.5 37.5 - 51.0 %    MCV 91.2 79.0 - 97.0 fL    MCH 30.3 26.6 - 33.0 pg    MCHC 33.3 31.5 - 35.7 g/dL    RDW 12.1 (L) 12.3 - 15.4 %    RDW-SD 39.9 37.0 - 54.0 fl    MPV 12.3 (H) 6.0 - 12.0 fL    Platelets 146 140 - 450 10*3/mm3    Neutrophil % 66.5 42.7 - 76.0 %    Lymphocyte % 19.2 (L) 19.6 - 45.3 %    Monocyte % 12.4 (H) 5.0 - 12.0 %    Eosinophil % 1.1 0.3 - 6.2 %    Basophil % 0.5 0.0 - 1.5 %    Immature Grans % 0.3 0.0 - 0.5 %    Neutrophils, Absolute 2.42 1.70 - 7.00 10*3/mm3    Lymphocytes, Absolute 0.70 0.70 - 3.10 10*3/mm3    Monocytes, Absolute 0.45 0.10 - 0.90 10*3/mm3    Eosinophils, Absolute 0.04 0.00 - 0.40 10*3/mm3    Basophils, Absolute 0.02 0.00 - 0.20 10*3/mm3    Immature Grans, Absolute 0.01 0.00 - 0.05 10*3/mm3    nRBC 0.0 0.0 - 0.2 /100 WBC   CBC Auto Differential   Result Value Ref Range    WBC 4.64 3.40 - 10.80 10*3/mm3    RBC 4.40 4.14 - 5.80  10*6/mm3    Hemoglobin 13.4 13.0 - 17.7 g/dL    Hematocrit 39.6 37.5 - 51.0 %    MCV 90.0 79.0 - 97.0 fL    MCH 30.5 26.6 - 33.0 pg    MCHC 33.8 31.5 - 35.7 g/dL    RDW 11.9 (L) 12.3 - 15.4 %    RDW-SD 38.5 37.0 - 54.0 fl    MPV 12.2 (H) 6.0 - 12.0 fL    Platelets 153 140 - 450 10*3/mm3    Neutrophil % 51.3 42.7 - 76.0 %    Lymphocyte % 37.9 19.6 - 45.3 %    Monocyte % 7.8 5.0 - 12.0 %    Eosinophil % 2.6 0.3 - 6.2 %    Basophil % 0.4 0.0 - 1.5 %    Immature Grans % 0.0 0.0 - 0.5 %    Neutrophils, Absolute 2.38 1.70 - 7.00 10*3/mm3    Lymphocytes, Absolute 1.76 0.70 - 3.10 10*3/mm3    Monocytes, Absolute 0.36 0.10 - 0.90 10*3/mm3    Eosinophils, Absolute 0.12 0.00 - 0.40 10*3/mm3    Basophils, Absolute 0.02 0.00 - 0.20 10*3/mm3    Immature Grans, Absolute 0.00 0.00 - 0.05 10*3/mm3    nRBC 0.0 0.0 - 0.2 /100 WBC     *Note: Due to a large number of results and/or encounters for the requested time period, some results have not been displayed. A complete set of results can be found in Results Review.       Some portions of this note have been dictated using voice recognition software and may contain errors and/or omissions.

## 2019-11-05 NOTE — PATIENT INSTRUCTIONS

## 2020-01-13 DIAGNOSIS — F41.9 ANXIETY: ICD-10-CM

## 2020-01-13 RX ORDER — THIORIDAZINE HYDROCHLORIDE 25 MG/1
TABLET, FILM COATED ORAL
Qty: 210 TABLET | Refills: 11 | Status: SHIPPED | OUTPATIENT
Start: 2020-01-13 | End: 2021-01-08 | Stop reason: SDUPTHER

## 2020-02-14 ENCOUNTER — OFFICE VISIT (OUTPATIENT)
Dept: PODIATRY | Facility: CLINIC | Age: 55
End: 2020-02-14

## 2020-02-14 VITALS — HEIGHT: 71 IN | BODY MASS INDEX: 34.16 KG/M2 | WEIGHT: 244 LBS | OXYGEN SATURATION: 99 % | HEART RATE: 72 BPM

## 2020-02-14 DIAGNOSIS — M79.675 CHRONIC TOE PAIN, BILATERAL: ICD-10-CM

## 2020-02-14 DIAGNOSIS — M79.674 CHRONIC TOE PAIN, BILATERAL: ICD-10-CM

## 2020-02-14 DIAGNOSIS — B35.1 ONYCHOMYCOSIS: Primary | ICD-10-CM

## 2020-02-14 DIAGNOSIS — G89.29 CHRONIC TOE PAIN, BILATERAL: ICD-10-CM

## 2020-02-14 PROCEDURE — 11721 DEBRIDE NAIL 6 OR MORE: CPT | Performed by: PODIATRIST

## 2020-02-14 NOTE — PROGRESS NOTES
Orlin De La Rosa Vaughn  1965  54 y.o. male     Patient presents today with a care giver for routine foot care.     2/15/2020  Chief Complaint   Patient presents with   • Left Foot - nail care   • Right Foot - nail care       History of Present Illness    Patient presents to clinic today for routine footcare.         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 35-49) 01/21/2016   • Onychomycosis          Past Surgical History:   Procedure Laterality Date   • COLONOSCOPY     • COLONOSCOPY N/A 2/16/2018    Procedure: COLONOSCOPY;  Surgeon: Jus Aguilar MD;  Location: [facility] ENDOSCOPY;  Service:    • ENDOSCOPY N/A 10/27/2017    Procedure: ESOPHAGOGASTRODUODENOSCOPY;  Surgeon: Jus Aguilar MD;  Location: [facility] ENDOSCOPY;  Service:    • UPPER GASTROINTESTINAL ENDOSCOPY     • UPPER GASTROINTESTINAL ENDOSCOPY  10/27/2017         Family History   Problem Relation Age of Onset   • Heart disease Mother    • Diabetes Mother    • Heart disease Father    • Heart disease Sister    • No Known Problems Brother    • No Known Problems Maternal Grandmother    • No Known Problems Maternal Grandfather    • No Known Problems Paternal Grandmother    • No Known Problems Paternal Grandfather    • No Known Problems Sister    • No Known Problems Sister          Social History     Socioeconomic History   • Marital status: Single     Spouse name: Not on file   • Number of children: Not on file   • Years of education: Not on file   • Highest education level: Not on file   Tobacco Use   • Smoking status: Never Smoker   • Smokeless tobacco: Never Used   Substance and  "Sexual Activity   • Alcohol use: No   • Drug use: No   • Sexual activity: Defer         Current Outpatient Medications   Medication Sig Dispense Refill   • acetaminophen (TYLENOL) 325 MG tablet Take 2 tablets by mouth Every 4 (Four) Hours As Needed for Mild Pain . 30 tablet 11   • busPIRone (BUSPAR) 15 MG tablet Take 15 mg by mouth 3 (Three) Times a Day.     • chlorhexidine (PERIDEX) 0.12 % solution Apply 15 mL to the mouth or throat 2 (Two) Times a Day.     • citalopram (CeleXA) 40 MG tablet Take 40 mg by mouth Daily.     • Dextromethorphan-guaiFENesin 5-100 MG/5ML liquid Take 1 teaspoon(s) by mouth 4 (Four) Times a Day As Needed (for cough). 118 mL 11   • esomeprazole (nexIUM) 40 MG capsule Take one by mouth daily at 7 am 30 capsule 11   • ferrous sulfate 325 (65 FE) MG tablet Take 1 tablet by mouth 2 (Two) Times a Day With Meals. 60 tablet 5   • hydrocortisone 1 % ointment Apply to rash or scrapes 56 g 11   • loperamide (IMODIUM) 2 MG capsule Take 1 capsule by mouth 4 (Four) Times a Day As Needed for Diarrhea. Take 1 cap after each loose stool not to exceed 8 per day prn 30 capsule 12   • neomycin-polymyxin-pramoxine (ANTIBIOTIC PLUS PAIN RELIEF) 1 % cream Apply  topically to the appropriate area as directed 4 (Four) Times a Day As Needed (prn rash or scraps). 28 g 11   • tamsulosin (FLOMAX) 0.4 MG capsule 24 hr capsule Take one capsule by mouth at 7 am daily 30 capsule 11   • thioridazine (MELLARIL) 25 MG tablet 1 tab at 7 AM, 1 tab at 12 pm and 2 tabs in 7 PM every day 210 tablet 11     No current facility-administered medications for this visit.      Review of Systems   Unable to perform ROS: Psychiatric disorder        OBJECTIVE    Pulse 72   Ht 180.3 cm (71\")   Wt 111 kg (244 lb)   SpO2 99%   BMI 34.03 kg/m²       Physical Exam   Constitutional: He appears well-developed and well-nourished. No distress.   HENT:   Head: Normocephalic and atraumatic.   Nose: Nose normal.   Eyes: Pupils are equal, round, " and reactive to light. EOM are normal.   Pulmonary/Chest: Effort normal. No respiratory distress. He has no wheezes.   Neurological: He is alert.   Skin: Skin is warm and dry. Capillary refill takes less than 2 seconds.   Vitals reviewed.    Lower Extremity:     Cardiovascular:    DP/PT pulses palpable    CFT brisk  to all digits  No erythema or edema noted     Musculoskeletal:  Muscle strength is 5/5 for all muscle groups tested   ROM of the 1st MTP is full without pain or crepitus  ROM of the MTJ is full without pain or crepitus    ROM of the STJ is full without pain or crepitus    ROM of the ankle joint is full without pain or crepitus      Dermatological:   Nails 2-5 are are discolored and elongated. Bilateral hallux nails are thickened, discolored, elongated with subungual hematoma and debris.  there is pain on palpation to the toenails  Skin is warm, dry and intact    Webspaces 1-4 bilateral are clean, dry and intact.   No subcutaneous nodules or masses noted    Hyperkeratotic lesion noted to the plantar aspect of the left fifth metatarsal head.   hyperkeratotic lesion noted to the plantar aspect of the right foot sub-fourth metatarsal head.      Neurological:   Protective sensation intact    Sensation intact to light touch        Procedures        ASSESSMENT AND PLAN    Orlin was seen today for nail care and nail care.    Diagnoses and all orders for this visit:    Onychomycosis    Chronic toe pain, bilateral      - Nails 1-5 bilateral were debrided in length and thickness with nail nipper and electric  to decrease fungal load and risk of infection.   - All questions were answered   - RTC 3 months as needed           This document has been electronically signed by Brian Cash DPM on February 15, 2020 7:33 AM     2/15/2020  7:33 AM

## 2020-02-21 ENCOUNTER — OFFICE VISIT (OUTPATIENT)
Dept: FAMILY MEDICINE CLINIC | Facility: CLINIC | Age: 55
End: 2020-02-21

## 2020-02-21 VITALS
HEIGHT: 71 IN | TEMPERATURE: 97.2 F | BODY MASS INDEX: 35.28 KG/M2 | DIASTOLIC BLOOD PRESSURE: 77 MMHG | WEIGHT: 252 LBS | RESPIRATION RATE: 20 BRPM | HEART RATE: 63 BPM | OXYGEN SATURATION: 95 % | SYSTOLIC BLOOD PRESSURE: 122 MMHG

## 2020-02-21 DIAGNOSIS — K21.00 GASTROESOPHAGEAL REFLUX DISEASE WITH ESOPHAGITIS: Primary | ICD-10-CM

## 2020-02-21 DIAGNOSIS — F41.9 ANXIETY: ICD-10-CM

## 2020-02-21 DIAGNOSIS — E66.01 MORBIDLY OBESE (HCC): ICD-10-CM

## 2020-02-21 DIAGNOSIS — F79 INTELLECTUAL DISABILITY: ICD-10-CM

## 2020-02-21 PROCEDURE — 99213 OFFICE O/P EST LOW 20 MIN: CPT | Performed by: NURSE PRACTITIONER

## 2020-03-02 PROBLEM — E66.01 MORBIDLY OBESE (HCC): Status: ACTIVE | Noted: 2019-05-06

## 2020-03-23 ENCOUNTER — TELEPHONE (OUTPATIENT)
Dept: FAMILY MEDICINE CLINIC | Facility: CLINIC | Age: 55
End: 2020-03-23

## 2020-03-23 DIAGNOSIS — K21.9 GASTROESOPHAGEAL REFLUX DISEASE, ESOPHAGITIS PRESENCE NOT SPECIFIED: Primary | ICD-10-CM

## 2020-03-23 RX ORDER — OMEPRAZOLE 20 MG/1
20 CAPSULE, DELAYED RELEASE ORAL DAILY
Qty: 30 CAPSULE | Refills: 11 | Status: SHIPPED | OUTPATIENT
Start: 2020-03-23 | End: 2020-03-23

## 2020-03-23 RX ORDER — OMEPRAZOLE 20 MG/1
20 CAPSULE, DELAYED RELEASE ORAL DAILY
Qty: 30 CAPSULE | Refills: 11 | Status: SHIPPED | OUTPATIENT
Start: 2020-03-23 | End: 2021-02-22 | Stop reason: SDUPTHER

## 2020-03-24 ENCOUNTER — TELEPHONE (OUTPATIENT)
Dept: FAMILY MEDICINE CLINIC | Facility: CLINIC | Age: 55
End: 2020-03-24

## 2020-03-24 NOTE — TELEPHONE ENCOUNTER
Trisha from [facility] called to let know received omperazole. Now needs order to d/c nexium please. Please fax to the office.

## 2020-07-21 ENCOUNTER — LAB (OUTPATIENT)
Dept: LAB | Facility: HOSPITAL | Age: 55
End: 2020-07-21

## 2020-07-21 ENCOUNTER — TELEPHONE (OUTPATIENT)
Dept: GASTROENTEROLOGY | Facility: CLINIC | Age: 55
End: 2020-07-21

## 2020-07-21 PROCEDURE — 83540 ASSAY OF IRON: CPT | Performed by: PHYSICIAN ASSISTANT

## 2020-07-21 PROCEDURE — 82728 ASSAY OF FERRITIN: CPT | Performed by: PHYSICIAN ASSISTANT

## 2020-07-21 PROCEDURE — 80053 COMPREHEN METABOLIC PANEL: CPT | Performed by: PHYSICIAN ASSISTANT

## 2020-07-21 PROCEDURE — 85025 COMPLETE CBC W/AUTO DIFF WBC: CPT | Performed by: PHYSICIAN ASSISTANT

## 2020-07-21 NOTE — TELEPHONE ENCOUNTER
A voicemail has been left to make Clarissa aware that his lab orders were  however I have placed a new order for those to be drawn.

## 2020-07-21 NOTE — TELEPHONE ENCOUNTER
----- Message from Radha Morrell sent at 7/21/2020  1:59 PM CDT -----  Cee with [facility] called and left voicemail about patient labs don't know if they were not in computer or not??? Was this morning.just now getting to take them off sorry

## 2020-07-22 LAB
ALBUMIN SERPL-MCNC: 4.1 G/DL (ref 3.5–5.2)
ALBUMIN/GLOB SERPL: 1.4 G/DL
ALP SERPL-CCNC: 57 U/L (ref 39–117)
ALT SERPL W P-5'-P-CCNC: 17 U/L (ref 1–41)
ANION GAP SERPL CALCULATED.3IONS-SCNC: 8.3 MMOL/L (ref 5–15)
AST SERPL-CCNC: 14 U/L (ref 1–40)
BASOPHILS # BLD AUTO: 0.01 10*3/MM3 (ref 0–0.2)
BASOPHILS NFR BLD AUTO: 0.2 % (ref 0–1.5)
BILIRUB SERPL-MCNC: 0.2 MG/DL (ref 0–1.2)
BUN SERPL-MCNC: 10 MG/DL (ref 6–20)
BUN/CREAT SERPL: 9.6 (ref 7–25)
CALCIUM SPEC-SCNC: 9.4 MG/DL (ref 8.6–10.5)
CHLORIDE SERPL-SCNC: 105 MMOL/L (ref 98–107)
CO2 SERPL-SCNC: 26.7 MMOL/L (ref 22–29)
CREAT SERPL-MCNC: 1.04 MG/DL (ref 0.76–1.27)
DEPRECATED RDW RBC AUTO: 41.8 FL (ref 37–54)
EOSINOPHIL # BLD AUTO: 0.1 10*3/MM3 (ref 0–0.4)
EOSINOPHIL NFR BLD AUTO: 2.5 % (ref 0.3–6.2)
ERYTHROCYTE [DISTWIDTH] IN BLOOD BY AUTOMATED COUNT: 12.8 % (ref 12.3–15.4)
FERRITIN SERPL-MCNC: 260 NG/ML (ref 30–400)
GFR SERPL CREATININE-BSD FRML MDRD: 75 ML/MIN/1.73
GLOBULIN UR ELPH-MCNC: 3 GM/DL
GLUCOSE SERPL-MCNC: 117 MG/DL (ref 65–99)
HCT VFR BLD AUTO: 40.8 % (ref 37.5–51)
HGB BLD-MCNC: 13.8 G/DL (ref 13–17.7)
IMM GRANULOCYTES # BLD AUTO: 0.01 10*3/MM3 (ref 0–0.05)
IMM GRANULOCYTES NFR BLD AUTO: 0.2 % (ref 0–0.5)
IRON 24H UR-MRATE: 55 MCG/DL (ref 59–158)
LYMPHOCYTES # BLD AUTO: 1.35 10*3/MM3 (ref 0.7–3.1)
LYMPHOCYTES NFR BLD AUTO: 33.7 % (ref 19.6–45.3)
MCH RBC QN AUTO: 30.2 PG (ref 26.6–33)
MCHC RBC AUTO-ENTMCNC: 33.8 G/DL (ref 31.5–35.7)
MCV RBC AUTO: 89.3 FL (ref 79–97)
MONOCYTES # BLD AUTO: 0.29 10*3/MM3 (ref 0.1–0.9)
MONOCYTES NFR BLD AUTO: 7.2 % (ref 5–12)
NEUTROPHILS NFR BLD AUTO: 2.25 10*3/MM3 (ref 1.7–7)
NEUTROPHILS NFR BLD AUTO: 56.2 % (ref 42.7–76)
NRBC BLD AUTO-RTO: 0 /100 WBC (ref 0–0.2)
PLATELET # BLD AUTO: 151 10*3/MM3 (ref 140–450)
PMV BLD AUTO: 11.6 FL (ref 6–12)
POTASSIUM SERPL-SCNC: 4.3 MMOL/L (ref 3.5–5.2)
PROT SERPL-MCNC: 7.1 G/DL (ref 6–8.5)
RBC # BLD AUTO: 4.57 10*6/MM3 (ref 4.14–5.8)
SODIUM SERPL-SCNC: 140 MMOL/L (ref 136–145)
WBC # BLD AUTO: 4.01 10*3/MM3 (ref 3.4–10.8)

## 2020-07-28 ENCOUNTER — OFFICE VISIT (OUTPATIENT)
Dept: GASTROENTEROLOGY | Facility: CLINIC | Age: 55
End: 2020-07-28

## 2020-07-28 VITALS
BODY MASS INDEX: 34.89 KG/M2 | HEART RATE: 85 BPM | DIASTOLIC BLOOD PRESSURE: 82 MMHG | HEIGHT: 71 IN | WEIGHT: 249.2 LBS | SYSTOLIC BLOOD PRESSURE: 119 MMHG

## 2020-07-28 DIAGNOSIS — R10.84 GENERALIZED ABDOMINAL PAIN: ICD-10-CM

## 2020-07-28 DIAGNOSIS — K57.30 DIVERTICULOSIS OF LARGE INTESTINE WITHOUT HEMORRHAGE: ICD-10-CM

## 2020-07-28 DIAGNOSIS — E61.1 LOW IRON: ICD-10-CM

## 2020-07-28 DIAGNOSIS — K21.00 GASTROESOPHAGEAL REFLUX DISEASE WITH ESOPHAGITIS: Primary | ICD-10-CM

## 2020-07-28 PROBLEM — K92.1 MELENA: Status: RESOLVED | Noted: 2017-10-25 | Resolved: 2020-07-28

## 2020-07-28 PROBLEM — R19.7 VOMITING AND DIARRHEA: Status: RESOLVED | Noted: 2017-10-20 | Resolved: 2020-07-28

## 2020-07-28 PROBLEM — R11.10 VOMITING AND DIARRHEA: Status: RESOLVED | Noted: 2017-10-20 | Resolved: 2020-07-28

## 2020-07-28 PROCEDURE — 99213 OFFICE O/P EST LOW 20 MIN: CPT | Performed by: PHYSICIAN ASSISTANT

## 2020-07-28 RX ORDER — QUETIAPINE FUMARATE 100 MG/1
150 TABLET, FILM COATED ORAL NIGHTLY
COMMUNITY

## 2020-07-30 RX ORDER — TAMSULOSIN HYDROCHLORIDE 0.4 MG/1
CAPSULE ORAL
Qty: 30 CAPSULE | Refills: 11 | Status: SHIPPED | OUTPATIENT
Start: 2020-07-30 | End: 2020-07-30

## 2020-07-30 RX ORDER — TAMSULOSIN HYDROCHLORIDE 0.4 MG/1
CAPSULE ORAL
Qty: 30 CAPSULE | Refills: 11 | Status: SHIPPED | OUTPATIENT
Start: 2020-07-30 | End: 2021-06-10 | Stop reason: SDUPTHER

## 2020-08-24 ENCOUNTER — TELEPHONE (OUTPATIENT)
Dept: FAMILY MEDICINE CLINIC | Facility: CLINIC | Age: 55
End: 2020-08-24

## 2020-08-24 DIAGNOSIS — R19.7 DIARRHEA, UNSPECIFIED TYPE: ICD-10-CM

## 2020-08-24 DIAGNOSIS — R21 RASH: ICD-10-CM

## 2020-08-24 DIAGNOSIS — R05.9 COUGH: ICD-10-CM

## 2020-08-24 DIAGNOSIS — R50.9 FEVER, UNSPECIFIED FEVER CAUSE: ICD-10-CM

## 2020-08-24 NOTE — TELEPHONE ENCOUNTER
Pt has scripts that will  in September:    Dextromethorphan-guaifenesin  Imodium  Acetaminophen  neomycin

## 2020-08-25 RX ORDER — LOPERAMIDE HYDROCHLORIDE 2 MG/1
2 CAPSULE ORAL 4 TIMES DAILY PRN
Qty: 30 CAPSULE | Refills: 11 | Status: SHIPPED | OUTPATIENT
Start: 2020-08-25 | End: 2021-08-23 | Stop reason: SDUPTHER

## 2020-08-25 RX ORDER — LOPERAMIDE HYDROCHLORIDE 2 MG/1
2 CAPSULE ORAL 4 TIMES DAILY PRN
Qty: 30 CAPSULE | Refills: 11 | Status: SHIPPED | OUTPATIENT
Start: 2020-08-25 | End: 2020-08-25

## 2020-08-25 RX ORDER — ACETAMINOPHEN 325 MG/1
650 TABLET ORAL EVERY 4 HOURS PRN
Qty: 30 TABLET | Refills: 11 | Status: SHIPPED | OUTPATIENT
Start: 2020-08-25 | End: 2021-08-23 | Stop reason: SDUPTHER

## 2020-08-25 RX ORDER — ACETAMINOPHEN 325 MG/1
650 TABLET ORAL EVERY 4 HOURS PRN
Qty: 30 TABLET | Refills: 11 | Status: SHIPPED | OUTPATIENT
Start: 2020-08-25 | End: 2020-08-25

## 2020-08-27 ENCOUNTER — LAB (OUTPATIENT)
Dept: LAB | Facility: HOSPITAL | Age: 55
End: 2020-08-27

## 2020-08-27 ENCOUNTER — OFFICE VISIT (OUTPATIENT)
Dept: FAMILY MEDICINE CLINIC | Facility: CLINIC | Age: 55
End: 2020-08-27

## 2020-08-27 VITALS
WEIGHT: 242 LBS | BODY MASS INDEX: 33.88 KG/M2 | SYSTOLIC BLOOD PRESSURE: 119 MMHG | TEMPERATURE: 97.6 F | OXYGEN SATURATION: 95 % | DIASTOLIC BLOOD PRESSURE: 75 MMHG | RESPIRATION RATE: 20 BRPM | HEIGHT: 71 IN | HEART RATE: 67 BPM

## 2020-08-27 DIAGNOSIS — F79 INTELLECTUAL DISABILITY: ICD-10-CM

## 2020-08-27 DIAGNOSIS — E11.65 TYPE 2 DIABETES MELLITUS WITH HYPERGLYCEMIA, WITHOUT LONG-TERM CURRENT USE OF INSULIN (HCC): ICD-10-CM

## 2020-08-27 DIAGNOSIS — Z12.5 ENCOUNTER FOR SCREENING FOR MALIGNANT NEOPLASM OF PROSTATE: Primary | ICD-10-CM

## 2020-08-27 DIAGNOSIS — N39.44 NOCTURNAL ENURESIS: ICD-10-CM

## 2020-08-27 PROCEDURE — G0103 PSA SCREENING: HCPCS | Performed by: NURSE PRACTITIONER

## 2020-08-27 PROCEDURE — 83036 HEMOGLOBIN GLYCOSYLATED A1C: CPT | Performed by: NURSE PRACTITIONER

## 2020-08-27 PROCEDURE — 99213 OFFICE O/P EST LOW 20 MIN: CPT | Performed by: NURSE PRACTITIONER

## 2020-08-27 NOTE — PROGRESS NOTES
Subjective   Orlin Mendes is a 55 y.o. male.     Orlin reese, age 55 is brought in by his  for his annual physical.  Her only complaint at this time is that he has some issues with urinary incontinence.  They do limit his fluid intake in his group home, however this does not seem to be helping.  His last CMP did show that he had a mildly elevated glucose of 117.  He does need a hemoglobin A1c drawn today.  Also PSA has not been drawn and he does need to have this done.  Otherwise he is doing well on all of his medications does not need any refills today.    Difficulty Urinating   This is a new (episodes of urinary incontinence) problem. The current episode started more than 1 month ago. The problem occurs constantly. The problem has been unchanged. Pertinent negatives include no abdominal pain, anorexia, arthralgias, change in bowel habit, chest pain, chills, congestion, coughing, diaphoresis, fatigue, fever, headaches, joint swelling, myalgias, nausea, neck pain, numbness, rash, sore throat, swollen glands, urinary symptoms, vertigo, visual change, vomiting or weakness. Nothing aggravates the symptoms. Treatments tried: limiting fluid intake. The treatment provided no relief.        The following portions of the patient's history were reviewed and updated as appropriate: allergies, current medications, past family history, past medical history, past social history, past surgical history and problem list.    Review of Systems   Constitutional: Negative.  Negative for chills, diaphoresis, fatigue and fever.   HENT: Negative.  Negative for congestion, sore throat and swollen glands.    Eyes: Negative.    Respiratory: Negative.  Negative for cough.    Cardiovascular: Negative.  Negative for chest pain.   Gastrointestinal: Negative.  Negative for abdominal pain, anorexia, change in bowel habit, nausea and vomiting.   Endocrine: Negative.    Genitourinary: Positive for difficulty urinating.    Musculoskeletal: Negative.  Negative for arthralgias, joint swelling, myalgias and neck pain.   Skin: Negative.  Negative for rash.   Allergic/Immunologic: Negative.    Neurological: Negative.  Negative for vertigo, weakness and numbness.   Hematological: Negative.    Psychiatric/Behavioral: Negative.        Objective   Physical Exam   Constitutional: He is oriented to person, place, and time. He appears well-developed and well-nourished. No distress.   HENT:   Head: Normocephalic and atraumatic.   Right Ear: External ear normal.   Left Ear: External ear normal.   Nose: Nose normal.   Mouth/Throat: Oropharynx is clear and moist. No oropharyngeal exudate.   Eyes: Pupils are equal, round, and reactive to light.   Neck: Normal range of motion. Neck supple. No thyromegaly present.   Cardiovascular: Normal rate, regular rhythm and normal heart sounds. Exam reveals no friction rub.   No murmur heard.  Pulmonary/Chest: Effort normal and breath sounds normal. No respiratory distress. He has no wheezes. He has no rales.   Abdominal: Soft.   Musculoskeletal: Normal range of motion.   Neurological: He is alert and oriented to person, place, and time.   Skin: Skin is warm and dry.   Psychiatric: He has a normal mood and affect. Thought content normal.   Nursing note and vitals reviewed.        Assessment/Plan   Orlin was seen today for annual exam.    Diagnoses and all orders for this visit:    Encounter for screening for malignant neoplasm of prostate  -     PSA SCREENING    Type 2 diabetes mellitus with hyperglycemia, without long-term current use of insulin (CMS/[facility])  -     Hemoglobin A1c    Intellectual disability    Nocturnal enuresis      BMI is noted to be 33.8 which is considered obese diet and exercise information will be provided to this patient.

## 2020-08-27 NOTE — PATIENT INSTRUCTIONS
Calorie Counting for Weight Loss  Calories are units of energy. Your body needs a certain amount of calories from food to keep you going throughout the day. When you eat more calories than your body needs, your body stores the extra calories as fat. When you eat fewer calories than your body needs, your body burns fat to get the energy it needs.  Calorie counting means keeping track of how many calories you eat and drink each day. Calorie counting can be helpful if you need to lose weight. If you make sure to eat fewer calories than your body needs, you should lose weight. Ask your health care provider what a healthy weight is for you.  For calorie counting to work, you will need to eat the right number of calories in a day in order to lose a healthy amount of weight per week. A dietitian can help you determine how many calories you need in a day and will give you suggestions on how to reach your calorie goal.  · A healthy amount of weight to lose per week is usually 1-2 lb (0.5-0.9 kg). This usually means that your daily calorie intake should be reduced by 500-750 calories.  · Eating 1,200 - 1,500 calories per day can help most women lose weight.  · Eating 1,500 - 1,800 calories per day can help most men lose weight.  What is my plan?  My goal is to have __________ calories per day.  If I have this many calories per day, I should lose around __________ pounds per week.  What do I need to know about calorie counting?  In order to meet your daily calorie goal, you will need to:  · Find out how many calories are in each food you would like to eat. Try to do this before you eat.  · Decide how much of the food you plan to eat.  · Write down what you ate and how many calories it had. Doing this is called keeping a food log.  To successfully lose weight, it is important to balance calorie counting with a healthy lifestyle that includes regular activity. Aim for 150 minutes of moderate exercise (such as walking) or 75  minutes of vigorous exercise (such as running) each week.  Where do I find calorie information?    The number of calories in a food can be found on a Nutrition Facts label. If a food does not have a Nutrition Facts label, try to look up the calories online or ask your dietitian for help.  Remember that calories are listed per serving. If you choose to have more than one serving of a food, you will have to multiply the calories per serving by the amount of servings you plan to eat. For example, the label on a package of bread might say that a serving size is 1 slice and that there are 90 calories in a serving. If you eat 1 slice, you will have eaten 90 calories. If you eat 2 slices, you will have eaten 180 calories.  How do I keep a food log?  Immediately after each meal, record the following information in your food log:  · What you ate. Don't forget to include toppings, sauces, and other extras on the food.  · How much you ate. This can be measured in cups, ounces, or number of items.  · How many calories each food and drink had.  · The total number of calories in the meal.  Keep your food log near you, such as in a small notebook in your pocket, or use a mobile jg or website. Some programs will calculate calories for you and show you how many calories you have left for the day to meet your goal.  What are some calorie counting tips?    · Use your calories on foods and drinks that will fill you up and not leave you hungry:  ? Some examples of foods that fill you up are nuts and nut butters, vegetables, lean proteins, and high-fiber foods like whole grains. High-fiber foods are foods with more than 5 g fiber per serving.  ? Drinks such as sodas, specialty coffee drinks, alcohol, and juices have a lot of calories, yet do not fill you up.  · Eat nutritious foods and avoid empty calories. Empty calories are calories you get from foods or beverages that do not have many vitamins or protein, such as candy, sweets, and  "soda. It is better to have a nutritious high-calorie food (such as an avocado) than a food with few nutrients (such as a bag of chips).  · Know how many calories are in the foods you eat most often. This will help you calculate calorie counts faster.  · Pay attention to calories in drinks. Low-calorie drinks include water and unsweetened drinks.  · Pay attention to nutrition labels for \"low fat\" or \"fat free\" foods. These foods sometimes have the same amount of calories or more calories than the full fat versions. They also often have added sugar, starch, or salt, to make up for flavor that was removed with the fat.  · Find a way of tracking calories that works for you. Get creative. Try different apps or programs if writing down calories does not work for you.  What are some portion control tips?  · Know how many calories are in a serving. This will help you know how many servings of a certain food you can have.  · Use a measuring cup to measure serving sizes. You could also try weighing out portions on a kitchen scale. With time, you will be able to estimate serving sizes for some foods.  · Take some time to put servings of different foods on your favorite plates, bowls, and cups so you know what a serving looks like.  · Try not to eat straight from a bag or box. Doing this can lead to overeating. Put the amount you would like to eat in a cup or on a plate to make sure you are eating the right portion.  · Use smaller plates, glasses, and bowls to prevent overeating.  · Try not to multitask (for example, watch TV or use your computer) while eating. If it is time to eat, sit down at a table and enjoy your food. This will help you to know when you are full. It will also help you to be aware of what you are eating and how much you are eating.  What are tips for following this plan?  Reading food labels  · Check the calorie count compared to the serving size. The serving size may be smaller than what you are used to " "eating.  · Check the source of the calories. Make sure the food you are eating is high in vitamins and protein and low in saturated and trans fats.  Shopping  · Read nutrition labels while you shop. This will help you make healthy decisions before you decide to purchase your food.  · Make a grocery list and stick to it.  Cooking  · Try to cook your favorite foods in a healthier way. For example, try baking instead of frying.  · Use low-fat dairy products.  Meal planning  · Use more fruits and vegetables. Half of your plate should be fruits and vegetables.  · Include lean proteins like poultry and fish.  How do I count calories when eating out?  · Ask for smaller portion sizes.  · Consider sharing an entree and sides instead of getting your own entree.  · If you get your own entree, eat only half. Ask for a box at the beginning of your meal and put the rest of your entree in it so you are not tempted to eat it.  · If calories are listed on the menu, choose the lower calorie options.  · Choose dishes that include vegetables, fruits, whole grains, low-fat dairy products, and lean protein.  · Choose items that are boiled, broiled, grilled, or steamed. Stay away from items that are buttered, battered, fried, or served with cream sauce. Items labeled \"crispy\" are usually fried, unless stated otherwise.  · Choose water, low-fat milk, unsweetened iced tea, or other drinks without added sugar. If you want an alcoholic beverage, choose a lower calorie option such as a glass of wine or light beer.  · Ask for dressings, sauces, and syrups on the side. These are usually high in calories, so you should limit the amount you eat.  · If you want a salad, choose a garden salad and ask for grilled meats. Avoid extra toppings like noble, cheese, or fried items. Ask for the dressing on the side, or ask for olive oil and vinegar or lemon to use as dressing.  · Estimate how many servings of a food you are given. For example, a serving of " cooked rice is ½ cup or about the size of half a baseball. Knowing serving sizes will help you be aware of how much food you are eating at restaurants. The list below tells you how big or small some common portion sizes are based on everyday objects:  ? 1 oz--4 stacked dice.  ? 3 oz--1 deck of cards.  ? 1 tsp--1 die.  ? 1 Tbsp--½ a ping-pong ball.  ? 2 Tbsp--1 ping-pong ball.  ? ½ cup--½ baseball.  ? 1 cup--1 baseball.  Summary  · Calorie counting means keeping track of how many calories you eat and drink each day. If you eat fewer calories than your body needs, you should lose weight.  · A healthy amount of weight to lose per week is usually 1-2 lb (0.5-0.9 kg). This usually means reducing your daily calorie intake by 500-750 calories.  · The number of calories in a food can be found on a Nutrition Facts label. If a food does not have a Nutrition Facts label, try to look up the calories online or ask your dietitian for help.  · Use your calories on foods and drinks that will fill you up, and not on foods and drinks that will leave you hungry.  · Use smaller plates, glasses, and bowls to prevent overeating.  This information is not intended to replace advice given to you by your health care provider. Make sure you discuss any questions you have with your health care provider.  Document Released: 12/18/2006 Document Revised: 09/06/2019 Document Reviewed: 11/17/2017  [employer] Patient Education © 2020 [employer]      Exercising to Lose Weight  Exercise is structured, repetitive physical activity to improve fitness and health. Getting regular exercise is important for everyone. It is especially important if you are overweight. Being overweight increases your risk of heart disease, stroke, diabetes, high blood pressure, and several types of cancer. Reducing your calorie intake and exercising can help you lose weight.  Exercise is usually categorized as moderate or vigorous intensity. To lose weight, most people need  to do a certain amount of moderate-intensity or vigorous-intensity exercise each week.  Moderate-intensity exercise    Moderate-intensity exercise is any activity that gets you moving enough to burn at least three times more energy (calories) than if you were sitting.  Examples of moderate exercise include:  · Walking a mile in 15 minutes.  · Doing light yard work.  · Biking at an easy pace.  Most people should get at least 150 minutes (2 hours and 30 minutes) a week of moderate-intensity exercise to maintain their body weight.  Vigorous-intensity exercise  Vigorous-intensity exercise is any activity that gets you moving enough to burn at least six times more calories than if you were sitting. When you exercise at this intensity, you should be working hard enough that you are not able to carry on a conversation.  Examples of vigorous exercise include:  · Running.  · Playing a team sport, such as football, basketball, and soccer.  · Jumping rope.  Most people should get at least 75 minutes (1 hour and 15 minutes) a week of vigorous-intensity exercise to maintain their body weight.  How can exercise affect me?  When you exercise enough to burn more calories than you eat, you lose weight. Exercise also reduces body fat and builds muscle. The more muscle you have, the more calories you burn. Exercise also:  · Improves mood.  · Reduces stress and tension.  · Improves your overall fitness, flexibility, and endurance.  · Increases bone strength.  The amount of exercise you need to lose weight depends on:  · Your age.  · The type of exercise.  · Any health conditions you have.  · Your overall physical ability.  Talk to your health care provider about how much exercise you need and what types of activities are safe for you.  What actions can I take to lose weight?  Nutrition    · Make changes to your diet as told by your health care provider or diet and nutrition specialist (dietitian). This may include:  ? Eating fewer  calories.  ? Eating more protein.  ? Eating less unhealthy fats.  ? Eating a diet that includes fresh fruits and vegetables, whole grains, low-fat dairy products, and lean protein.  ? Avoiding foods with added fat, salt, and sugar.  · Drink plenty of water while you exercise to prevent dehydration or heat stroke.  Activity  · Choose an activity that you enjoy and set realistic goals. Your health care provider can help you make an exercise plan that works for you.  · Exercise at a moderate or vigorous intensity most days of the week.  ? The intensity of exercise may vary from person to person. You can tell how intense a workout is for you by paying attention to your breathing and heartbeat. Most people will notice their breathing and heartbeat get faster with more intense exercise.  · Do resistance training twice each week, such as:  ? Push-ups.  ? Sit-ups.  ? Lifting weights.  ? Using resistance bands.  · Getting short amounts of exercise can be just as helpful as long structured periods of exercise. If you have trouble finding time to exercise, try to include exercise in your daily routine.  ? Get up, stretch, and walk around every 30 minutes throughout the day.  ? Go for a walk during your lunch break.  ? Park your car farther away from your destination.  ? If you take public transportation, get off one stop early and walk the rest of the way.  ? Make phone calls while standing up and walking around.  ? Take the stairs instead of elevators or escalators.  · Wear comfortable clothes and shoes with good support.  · Do not exercise so much that you hurt yourself, feel dizzy, or get very short of breath.  Where to find more information  · [place] Department of Health and Human Services: www.hhs.gov  · Centers for Disease Control and Prevention (CDC): www.cdc.gov  Contact a health care provider:  · Before starting a new exercise program.  · If you have questions or concerns about your weight.  · If you have a medical  problem that keeps you from exercising.  Get help right away if you have any of the following while exercising:  · Injury.  · Dizziness.  · Difficulty breathing or shortness of breath that does not go away when you stop exercising.  · Chest pain.  · Rapid heartbeat.  Summary  · Being overweight increases your risk of heart disease, stroke, diabetes, high blood pressure, and several types of cancer.  · Losing weight happens when you burn more calories than you eat.  · Reducing the amount of calories you eat in addition to getting regular moderate or vigorous exercise each week helps you lose weight.  This information is not intended to replace advice given to you by your health care provider. Make sure you discuss any questions you have with your health care provider.  Document Released: 01/20/2012 Document Revised: 12/31/2018 Document Reviewed: 12/31/2018  [employer] Patient Education © 2020 [employer]

## 2020-08-28 LAB
HBA1C MFR BLD: 4.9 % (ref 4.8–5.6)
PSA SERPL-MCNC: 0.88 NG/ML (ref 0–4)

## 2021-01-08 DIAGNOSIS — F41.9 ANXIETY: ICD-10-CM

## 2021-01-08 RX ORDER — THIORIDAZINE HYDROCHLORIDE 25 MG/1
TABLET, FILM COATED ORAL
Qty: 210 TABLET | Refills: 11 | Status: SHIPPED | OUTPATIENT
Start: 2021-01-08 | End: 2021-01-08 | Stop reason: SDUPTHER

## 2021-01-08 RX ORDER — THIORIDAZINE HYDROCHLORIDE 25 MG/1
TABLET, FILM COATED ORAL
Qty: 210 TABLET | Refills: 11 | Status: SHIPPED | OUTPATIENT
Start: 2021-01-08

## 2021-01-18 ENCOUNTER — LAB (OUTPATIENT)
Dept: LAB | Facility: HOSPITAL | Age: 56
End: 2021-01-18

## 2021-01-18 DIAGNOSIS — K21.00 GASTROESOPHAGEAL REFLUX DISEASE WITH ESOPHAGITIS: ICD-10-CM

## 2021-01-18 DIAGNOSIS — K57.30 DIVERTICULOSIS OF LARGE INTESTINE WITHOUT HEMORRHAGE: ICD-10-CM

## 2021-01-18 DIAGNOSIS — R10.84 GENERALIZED ABDOMINAL PAIN: ICD-10-CM

## 2021-01-18 LAB
ALBUMIN SERPL-MCNC: 4.1 G/DL (ref 3.5–5.2)
ALBUMIN/GLOB SERPL: 1.6 G/DL
ALP SERPL-CCNC: 74 U/L (ref 39–117)
ALT SERPL W P-5'-P-CCNC: 14 U/L (ref 1–41)
ANION GAP SERPL CALCULATED.3IONS-SCNC: 9.9 MMOL/L (ref 5–15)
AST SERPL-CCNC: 11 U/L (ref 1–40)
BASOPHILS # BLD AUTO: 0.03 10*3/MM3 (ref 0–0.2)
BASOPHILS NFR BLD AUTO: 0.8 % (ref 0–1.5)
BILIRUB SERPL-MCNC: 0.2 MG/DL (ref 0–1.2)
BUN SERPL-MCNC: 9 MG/DL (ref 6–20)
BUN/CREAT SERPL: 11 (ref 7–25)
CALCIUM SPEC-SCNC: 8.5 MG/DL (ref 8.6–10.5)
CHLORIDE SERPL-SCNC: 106 MMOL/L (ref 98–107)
CO2 SERPL-SCNC: 26.1 MMOL/L (ref 22–29)
CREAT SERPL-MCNC: 0.82 MG/DL (ref 0.76–1.27)
DEPRECATED RDW RBC AUTO: 38.8 FL (ref 37–54)
EOSINOPHIL # BLD AUTO: 0.05 10*3/MM3 (ref 0–0.4)
EOSINOPHIL NFR BLD AUTO: 1.3 % (ref 0.3–6.2)
ERYTHROCYTE [DISTWIDTH] IN BLOOD BY AUTOMATED COUNT: 12.1 % (ref 12.3–15.4)
GFR SERPL CREATININE-BSD FRML MDRD: 98 ML/MIN/1.73
GLOBULIN UR ELPH-MCNC: 2.6 GM/DL
GLUCOSE SERPL-MCNC: 90 MG/DL (ref 65–99)
HCT VFR BLD AUTO: 39.6 % (ref 37.5–51)
HGB BLD-MCNC: 13.7 G/DL (ref 13–17.7)
IMM GRANULOCYTES # BLD AUTO: 0.01 10*3/MM3 (ref 0–0.05)
IMM GRANULOCYTES NFR BLD AUTO: 0.3 % (ref 0–0.5)
LYMPHOCYTES # BLD AUTO: 1.2 10*3/MM3 (ref 0.7–3.1)
LYMPHOCYTES NFR BLD AUTO: 31.5 % (ref 19.6–45.3)
MCH RBC QN AUTO: 30.5 PG (ref 26.6–33)
MCHC RBC AUTO-ENTMCNC: 34.6 G/DL (ref 31.5–35.7)
MCV RBC AUTO: 88.2 FL (ref 79–97)
MONOCYTES # BLD AUTO: 0.36 10*3/MM3 (ref 0.1–0.9)
MONOCYTES NFR BLD AUTO: 9.4 % (ref 5–12)
NEUTROPHILS NFR BLD AUTO: 2.16 10*3/MM3 (ref 1.7–7)
NEUTROPHILS NFR BLD AUTO: 56.7 % (ref 42.7–76)
NRBC BLD AUTO-RTO: 0 /100 WBC (ref 0–0.2)
PLATELET # BLD AUTO: 168 10*3/MM3 (ref 140–450)
PMV BLD AUTO: 11.6 FL (ref 6–12)
POTASSIUM SERPL-SCNC: 4.4 MMOL/L (ref 3.5–5.2)
PROT SERPL-MCNC: 6.7 G/DL (ref 6–8.5)
RBC # BLD AUTO: 4.49 10*6/MM3 (ref 4.14–5.8)
SODIUM SERPL-SCNC: 142 MMOL/L (ref 136–145)
WBC # BLD AUTO: 3.81 10*3/MM3 (ref 3.4–10.8)

## 2021-01-18 PROCEDURE — 80053 COMPREHEN METABOLIC PANEL: CPT

## 2021-01-18 PROCEDURE — 85025 COMPLETE CBC W/AUTO DIFF WBC: CPT

## 2021-02-19 ENCOUNTER — TELEPHONE (OUTPATIENT)
Dept: FAMILY MEDICINE CLINIC | Facility: CLINIC | Age: 56
End: 2021-02-19

## 2021-02-22 ENCOUNTER — OFFICE VISIT (OUTPATIENT)
Dept: FAMILY MEDICINE CLINIC | Facility: CLINIC | Age: 56
End: 2021-02-22

## 2021-02-22 VITALS
SYSTOLIC BLOOD PRESSURE: 126 MMHG | TEMPERATURE: 96.2 F | OXYGEN SATURATION: 98 % | HEIGHT: 71 IN | RESPIRATION RATE: 20 BRPM | HEART RATE: 91 BPM | WEIGHT: 245 LBS | DIASTOLIC BLOOD PRESSURE: 77 MMHG | BODY MASS INDEX: 34.3 KG/M2

## 2021-02-22 DIAGNOSIS — K21.9 GASTROESOPHAGEAL REFLUX DISEASE: Primary | ICD-10-CM

## 2021-02-22 DIAGNOSIS — F41.9 ANXIETY: ICD-10-CM

## 2021-02-22 PROCEDURE — 99214 OFFICE O/P EST MOD 30 MIN: CPT | Performed by: NURSE PRACTITIONER

## 2021-02-22 RX ORDER — OMEPRAZOLE 20 MG/1
20 CAPSULE, DELAYED RELEASE ORAL DAILY
Qty: 30 CAPSULE | Refills: 11 | Status: SHIPPED | OUTPATIENT
Start: 2021-02-22

## 2021-03-04 NOTE — PATIENT INSTRUCTIONS
Calorie Counting for Weight Loss  Calories are units of energy. Your body needs a certain amount of calories from food to keep you going throughout the day. When you eat more calories than your body needs, your body stores the extra calories as fat. When you eat fewer calories than your body needs, your body burns fat to get the energy it needs.  Calorie counting means keeping track of how many calories you eat and drink each day. Calorie counting can be helpful if you need to lose weight. If you make sure to eat fewer calories than your body needs, you should lose weight. Ask your health care provider what a healthy weight is for you.  For calorie counting to work, you will need to eat the right number of calories in a day in order to lose a healthy amount of weight per week. A dietitian can help you determine how many calories you need in a day and will give you suggestions on how to reach your calorie goal.  · A healthy amount of weight to lose per week is usually 1-2 lb (0.5-0.9 kg). This usually means that your daily calorie intake should be reduced by 500-750 calories.  · Eating 1,200 - 1,500 calories per day can help most women lose weight.  · Eating 1,500 - 1,800 calories per day can help most men lose weight.  What is my plan?  My goal is to have __________ calories per day.  If I have this many calories per day, I should lose around __________ pounds per week.  What do I need to know about calorie counting?  In order to meet your daily calorie goal, you will need to:  · Find out how many calories are in each food you would like to eat. Try to do this before you eat.  · Decide how much of the food you plan to eat.  · Write down what you ate and how many calories it had. Doing this is called keeping a food log.  To successfully lose weight, it is important to balance calorie counting with a healthy lifestyle that includes regular activity. Aim for 150 minutes of moderate exercise (such as walking) or 75  minutes of vigorous exercise (such as running) each week.  Where do I find calorie information?    The number of calories in a food can be found on a Nutrition Facts label. If a food does not have a Nutrition Facts label, try to look up the calories online or ask your dietitian for help.  Remember that calories are listed per serving. If you choose to have more than one serving of a food, you will have to multiply the calories per serving by the amount of servings you plan to eat. For example, the label on a package of bread might say that a serving size is 1 slice and that there are 90 calories in a serving. If you eat 1 slice, you will have eaten 90 calories. If you eat 2 slices, you will have eaten 180 calories.  How do I keep a food log?  Immediately after each meal, record the following information in your food log:  · What you ate. Don't forget to include toppings, sauces, and other extras on the food.  · How much you ate. This can be measured in cups, ounces, or number of items.  · How many calories each food and drink had.  · The total number of calories in the meal.  Keep your food log near you, such as in a small notebook in your pocket, or use a mobile jg or website. Some programs will calculate calories for you and show you how many calories you have left for the day to meet your goal.  What are some calorie counting tips?    · Use your calories on foods and drinks that will fill you up and not leave you hungry:  ? Some examples of foods that fill you up are nuts and nut butters, vegetables, lean proteins, and high-fiber foods like whole grains. High-fiber foods are foods with more than 5 g fiber per serving.  ? Drinks such as sodas, specialty coffee drinks, alcohol, and juices have a lot of calories, yet do not fill you up.  · Eat nutritious foods and avoid empty calories. Empty calories are calories you get from foods or beverages that do not have many vitamins or protein, such as candy, sweets, and  "soda. It is better to have a nutritious high-calorie food (such as an avocado) than a food with few nutrients (such as a bag of chips).  · Know how many calories are in the foods you eat most often. This will help you calculate calorie counts faster.  · Pay attention to calories in drinks. Low-calorie drinks include water and unsweetened drinks.  · Pay attention to nutrition labels for \"low fat\" or \"fat free\" foods. These foods sometimes have the same amount of calories or more calories than the full fat versions. They also often have added sugar, starch, or salt, to make up for flavor that was removed with the fat.  · Find a way of tracking calories that works for you. Get creative. Try different apps or programs if writing down calories does not work for you.  What are some portion control tips?  · Know how many calories are in a serving. This will help you know how many servings of a certain food you can have.  · Use a measuring cup to measure serving sizes. You could also try weighing out portions on a kitchen scale. With time, you will be able to estimate serving sizes for some foods.  · Take some time to put servings of different foods on your favorite plates, bowls, and cups so you know what a serving looks like.  · Try not to eat straight from a bag or box. Doing this can lead to overeating. Put the amount you would like to eat in a cup or on a plate to make sure you are eating the right portion.  · Use smaller plates, glasses, and bowls to prevent overeating.  · Try not to multitask (for example, watch TV or use your computer) while eating. If it is time to eat, sit down at a table and enjoy your food. This will help you to know when you are full. It will also help you to be aware of what you are eating and how much you are eating.  What are tips for following this plan?  Reading food labels  · Check the calorie count compared to the serving size. The serving size may be smaller than what you are used to " "eating.  · Check the source of the calories. Make sure the food you are eating is high in vitamins and protein and low in saturated and trans fats.  Shopping  · Read nutrition labels while you shop. This will help you make healthy decisions before you decide to purchase your food.  · Make a grocery list and stick to it.  Cooking  · Try to cook your favorite foods in a healthier way. For example, try baking instead of frying.  · Use low-fat dairy products.  Meal planning  · Use more fruits and vegetables. Half of your plate should be fruits and vegetables.  · Include lean proteins like poultry and fish.  How do I count calories when eating out?  · Ask for smaller portion sizes.  · Consider sharing an entree and sides instead of getting your own entree.  · If you get your own entree, eat only half. Ask for a box at the beginning of your meal and put the rest of your entree in it so you are not tempted to eat it.  · If calories are listed on the menu, choose the lower calorie options.  · Choose dishes that include vegetables, fruits, whole grains, low-fat dairy products, and lean protein.  · Choose items that are boiled, broiled, grilled, or steamed. Stay away from items that are buttered, battered, fried, or served with cream sauce. Items labeled \"crispy\" are usually fried, unless stated otherwise.  · Choose water, low-fat milk, unsweetened iced tea, or other drinks without added sugar. If you want an alcoholic beverage, choose a lower calorie option such as a glass of wine or light beer.  · Ask for dressings, sauces, and syrups on the side. These are usually high in calories, so you should limit the amount you eat.  · If you want a salad, choose a garden salad and ask for grilled meats. Avoid extra toppings like noble, cheese, or fried items. Ask for the dressing on the side, or ask for olive oil and vinegar or lemon to use as dressing.  · Estimate how many servings of a food you are given. For example, a serving of " cooked rice is ½ cup or about the size of half a baseball. Knowing serving sizes will help you be aware of how much food you are eating at restaurants. The list below tells you how big or small some common portion sizes are based on everyday objects:  ? 1 oz--4 stacked dice.  ? 3 oz--1 deck of cards.  ? 1 tsp--1 die.  ? 1 Tbsp--½ a ping-pong ball.  ? 2 Tbsp--1 ping-pong ball.  ? ½ cup--½ baseball.  ? 1 cup--1 baseball.  Summary  · Calorie counting means keeping track of how many calories you eat and drink each day. If you eat fewer calories than your body needs, you should lose weight.  · A healthy amount of weight to lose per week is usually 1-2 lb (0.5-0.9 kg). This usually means reducing your daily calorie intake by 500-750 calories.  · The number of calories in a food can be found on a Nutrition Facts label. If a food does not have a Nutrition Facts label, try to look up the calories online or ask your dietitian for help.  · Use your calories on foods and drinks that will fill you up, and not on foods and drinks that will leave you hungry.  · Use smaller plates, glasses, and bowls to prevent overeating.  This information is not intended to replace advice given to you by your health care provider. Make sure you discuss any questions you have with your health care provider.  Document Revised: 09/06/2019 Document Reviewed: 11/17/2017  [employer] Patient Education © 2020 [employer]      Exercising to Lose Weight  Exercise is structured, repetitive physical activity to improve fitness and health. Getting regular exercise is important for everyone. It is especially important if you are overweight. Being overweight increases your risk of heart disease, stroke, diabetes, high blood pressure, and several types of cancer. Reducing your calorie intake and exercising can help you lose weight.  Exercise is usually categorized as moderate or vigorous intensity. To lose weight, most people need to do a certain amount of  moderate-intensity or vigorous-intensity exercise each week.  Moderate-intensity exercise    Moderate-intensity exercise is any activity that gets you moving enough to burn at least three times more energy (calories) than if you were sitting.  Examples of moderate exercise include:  · Walking a mile in 15 minutes.  · Doing light yard work.  · Biking at an easy pace.  Most people should get at least 150 minutes (2 hours and 30 minutes) a week of moderate-intensity exercise to maintain their body weight.  Vigorous-intensity exercise  Vigorous-intensity exercise is any activity that gets you moving enough to burn at least six times more calories than if you were sitting. When you exercise at this intensity, you should be working hard enough that you are not able to carry on a conversation.  Examples of vigorous exercise include:  · Running.  · Playing a team sport, such as football, basketball, and soccer.  · Jumping rope.  Most people should get at least 75 minutes (1 hour and 15 minutes) a week of vigorous-intensity exercise to maintain their body weight.  How can exercise affect me?  When you exercise enough to burn more calories than you eat, you lose weight. Exercise also reduces body fat and builds muscle. The more muscle you have, the more calories you burn. Exercise also:  · Improves mood.  · Reduces stress and tension.  · Improves your overall fitness, flexibility, and endurance.  · Increases bone strength.  The amount of exercise you need to lose weight depends on:  · Your age.  · The type of exercise.  · Any health conditions you have.  · Your overall physical ability.  Talk to your health care provider about how much exercise you need and what types of activities are safe for you.  What actions can I take to lose weight?  Nutrition    · Make changes to your diet as told by your health care provider or diet and nutrition specialist (dietitian). This may include:  ? Eating fewer calories.  ? Eating more  protein.  ? Eating less unhealthy fats.  ? Eating a diet that includes fresh fruits and vegetables, whole grains, low-fat dairy products, and lean protein.  ? Avoiding foods with added fat, salt, and sugar.  · Drink plenty of water while you exercise to prevent dehydration or heat stroke.  Activity  · Choose an activity that you enjoy and set realistic goals. Your health care provider can help you make an exercise plan that works for you.  · Exercise at a moderate or vigorous intensity most days of the week.  ? The intensity of exercise may vary from person to person. You can tell how intense a workout is for you by paying attention to your breathing and heartbeat. Most people will notice their breathing and heartbeat get faster with more intense exercise.  · Do resistance training twice each week, such as:  ? Push-ups.  ? Sit-ups.  ? Lifting weights.  ? Using resistance bands.  · Getting short amounts of exercise can be just as helpful as long structured periods of exercise. If you have trouble finding time to exercise, try to include exercise in your daily routine.  ? Get up, stretch, and walk around every 30 minutes throughout the day.  ? Go for a walk during your lunch break.  ? Park your car farther away from your destination.  ? If you take public transportation, get off one stop early and walk the rest of the way.  ? Make phone calls while standing up and walking around.  ? Take the stairs instead of elevators or escalators.  · Wear comfortable clothes and shoes with good support.  · Do not exercise so much that you hurt yourself, feel dizzy, or get very short of breath.  Where to find more information  · [place] Department of Health and Human Services: www.hhs.gov  · Centers for Disease Control and Prevention (CDC): www.cdc.gov  Contact a health care provider:  · Before starting a new exercise program.  · If you have questions or concerns about your weight.  · If you have a medical problem that keeps you from  exercising.  Get help right away if you have any of the following while exercising:  · Injury.  · Dizziness.  · Difficulty breathing or shortness of breath that does not go away when you stop exercising.  · Chest pain.  · Rapid heartbeat.  Summary  · Being overweight increases your risk of heart disease, stroke, diabetes, high blood pressure, and several types of cancer.  · Losing weight happens when you burn more calories than you eat.  · Reducing the amount of calories you eat in addition to getting regular moderate or vigorous exercise each week helps you lose weight.  This information is not intended to replace advice given to you by your health care provider. Make sure you discuss any questions you have with your health care provider.  Document Revised: 12/31/2018 Document Reviewed: 12/31/2018  [employer] Patient Education © 2020 [employer]

## 2021-03-23 ENCOUNTER — OFFICE VISIT (OUTPATIENT)
Dept: GASTROENTEROLOGY | Facility: CLINIC | Age: 56
End: 2021-03-23

## 2021-03-23 VITALS
BODY MASS INDEX: 33.82 KG/M2 | DIASTOLIC BLOOD PRESSURE: 73 MMHG | HEART RATE: 63 BPM | SYSTOLIC BLOOD PRESSURE: 134 MMHG | WEIGHT: 241.6 LBS | HEIGHT: 71 IN

## 2021-03-23 DIAGNOSIS — K21.00 GASTROESOPHAGEAL REFLUX DISEASE WITH ESOPHAGITIS WITHOUT HEMORRHAGE: Primary | ICD-10-CM

## 2021-03-23 DIAGNOSIS — K57.30 DIVERTICULOSIS OF LARGE INTESTINE WITHOUT HEMORRHAGE: ICD-10-CM

## 2021-03-23 DIAGNOSIS — R10.84 GENERALIZED ABDOMINAL PAIN: ICD-10-CM

## 2021-03-23 DIAGNOSIS — E61.1 LOW IRON: ICD-10-CM

## 2021-03-23 PROCEDURE — 99213 OFFICE O/P EST LOW 20 MIN: CPT | Performed by: PHYSICIAN ASSISTANT

## 2021-06-10 ENCOUNTER — OFFICE VISIT (OUTPATIENT)
Dept: FAMILY MEDICINE CLINIC | Facility: CLINIC | Age: 56
End: 2021-06-10

## 2021-06-10 ENCOUNTER — LAB (OUTPATIENT)
Dept: LAB | Facility: HOSPITAL | Age: 56
End: 2021-06-10

## 2021-06-10 VITALS
HEIGHT: 71 IN | BODY MASS INDEX: 34.72 KG/M2 | OXYGEN SATURATION: 99 % | HEART RATE: 55 BPM | DIASTOLIC BLOOD PRESSURE: 74 MMHG | SYSTOLIC BLOOD PRESSURE: 122 MMHG | WEIGHT: 248 LBS | RESPIRATION RATE: 18 BRPM

## 2021-06-10 DIAGNOSIS — N40.1 BENIGN PROSTATIC HYPERPLASIA WITH URINARY FREQUENCY: ICD-10-CM

## 2021-06-10 DIAGNOSIS — R35.0 BENIGN PROSTATIC HYPERPLASIA WITH URINARY FREQUENCY: ICD-10-CM

## 2021-06-10 DIAGNOSIS — K21.00 GASTROESOPHAGEAL REFLUX DISEASE WITH ESOPHAGITIS WITHOUT HEMORRHAGE: Primary | ICD-10-CM

## 2021-06-10 LAB
BACTERIA UR QL AUTO: NORMAL /HPF
BILIRUB UR QL STRIP: NEGATIVE
CLARITY UR: CLEAR
COLOR UR: YELLOW
GLUCOSE UR STRIP-MCNC: NEGATIVE MG/DL
HGB UR QL STRIP.AUTO: NEGATIVE
HYALINE CASTS UR QL AUTO: NORMAL /LPF
KETONES UR QL STRIP: NEGATIVE
LEUKOCYTE ESTERASE UR QL STRIP.AUTO: NEGATIVE
NITRITE UR QL STRIP: NEGATIVE
PH UR STRIP.AUTO: 6.5 [PH] (ref 5–8)
PROT UR QL STRIP: NEGATIVE
RBC # UR: NORMAL /HPF
REF LAB TEST METHOD: NORMAL
SP GR UR STRIP: 1.01 (ref 1–1.03)
SQUAMOUS #/AREA URNS HPF: NORMAL /HPF
UROBILINOGEN UR QL STRIP: NORMAL
WBC UR QL AUTO: NORMAL /HPF

## 2021-06-10 PROCEDURE — 99213 OFFICE O/P EST LOW 20 MIN: CPT | Performed by: STUDENT IN AN ORGANIZED HEALTH CARE EDUCATION/TRAINING PROGRAM

## 2021-06-10 PROCEDURE — 81001 URINALYSIS AUTO W/SCOPE: CPT | Performed by: STUDENT IN AN ORGANIZED HEALTH CARE EDUCATION/TRAINING PROGRAM

## 2021-06-10 RX ORDER — TAMSULOSIN HYDROCHLORIDE 0.4 MG/1
CAPSULE ORAL
Qty: 30 CAPSULE | Refills: 11 | Status: SHIPPED | OUTPATIENT
Start: 2021-06-10 | End: 2021-11-24 | Stop reason: SDUPTHER

## 2021-06-10 NOTE — PROGRESS NOTES
"Subjective:  Orlin Mendes is a 55 y.o. male who presents for establish care.    Patient has history of intellectual disability, currently in a group home, doing well, no acute complaints.  Needs refill on Flomax 0.4 mg, tolerating well, no adverse effects.  Additionally on several psychiatric medicines as.  Pending well, tolerating well, no adverse effects, no SI/HI/AV hallucinations.  Denies urinary complaints, no dysuria, hematuria, abdominal pain.      Patient Active Problem List   Diagnosis   • Intellectual disability   • Prostate hypertrophy   • Gastroesophageal reflux disease   • Preoperative clearance   • Anxiety   • Fever   • Fecal incontinence   • Callus   • Plantar wart of right foot   • Toenail fungus   • Benign prostatic hyperplasia   • Diarrhea   • Rash   • Cough   • Iron deficiency anemia due to chronic blood loss   • Gastroesophageal reflux disease with esophagitis   • History of colon polyps   • Sinusitis   • Generalized abdominal pain   • Intellectual disability   • Morbidly obese (CMS/HCC)     Vitals:    Vitals:    06/10/21 0941   BP: 122/74   Pulse: 55   Resp: 18   SpO2: 99%   Weight: 112 kg (248 lb)   Height: 180.3 cm (71\")     Body mass index is 34.59 kg/m².      Current Outpatient Medications:   •  acetaminophen (TYLENOL) 325 MG tablet, Take 2 tablets by mouth Every 4 (Four) Hours As Needed for Mild Pain ., Disp: 30 tablet, Rfl: 11  •  busPIRone (BUSPAR) 15 MG tablet, Take 15 mg by mouth 3 (Three) Times a Day., Disp: , Rfl:   •  chlorhexidine (PERIDEX) 0.12 % solution, Apply 15 mL to the mouth or throat 2 (Two) Times a Day., Disp: , Rfl:   •  citalopram (CeleXA) 40 MG tablet, Take 40 mg by mouth Daily., Disp: , Rfl:   •  Dextromethorphan-guaiFENesin 5-100 MG/5ML liquid, Take 5 mL by mouth 4 (Four) Times a Day As Needed (for cough)., Disp: 118 mL, Rfl: 11  •  hydrocortisone 1 % ointment, Apply to rash or scrapes, Disp: 56 g, Rfl: 11  •  loperamide (IMODIUM) 2 MG capsule, Take 1 capsule by " mouth 4 (Four) Times a Day As Needed for Diarrhea. Take 1 cap after each loose stool not to exceed 8 per day prn, Disp: 30 capsule, Rfl: 11  •  neomycin-polymyxin-pramoxine (Antibiotic Plus Pain Relief) 1 % cream, Apply  topically to the appropriate area as directed 4 (Four) Times a Day As Needed (prn rash or scraps)., Disp: 28 g, Rfl: 11  •  omeprazole (PrilOSEC) 20 MG capsule, Take 1 capsule by mouth Daily., Disp: 30 capsule, Rfl: 11  •  QUEtiapine (SEROquel) 100 MG tablet, Take 150 mg by mouth Every Night., Disp: , Rfl:   •  tamsulosin (FLOMAX) 0.4 MG capsule 24 hr capsule, Take one capsule by mouth at 7 am daily, Disp: 30 capsule, Rfl: 11  •  thioridazine (MELLARIL) 25 MG tablet, 1 tab at 7 AM, 1 tab at 12 pm and 2 tabs in 7 PM every day, Disp: 210 tablet, Rfl: 11    Patient Active Problem List   Diagnosis   • Intellectual disability   • Prostate hypertrophy   • Gastroesophageal reflux disease   • Preoperative clearance   • Anxiety   • Fever   • Fecal incontinence   • Callus   • Plantar wart of right foot   • Toenail fungus   • Benign prostatic hyperplasia   • Diarrhea   • Rash   • Cough   • Iron deficiency anemia due to chronic blood loss   • Gastroesophageal reflux disease with esophagitis   • History of colon polyps   • Sinusitis   • Generalized abdominal pain   • Intellectual disability   • Morbidly obese (CMS/HCC)     Past Surgical History:   Procedure Laterality Date   • COLONOSCOPY     • COLONOSCOPY N/A 2/16/2018    Procedure: COLONOSCOPY;  Surgeon: Jus Aguilar MD;  Location: [facility] ENDOSCOPY;  Service:    • ENDOSCOPY N/A 10/27/2017    Procedure: ESOPHAGOGASTRODUODENOSCOPY;  Surgeon: Jus Aguilar MD;  Location: [facility] ENDOSCOPY;  Service:    • UPPER GASTROINTESTINAL ENDOSCOPY     • UPPER GASTROINTESTINAL ENDOSCOPY  10/27/2017     Social History     Socioeconomic History   • Marital status: Single     Spouse name: Not on file   • Number of children: Not on file   • Years of education: Not on file    • Highest education level: Not on file   Tobacco Use   • Smoking status: Never Smoker   • Smokeless tobacco: Never Used   Vaping Use   • Vaping Use: Never used   Substance and Sexual Activity   • Alcohol use: No   • Drug use: No   • Sexual activity: Defer     Family History   Problem Relation Age of Onset   • Heart disease Mother    • Diabetes Mother    • Heart disease Father    • Heart disease Sister    • No Known Problems Brother    • No Known Problems Maternal Grandmother    • No Known Problems Maternal Grandfather    • No Known Problems Paternal Grandmother    • No Known Problems Paternal Grandfather    • No Known Problems Sister    • No Known Problems Sister      Lab on 01/18/2021   Component Date Value Ref Range Status   • WBC 01/18/2021 3.81  3.40 - 10.80 10*3/mm3 Final   • RBC 01/18/2021 4.49  4.14 - 5.80 10*6/mm3 Final   • Hemoglobin 01/18/2021 13.7  13.0 - 17.7 g/dL Final   • Hematocrit 01/18/2021 39.6  37.5 - 51.0 % Final   • MCV 01/18/2021 88.2  79.0 - 97.0 fL Final   • MCH 01/18/2021 30.5  26.6 - 33.0 pg Final   • MCHC 01/18/2021 34.6  31.5 - 35.7 g/dL Final   • RDW 01/18/2021 12.1* 12.3 - 15.4 % Final   • RDW-SD 01/18/2021 38.8  37.0 - 54.0 fl Final   • MPV 01/18/2021 11.6  6.0 - 12.0 fL Final   • Platelets 01/18/2021 168  140 - 450 10*3/mm3 Final   • Neutrophil % 01/18/2021 56.7  42.7 - 76.0 % Final   • Lymphocyte % 01/18/2021 31.5  19.6 - 45.3 % Final   • Monocyte % 01/18/2021 9.4  5.0 - 12.0 % Final   • Eosinophil % 01/18/2021 1.3  0.3 - 6.2 % Final   • Basophil % 01/18/2021 0.8  0.0 - 1.5 % Final   • Immature Grans % 01/18/2021 0.3  0.0 - 0.5 % Final   • Neutrophils, Absolute 01/18/2021 2.16  1.70 - 7.00 10*3/mm3 Final   • Lymphocytes, Absolute 01/18/2021 1.20  0.70 - 3.10 10*3/mm3 Final   • Monocytes, Absolute 01/18/2021 0.36  0.10 - 0.90 10*3/mm3 Final   • Eosinophils, Absolute 01/18/2021 0.05  0.00 - 0.40 10*3/mm3 Final   • Basophils, Absolute 01/18/2021 0.03  0.00 - 0.20 10*3/mm3 Final   •  Immature Grans, Absolute 01/18/2021 0.01  0.00 - 0.05 10*3/mm3 Final   • nRBC 01/18/2021 0.0  0.0 - 0.2 /100 WBC Final   • Glucose 01/18/2021 90  65 - 99 mg/dL Final   • BUN 01/18/2021 9  6 - 20 mg/dL Final   • Creatinine 01/18/2021 0.82  0.76 - 1.27 mg/dL Final   • Sodium 01/18/2021 142  136 - 145 mmol/L Final   • Potassium 01/18/2021 4.4  3.5 - 5.2 mmol/L Final   • Chloride 01/18/2021 106  98 - 107 mmol/L Final   • CO2 01/18/2021 26.1  22.0 - 29.0 mmol/L Final   • Calcium 01/18/2021 8.5* 8.6 - 10.5 mg/dL Final   • Total Protein 01/18/2021 6.7  6.0 - 8.5 g/dL Final   • Albumin 01/18/2021 4.10  3.50 - 5.20 g/dL Final   • ALT (SGPT) 01/18/2021 14  1 - 41 U/L Final   • AST (SGOT) 01/18/2021 11  1 - 40 U/L Final   • Alkaline Phosphatase 01/18/2021 74  39 - 117 U/L Final   • Total Bilirubin 01/18/2021 0.2  0.0 - 1.2 mg/dL Final   • eGFR Non  Amer 01/18/2021 98  >60 mL/min/1.73 Final   • Globulin 01/18/2021 2.6  gm/dL Final   • A/G Ratio 01/18/2021 1.6  g/dL Final   • BUN/Creatinine Ratio 01/18/2021 11.0  7.0 - 25.0 Final   • Anion Gap 01/18/2021 9.9  5.0 - 15.0 mmol/L Final      XR Chest PA & Lateral  Narrative: [facility] [place]    Patient Name: LAUREN SEO    ATTENDING:    REFERRING: ZEFERINO MORALES    ORDERING: ZEFERINO MORALES    -----------------------    PROCEDURE: Chest, PA and lateral    Date of exam: 5/6/2019    HISTORY: Cervical lymphadenopathy    PA and lateral views of the chest were obtained. Study is  compared to prior exam of 5/25/2017.    The lungs are satisfactorily expanded and clear of infiltrates or  effusions. The heart size is normal. Mediastinal and hilar  regions appear normal. The vasculature does not appear congested  and costophrenic angles are clear.   Impression: No active disease.    Electronically signed by:  Rizwan Dumont MD  5/6/2019 5:20 PM CDT  Workstation: [id number]      @[email or web address]@  Immunization History   Administered Date(s) Administered   • COVID-19 (MODERNA)  01/15/2021, 02/10/2021   • Flu Vaccine Intradermal Quad 18-64YR 10/29/2007, 11/12/2008, 09/29/2009, 10/18/2010   • Td 04/05/2005     The following portions of the patient's history were reviewed and updated as appropriate: allergies, current medications, past family history, past medical history, past social history, past surgical history and problem list.    PHQ-9 Total Score:             Physical Exam  Constitutional:       General: He is not in acute distress.  HENT:      Head: Normocephalic and atraumatic.      Right Ear: Tympanic membrane and ear canal normal.      Left Ear: Tympanic membrane and ear canal normal.      Mouth/Throat:      Mouth: Mucous membranes are moist.      Pharynx: Oropharynx is clear. No posterior oropharyngeal erythema.   Eyes:      Extraocular Movements: Extraocular movements intact.      Pupils: Pupils are equal, round, and reactive to light.   Cardiovascular:      Rate and Rhythm: Normal rate and regular rhythm.      Heart sounds: Normal heart sounds. No murmur heard.     Pulmonary:      Effort: Pulmonary effort is normal.      Breath sounds: Normal breath sounds.   Abdominal:      General: Bowel sounds are normal.      Palpations: Abdomen is soft.      Tenderness: There is no abdominal tenderness.   Musculoskeletal:         General: No swelling.      Right lower leg: No edema.      Left lower leg: No edema.   Skin:     Coloration: Skin is not jaundiced.      Findings: No rash.   Neurological:      Mental Status: He is alert and oriented to person, place, and time. Mental status is at baseline.   Psychiatric:         Mood and Affect: Mood normal.         Behavior: Behavior normal.       Assessment/Plan    Diagnosis Plan   1. Gastroesophageal reflux disease with esophagitis without hemorrhage     2. Benign prostatic hyperplasia with urinary frequency  tamsulosin (FLOMAX) 0.4 MG capsule 24 hr capsule    Urinalysis With Microscopic - Urine, Clean Catch      Orders Placed This Encounter    Procedures   • Urinalysis without microscopic (no culture) - Urine, Clean Catch     Order Specific Question:   Release to patient     Answer:   Immediate   • Urinalysis, Microscopic Only - Urine, Clean Catch     Order Specific Question:   Release to patient     Answer:   Immediate   • Urinalysis With Microscopic - Urine, Clean Catch     Order Specific Question:   Release to patient     Answer:   Immediate     GERD; recently seen gastroenterology, doing well medications, EGD reassuring, will continue to follow.    BPH; will obtain urinalysis as above, repeat PSA at follow-up appointment in 3 months, no red flags, will continue medication as patient tolerating well, with good relief.          This document has been electronically signed by Ar Galeano MD on Shirley 10, 2021 14:00 CDT

## 2021-08-20 ENCOUNTER — TELEPHONE (OUTPATIENT)
Dept: FAMILY MEDICINE CLINIC | Facility: CLINIC | Age: 56
End: 2021-08-20

## 2021-08-23 ENCOUNTER — OFFICE VISIT (OUTPATIENT)
Dept: FAMILY MEDICINE CLINIC | Facility: CLINIC | Age: 56
End: 2021-08-23

## 2021-08-23 VITALS
BODY MASS INDEX: 33.88 KG/M2 | OXYGEN SATURATION: 99 % | SYSTOLIC BLOOD PRESSURE: 108 MMHG | TEMPERATURE: 97.5 F | DIASTOLIC BLOOD PRESSURE: 72 MMHG | WEIGHT: 242 LBS | HEIGHT: 71 IN | HEART RATE: 71 BPM

## 2021-08-23 DIAGNOSIS — R21 RASH: ICD-10-CM

## 2021-08-23 DIAGNOSIS — Z00.00 MEDICARE ANNUAL WELLNESS VISIT, INITIAL: Primary | ICD-10-CM

## 2021-08-23 DIAGNOSIS — R05.9 COUGH: ICD-10-CM

## 2021-08-23 DIAGNOSIS — R19.7 DIARRHEA, UNSPECIFIED TYPE: ICD-10-CM

## 2021-08-23 DIAGNOSIS — R50.9 FEVER, UNSPECIFIED FEVER CAUSE: ICD-10-CM

## 2021-08-23 PROCEDURE — G0438 PPPS, INITIAL VISIT: HCPCS | Performed by: STUDENT IN AN ORGANIZED HEALTH CARE EDUCATION/TRAINING PROGRAM

## 2021-08-23 RX ORDER — LOPERAMIDE HYDROCHLORIDE 2 MG/1
2 CAPSULE ORAL 4 TIMES DAILY PRN
Qty: 90 CAPSULE | Refills: 5 | Status: SHIPPED | OUTPATIENT
Start: 2021-08-23

## 2021-08-23 RX ORDER — ACETAMINOPHEN 325 MG/1
650 TABLET ORAL EVERY 4 HOURS PRN
Qty: 90 TABLET | Refills: 5 | Status: SHIPPED | OUTPATIENT
Start: 2021-08-23

## 2021-08-23 NOTE — PATIENT INSTRUCTIONS
Medicare Wellness  Personal Prevention Plan of Service     Date of Office Visit:  2021  Encounter Provider:  Ar Galeano MD  Place of Service:  [facility]  Patient Name: Orlin Mendes  :  1965    As part of the Medicare Wellness portion of your visit today, we are providing you with this personalized preventive plan of services (PPPS). This plan is based upon recommendations of the [place] Preventive Services Task Force (USPSTF) and the Advisory Committee on Immunization Practices (ACIP).    This lists the preventive care services that should be considered, and provides dates of when you are due. Items listed as completed are up-to-date and do not require any further intervention.    Health Maintenance   Topic Date Due   • ZOSTER VACCINE (1 of 2) 2021 (Originally 2016)   • HEPATITIS C SCREENING  2022 (Originally 2016)   • INFLUENZA VACCINE  10/01/2021   • DIABETIC EYE EXAM  2022   • ANNUAL WELLNESS VISIT  2022   • TDAP/TD VACCINES (3 - Td or Tdap) 2027   • COLORECTAL CANCER SCREENING  2028   • COVID-19 Vaccine  Completed   • Hepatitis B  Discontinued   • Pneumococcal Vaccine 0-64  Discontinued   • DIABETIC FOOT EXAM  Discontinued   • HEMOGLOBIN A1C  Discontinued   • URINE MICROALBUMIN  Discontinued       No orders of the defined types were placed in this encounter.      No follow-ups on file.

## 2021-08-24 NOTE — PROGRESS NOTES
The ABCs of the Annual Wellness Visit  Subsequent Medicare Wellness Visit    No chief complaint on file.      Subjective   History of Present Illness:  Orlin Mendes is a 56 y.o. male who presents for a Subsequent Medicare Wellness Visit.    HEALTH RISK ASSESSMENT    Recent Hospitalizations:  No hospitalization(s) within the last year.    Current Medical Providers:  Patient Care Team:  Ar Galeano MD as PCP - General (General Practice)  Bhargav Foreman PA-C as Physician Assistant (Gastroenterology)  Brian Cash DPM as Consulting Physician (Podiatry)    Smoking Status:  Social History     Tobacco Use   Smoking Status Never Smoker   Smokeless Tobacco Never Used       Alcohol Consumption:  Social History     Substance and Sexual Activity   Alcohol Use No       Depression Screen:   PHQ-2/PHQ-9 Depression Screening 8/23/2021   Little interest or pleasure in doing things 0   Feeling down, depressed, or hopeless 0   Total Score 0       Fall Risk Screen:  STEADI Fall Risk Assessment has not been completed.    Health Habits and Functional and Cognitive Screening:  Functional & Cognitive Status 8/23/2021   Do you have difficulty preparing food and eating? Yes   Do you have difficulty bathing yourself, getting dressed or grooming yourself? No   Do you have difficulty using the toilet? No   Do you have difficulty moving around from place to place? No   Do you have trouble with steps or getting out of a bed or a chair? No   Current Diet Well Balanced Diet   Dental Exam Up to date   Eye Exam Up to date   Exercise (times per week) 3 times per week   Current Exercises Include Walking   Do you need help using the phone?  Yes   Are you deaf or do you have serious difficulty hearing?  No   Do you need help with transportation? Yes   Do you need help shopping? Yes   Do you need help preparing meals?  Yes   Do you need help with housework?  Yes   Do you need help with laundry? Yes   Do you need help taking your  medications? Yes   Do you need help managing money? Yes   Have you felt unusual stress, anger or loneliness in the last month? No   Who do you live with? Other   If you need help, do you have trouble finding someone available to you? No   Have you been bothered in the last four weeks by sexual problems? No   Do you have difficulty concentrating, remembering or making decisions? Yes         Does the patient have evidence of cognitive impairment? Yes    Asprin use counseling:Does not need ASA (and currently is not on it)    Age-appropriate Screening Schedule:  Refer to the list below for future screening recommendations based on patient's age, sex and/or medical conditions. Orders for these recommended tests are listed in the plan section. The patient has been provided with a written plan.    Health Maintenance   Topic Date Due   • ZOSTER VACCINE (1 of 2) Never done   • INFLUENZA VACCINE  10/01/2021   • DIABETIC EYE EXAM  08/12/2022   • TDAP/TD VACCINES (3 - Td or Tdap) 02/13/2027   • DIABETIC FOOT EXAM  Discontinued   • HEMOGLOBIN A1C  Discontinued   • URINE MICROALBUMIN  Discontinued          The following portions of the patient's history were reviewed and updated as appropriate: allergies, current medications, past family history, past medical history, past social history, past surgical history and problem list.    Outpatient Medications Prior to Visit   Medication Sig Dispense Refill   • busPIRone (BUSPAR) 15 MG tablet Take 15 mg by mouth 3 (Three) Times a Day.     • chlorhexidine (PERIDEX) 0.12 % solution Apply 15 mL to the mouth or throat 2 (Two) Times a Day.     • citalopram (CeleXA) 40 MG tablet Take 40 mg by mouth Daily.     • hydrocortisone 1 % ointment Apply to rash or scrapes 56 g 11   • omeprazole (PrilOSEC) 20 MG capsule Take 1 capsule by mouth Daily. 30 capsule 11   • QUEtiapine (SEROquel) 100 MG tablet Take 150 mg by mouth Every Night.     • tamsulosin (FLOMAX) 0.4 MG capsule 24 hr capsule Take one  capsule by mouth at 7 am daily 30 capsule 11   • thioridazine (MELLARIL) 25 MG tablet 1 tab at 7 AM, 1 tab at 12 pm and 2 tabs in 7 PM every day 210 tablet 11   • acetaminophen (TYLENOL) 325 MG tablet Take 2 tablets by mouth Every 4 (Four) Hours As Needed for Mild Pain . 30 tablet 11   • Dextromethorphan-guaiFENesin 5-100 MG/5ML liquid Take 5 mL by mouth 4 (Four) Times a Day As Needed (for cough). 118 mL 11   • loperamide (IMODIUM) 2 MG capsule Take 1 capsule by mouth 4 (Four) Times a Day As Needed for Diarrhea. Take 1 cap after each loose stool not to exceed 8 per day prn 30 capsule 11   • neomycin-polymyxin-pramoxine (Antibiotic Plus Pain Relief) 1 % cream Apply  topically to the appropriate area as directed 4 (Four) Times a Day As Needed (prn rash or scraps). 28 g 11     No facility-administered medications prior to visit.       Patient Active Problem List   Diagnosis   • Intellectual disability   • Prostate hypertrophy   • Gastroesophageal reflux disease   • Preoperative clearance   • Anxiety   • Fever   • Fecal incontinence   • Callus   • Plantar wart of right foot   • Toenail fungus   • Benign prostatic hyperplasia   • Diarrhea   • Rash   • Cough   • Iron deficiency anemia due to chronic blood loss   • Gastroesophageal reflux disease with esophagitis   • History of colon polyps   • Sinusitis   • Generalized abdominal pain   • Intellectual disability   • Morbidly obese (CMS/[facility])       Advanced Care Planning:  ACP discussion was declined by the patient. Patient does not have an advance directive, declines further assistance.    Review of Systems    Compared to one year ago, the patient feels his physical health is the same.  Compared to one year ago, the patient feels his mental health is the same.    Reviewed chart for potential of high risk medication in the elderly: yes  Reviewed chart for potential of harmful drug interactions in the elderly:yes    Objective         Vitals:    08/23/21 1603   BP: 108/72   BP  "Location: Right arm   Patient Position: Sitting   Cuff Size: Large Adult   Pulse: 71   Temp: 97.5 °F (36.4 °C)   SpO2: 99%   Weight: 110 kg (242 lb)   Height: 180.3 cm (71\")   PainSc: 0-No pain       Body mass index is 33.75 kg/m².  Discussed the patient's BMI with him. The BMI is above average; no BMI management plan is appropriate..    Physical Exam          Assessment/Plan   Medicare Risks and Personalized Health Plan  CMS Preventative Services Quick Reference  Dementia/Memory   Depression/Dysphoria  Diabetic Lab Screening   Fall Risk    The above risks/problems have been discussed with the patient.  Pertinent information has been shared with the patient in the After Visit Summary.  Follow up plans and orders are seen below in the Assessment/Plan Section.    Diagnoses and all orders for this visit:    1. Medicare annual wellness visit, initial (Primary)    2. Cough  -     Dextromethorphan-guaiFENesin 5-100 MG/5ML liquid; Take 5 mL by mouth 4 (Four) Times a Day As Needed (for cough).  Dispense: 118 mL; Refill: 11    3. Rash  -     neomycin-polymyxin-pramoxine (Antibiotic Plus Pain Relief) 1 % cream; Apply  topically to the appropriate area as directed 4 (Four) Times a Day As Needed (prn rash or scraps).  Dispense: 28 g; Refill: 11    4. Fever, unspecified fever cause  -     acetaminophen (TYLENOL) 325 MG tablet; Take 2 tablets by mouth Every 4 (Four) Hours As Needed for Mild Pain .  Dispense: 90 tablet; Refill: 5    5. Diarrhea, unspecified type  -     loperamide (IMODIUM) 2 MG capsule; Take 1 capsule by mouth 4 (Four) Times a Day As Needed for Diarrhea. Take 1 cap after each loose stool not to exceed 8 per day prn  Dispense: 90 capsule; Refill: 5      Follow Up:  No follow-ups on file.     An After Visit Summary and PPPS were given to the patient.               "

## 2021-09-07 ENCOUNTER — OFFICE VISIT (OUTPATIENT)
Dept: FAMILY MEDICINE CLINIC | Facility: CLINIC | Age: 56
End: 2021-09-07

## 2021-09-07 VITALS
BODY MASS INDEX: 34.07 KG/M2 | WEIGHT: 243.4 LBS | OXYGEN SATURATION: 99 % | HEART RATE: 77 BPM | TEMPERATURE: 98 F | DIASTOLIC BLOOD PRESSURE: 76 MMHG | SYSTOLIC BLOOD PRESSURE: 110 MMHG | HEIGHT: 71 IN

## 2021-09-07 DIAGNOSIS — Z00.00 ENCOUNTER FOR WELLNESS EXAMINATION IN ADULT: Primary | ICD-10-CM

## 2021-09-07 PROCEDURE — 99396 PREV VISIT EST AGE 40-64: CPT | Performed by: STUDENT IN AN ORGANIZED HEALTH CARE EDUCATION/TRAINING PROGRAM

## 2021-09-07 NOTE — PROGRESS NOTES
"Subjective   Chief Complaint   Patient presents with   • Annual Exam     Orlin Mendes is a 56 y.o. year old No obstetric history on file. presenting to be seen for his annual exam.      Concerns: none    The natural history of prostate cancer and ongoing controversy regarding screening and potential treatment outcomes of prostate cancer has been discussed with the patient. The meaning of a false positive PSA and a false negative PSA has been discussed. He indicates understanding of the limitations of this screening test and wishes not to proceed with screening PSA testing.    Last colonoscopy or FIT test: 2018  .  Immunization status: up to date and documented, missing doses of shingles vaccine.    No Additional Complaints Reported    The following portions of the patient's history were reviewed and updated as appropriate:problem list, current medications, allergies, past family history, past medical history, past social history and past surgical history.    Social History    Tobacco Use      Smoking status: Never Smoker      Smokeless tobacco: Never Used    Review of Systems   Unable to perform ROS: Patient nonverbal         Objective   /76 (BP Location: Left arm, Patient Position: Sitting, Cuff Size: Large Adult)   Pulse 77   Temp 98 °F (36.7 °C)   Ht 180.3 cm (71\")   Wt 110 kg (243 lb 6.4 oz)   SpO2 99%   BMI 33.95 kg/m²     General:  well developed; well nourished  no acute distress   Skin:  No suspicious lesions seen   Thyroid: normal to inspection and palpation   Breasts:  Examined in supine position  Symmetric without masses or skin dimpling  Nipples normal without inversion, lesions or discharge  There are no palpable axillary nodes   Abdomen: soft, non-tender; no masses  no umbilical or inguinal hernias are present  no hepato-splenomegaly   Psych: alert and repetitive speech.    Pelvis: Not performed.     Imaging  No data reviewed       Assessment   1. 56-year-old autistic male, no acute " complaints, no acute findings on exam.  Up-to-date on vaccines with exception of shingles.  Unclear if has history of chickenpox.  Vital stable, behavior at baseline.      Plan   1. Reassuring wellness exam, consider shingles vaccine at follow-up in 6 months.    No orders of the defined types were placed in this encounter.         This note was electronically signed.

## 2021-09-23 ENCOUNTER — LAB (OUTPATIENT)
Dept: LAB | Facility: HOSPITAL | Age: 56
End: 2021-09-23

## 2021-09-23 DIAGNOSIS — R10.84 GENERALIZED ABDOMINAL PAIN: ICD-10-CM

## 2021-09-23 DIAGNOSIS — K21.00 GASTROESOPHAGEAL REFLUX DISEASE WITH ESOPHAGITIS WITHOUT HEMORRHAGE: ICD-10-CM

## 2021-09-23 DIAGNOSIS — K57.30 DIVERTICULOSIS OF LARGE INTESTINE WITHOUT HEMORRHAGE: ICD-10-CM

## 2021-09-23 DIAGNOSIS — E61.1 LOW IRON: ICD-10-CM

## 2021-09-23 LAB
ALBUMIN SERPL-MCNC: 4.2 G/DL (ref 3.5–5.2)
ALBUMIN/GLOB SERPL: 1.7 G/DL
ALP SERPL-CCNC: 65 U/L (ref 39–117)
ALT SERPL W P-5'-P-CCNC: 13 U/L (ref 1–41)
ANION GAP SERPL CALCULATED.3IONS-SCNC: 9.1 MMOL/L (ref 5–15)
AST SERPL-CCNC: 13 U/L (ref 1–40)
BASOPHILS # BLD AUTO: 0.03 10*3/MM3 (ref 0–0.2)
BASOPHILS NFR BLD AUTO: 0.8 % (ref 0–1.5)
BILIRUB SERPL-MCNC: 0.3 MG/DL (ref 0–1.2)
BUN SERPL-MCNC: 10 MG/DL (ref 6–20)
BUN/CREAT SERPL: 10.1 (ref 7–25)
CALCIUM SPEC-SCNC: 9.1 MG/DL (ref 8.6–10.5)
CHLORIDE SERPL-SCNC: 106 MMOL/L (ref 98–107)
CO2 SERPL-SCNC: 27.9 MMOL/L (ref 22–29)
CREAT SERPL-MCNC: 0.99 MG/DL (ref 0.76–1.27)
DEPRECATED RDW RBC AUTO: 40.9 FL (ref 37–54)
EOSINOPHIL # BLD AUTO: 0.12 10*3/MM3 (ref 0–0.4)
EOSINOPHIL NFR BLD AUTO: 3.1 % (ref 0.3–6.2)
ERYTHROCYTE [DISTWIDTH] IN BLOOD BY AUTOMATED COUNT: 12.6 % (ref 12.3–15.4)
FERRITIN SERPL-MCNC: 215 NG/ML (ref 30–400)
GFR SERPL CREATININE-BSD FRML MDRD: 78 ML/MIN/1.73
GLOBULIN UR ELPH-MCNC: 2.5 GM/DL
GLUCOSE SERPL-MCNC: 112 MG/DL (ref 65–99)
HCT VFR BLD AUTO: 40.6 % (ref 37.5–51)
HGB BLD-MCNC: 14 G/DL (ref 13–17.7)
IMM GRANULOCYTES # BLD AUTO: 0.01 10*3/MM3 (ref 0–0.05)
IMM GRANULOCYTES NFR BLD AUTO: 0.3 % (ref 0–0.5)
IRON 24H UR-MRATE: 79 MCG/DL (ref 59–158)
IRON SATN MFR SERPL: 21 % (ref 20–50)
LYMPHOCYTES # BLD AUTO: 1.24 10*3/MM3 (ref 0.7–3.1)
LYMPHOCYTES NFR BLD AUTO: 32.2 % (ref 19.6–45.3)
MCH RBC QN AUTO: 30.8 PG (ref 26.6–33)
MCHC RBC AUTO-ENTMCNC: 34.5 G/DL (ref 31.5–35.7)
MCV RBC AUTO: 89.2 FL (ref 79–97)
MONOCYTES # BLD AUTO: 0.31 10*3/MM3 (ref 0.1–0.9)
MONOCYTES NFR BLD AUTO: 8.1 % (ref 5–12)
NEUTROPHILS NFR BLD AUTO: 2.14 10*3/MM3 (ref 1.7–7)
NEUTROPHILS NFR BLD AUTO: 55.5 % (ref 42.7–76)
NRBC BLD AUTO-RTO: 0 /100 WBC (ref 0–0.2)
PLATELET # BLD AUTO: 154 10*3/MM3 (ref 140–450)
PMV BLD AUTO: 11.5 FL (ref 6–12)
POTASSIUM SERPL-SCNC: 4.3 MMOL/L (ref 3.5–5.2)
PROT SERPL-MCNC: 6.7 G/DL (ref 6–8.5)
RBC # BLD AUTO: 4.55 10*6/MM3 (ref 4.14–5.8)
SODIUM SERPL-SCNC: 143 MMOL/L (ref 136–145)
TIBC SERPL-MCNC: 384 MCG/DL (ref 298–536)
TRANSFERRIN SERPL-MCNC: 258 MG/DL (ref 200–360)
WBC # BLD AUTO: 3.85 10*3/MM3 (ref 3.4–10.8)

## 2021-09-23 PROCEDURE — 84466 ASSAY OF TRANSFERRIN: CPT

## 2021-09-23 PROCEDURE — 82728 ASSAY OF FERRITIN: CPT

## 2021-09-23 PROCEDURE — 83540 ASSAY OF IRON: CPT

## 2021-09-23 PROCEDURE — 80053 COMPREHEN METABOLIC PANEL: CPT

## 2021-09-23 PROCEDURE — 85025 COMPLETE CBC W/AUTO DIFF WBC: CPT

## 2021-09-29 ENCOUNTER — OFFICE VISIT (OUTPATIENT)
Dept: GASTROENTEROLOGY | Facility: CLINIC | Age: 56
End: 2021-09-29

## 2021-09-29 VITALS
BODY MASS INDEX: 33.49 KG/M2 | HEIGHT: 71 IN | SYSTOLIC BLOOD PRESSURE: 131 MMHG | HEART RATE: 83 BPM | DIASTOLIC BLOOD PRESSURE: 74 MMHG | WEIGHT: 239.2 LBS

## 2021-09-29 DIAGNOSIS — K21.00 GASTROESOPHAGEAL REFLUX DISEASE WITH ESOPHAGITIS WITHOUT HEMORRHAGE: Primary | ICD-10-CM

## 2021-09-29 DIAGNOSIS — K57.30 DIVERTICULOSIS OF LARGE INTESTINE WITHOUT HEMORRHAGE: ICD-10-CM

## 2021-09-29 PROCEDURE — 99213 OFFICE O/P EST LOW 20 MIN: CPT | Performed by: PHYSICIAN ASSISTANT

## 2021-11-24 DIAGNOSIS — R35.0 BENIGN PROSTATIC HYPERPLASIA WITH URINARY FREQUENCY: ICD-10-CM

## 2021-11-24 DIAGNOSIS — N40.1 BENIGN PROSTATIC HYPERPLASIA WITH URINARY FREQUENCY: ICD-10-CM

## 2021-11-24 RX ORDER — TAMSULOSIN HYDROCHLORIDE 0.4 MG/1
CAPSULE ORAL
Qty: 30 CAPSULE | Refills: 11 | Status: SHIPPED | OUTPATIENT
Start: 2021-11-24

## 2021-11-24 RX ORDER — TAMSULOSIN HYDROCHLORIDE 0.4 MG/1
CAPSULE ORAL
Qty: 30 CAPSULE | Refills: 11 | Status: SHIPPED | OUTPATIENT
Start: 2021-11-24 | End: 2021-11-24 | Stop reason: SDUPTHER

## 2021-11-24 NOTE — TELEPHONE ENCOUNTER
Incoming Refill Request      Medication requested (name and dose): tamsulosin (FLOMAX) 0.4 MG capsule 24 hr capsule    Pharmacy where request should be sent: [facility]    Additional details provided by patient: NO    Best call back number: [phone]    Does the patient have less than a 3 day supply:  [x] Yes  [] No    Susu Feliciano  11/24/21, 08:30 CST

## 2021-11-24 NOTE — TELEPHONE ENCOUNTER
Clarissa, pt's caregiver, called and said that she has a script in her hand that states there are only 5 refills. Sent in new script. Faxed to [facility]'s pharmacy and Clarissa requested a copy of it as well. Faxed to [phone].    Clarissa called back. I forgot that Dr Galeano has to sign the printed scripts. Talked it over with her and she said I could send it electronically to [facility] and then they will send her a copy of what they receive. I told her that was probably the easiest way to do it.    Sent in new script.

## 2021-11-24 NOTE — TELEPHONE ENCOUNTER
Rx Refill Note  Requested Prescriptions     Pending Prescriptions Disp Refills   • tamsulosin (FLOMAX) 0.4 MG capsule 24 hr capsule 30 capsule 11     Sig: Take one capsule by mouth at 7 am daily      Last office visit with prescribing clinician: 9/7/2021      Next office visit with prescribing clinician: 3/7/2022            Halle Uriostegui Rep  11/24/21, 08:37 CST     Last script was sent in on 6/10/21 for a 30 day supply with 11 refills. Pt should have plenty of refills, he just needs to call the pharmacy. I called and left a VM at pharmacy asking for them to get the medication ready for him and to call me if there is a problem with the script.

## 2022-11-29 ENCOUNTER — OFFICE VISIT (OUTPATIENT)
Dept: GASTROENTEROLOGY | Facility: CLINIC | Age: 57
End: 2022-11-29

## 2022-11-29 VITALS
WEIGHT: 238 LBS | BODY MASS INDEX: 33.32 KG/M2 | SYSTOLIC BLOOD PRESSURE: 136 MMHG | HEIGHT: 71 IN | DIASTOLIC BLOOD PRESSURE: 84 MMHG

## 2022-11-29 DIAGNOSIS — K57.30 DIVERTICULOSIS OF LARGE INTESTINE WITHOUT HEMORRHAGE: ICD-10-CM

## 2022-11-29 DIAGNOSIS — Z12.11 ENCOUNTER FOR SCREENING FOR MALIGNANT NEOPLASM OF COLON: ICD-10-CM

## 2022-11-29 DIAGNOSIS — K21.00 GASTROESOPHAGEAL REFLUX DISEASE WITH ESOPHAGITIS WITHOUT HEMORRHAGE: Primary | ICD-10-CM

## 2022-11-29 PROCEDURE — 99213 OFFICE O/P EST LOW 20 MIN: CPT | Performed by: PHYSICIAN ASSISTANT

## 2022-11-29 RX ORDER — DEXTROSE AND SODIUM CHLORIDE 5; .45 G/100ML; G/100ML
30 INJECTION, SOLUTION INTRAVENOUS CONTINUOUS PRN
Status: CANCELLED | OUTPATIENT
Start: 2023-02-13

## 2023-02-07 RX ORDER — POLYETHYLENE GLYCOL 3350 17 G/17G
17 POWDER, FOR SOLUTION ORAL DAILY
Qty: 255 G | Refills: 0 | Status: SHIPPED | OUTPATIENT
Start: 2023-02-07

## 2023-02-07 RX ORDER — BISACODYL 5 MG
TABLET, DELAYED RELEASE (ENTERIC COATED) ORAL
Qty: 2 TABLET | Refills: 0 | Status: SHIPPED | OUTPATIENT
Start: 2023-02-07

## 2023-02-13 ENCOUNTER — HOSPITAL ENCOUNTER (OUTPATIENT)
Facility: HOSPITAL | Age: 58
Setting detail: HOSPITAL OUTPATIENT SURGERY
Discharge: HOME | End: 2023-02-13
Attending: INTERNAL MEDICINE | Admitting: INTERNAL MEDICINE
Payer: MEDICARE

## 2023-02-13 ENCOUNTER — ANESTHESIA (OUTPATIENT)
Dept: GASTROENTEROLOGY | Facility: HOSPITAL | Age: 58
End: 2023-02-13
Payer: MEDICARE

## 2023-02-13 ENCOUNTER — ANESTHESIA EVENT (OUTPATIENT)
Dept: GASTROENTEROLOGY | Facility: HOSPITAL | Age: 58
End: 2023-02-13
Payer: MEDICARE

## 2023-02-13 VITALS
WEIGHT: 230 LBS | HEART RATE: 66 BPM | TEMPERATURE: 97.7 F | DIASTOLIC BLOOD PRESSURE: 71 MMHG | RESPIRATION RATE: 16 BRPM | OXYGEN SATURATION: 99 % | BODY MASS INDEX: 32.08 KG/M2 | SYSTOLIC BLOOD PRESSURE: 113 MMHG

## 2023-02-13 DIAGNOSIS — Z12.11 ENCOUNTER FOR SCREENING FOR MALIGNANT NEOPLASM OF COLON: ICD-10-CM

## 2023-02-13 PROCEDURE — 25010000002 GLUCAGON (HUMAN RECOMBINANT) 1 MG RECONSTITUTED SOLUTION: Performed by: NURSE ANESTHETIST, CERTIFIED REGISTERED

## 2023-02-13 PROCEDURE — G0121 COLON CA SCRN NOT HI RSK IND: HCPCS | Performed by: INTERNAL MEDICINE

## 2023-02-13 PROCEDURE — 25010000002 PROPOFOL 10 MG/ML EMULSION: Performed by: NURSE ANESTHETIST, CERTIFIED REGISTERED

## 2023-02-13 RX ORDER — PROPOFOL 10 MG/ML
VIAL (ML) INTRAVENOUS AS NEEDED
Status: DISCONTINUED | OUTPATIENT
Start: 2023-02-13 | End: 2023-02-13 | Stop reason: SURG

## 2023-02-13 RX ORDER — ONDANSETRON 2 MG/ML
4 INJECTION INTRAMUSCULAR; INTRAVENOUS ONCE AS NEEDED
Status: DISCONTINUED | OUTPATIENT
Start: 2023-02-13 | End: 2023-02-13 | Stop reason: HOSPADM

## 2023-02-13 RX ORDER — DEXTROSE AND SODIUM CHLORIDE 5; .45 G/100ML; G/100ML
30 INJECTION, SOLUTION INTRAVENOUS CONTINUOUS PRN
Status: DISCONTINUED | OUTPATIENT
Start: 2023-02-13 | End: 2023-02-13 | Stop reason: HOSPADM

## 2023-02-13 RX ADMIN — PROPOFOL 60 MG: 10 INJECTION, EMULSION INTRAVENOUS at 11:22

## 2023-02-13 RX ADMIN — DEXTROSE AND SODIUM CHLORIDE 30 ML/HR: 5; 450 INJECTION, SOLUTION INTRAVENOUS at 10:45

## 2023-02-13 RX ADMIN — PROPOFOL 20 MG: 10 INJECTION, EMULSION INTRAVENOUS at 11:30

## 2023-02-13 RX ADMIN — GLUCAGON HYDROCHLORIDE 0.5 MG: KIT at 11:25

## 2023-02-13 RX ADMIN — PROPOFOL 20 MG: 10 INJECTION, EMULSION INTRAVENOUS at 11:27

## 2023-02-13 NOTE — H&P
No chief complaint on file.      ENDO PROCEDURE ORDERED: COLON screen    Subjective    Orlin Mendes is a 57 y.o. male. he is here today for follow-up.    History of Present Illness  I agree with the current note with no changes in the history.    Patient seen on a recheck of his GERD, abdominal pain. Last seen 09/29/2021. He is accompanied by caretaker. GERD is doing well on the Prilosec 20 mg daily. Denied nausea, vomiting, dysphagia, bowels are moving without blood or mucus. Weight is down 1.3 pounds since last visit. Last EGD 10/27/2017. Last colonoscopy showed diverticular disease 02/16/2018. No recent laboratories.     A/P: Patient will be due for screening colonoscopy in February. This was scheduled. Will repeat laboratories if not done prior to next follow-up. He is doing well on the Prilosec. Completed his forms for the facility. Will plan further pending clinical course and the results of the above.         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 35-49) 01/21/2016   • Onychomycosis      Past Surgical History:   Procedure Laterality Date   • COLONOSCOPY     • COLONOSCOPY N/A 2/16/2018    Procedure: COLONOSCOPY;  Surgeon: Jus Aguilar MD;  Location: [facility] ENDOSCOPY;  Service:    • ENDOSCOPY N/A 10/27/2017    Procedure: ESOPHAGOGASTRODUODENOSCOPY;  Surgeon: Jus Aguilar MD;  Location: [facility] ENDOSCOPY;  Service:    • UPPER GASTROINTESTINAL ENDOSCOPY     • UPPER GASTROINTESTINAL ENDOSCOPY  10/27/2017     Family History   Problem  Relation Age of Onset   • Heart disease Mother    • Diabetes Mother    • Heart disease Father    • Heart disease Sister    • No Known Problems Brother    • No Known Problems Maternal Grandmother    • No Known Problems Maternal Grandfather    • No Known Problems Paternal Grandmother    • No Known Problems Paternal Grandfather    • No Known Problems Sister    • No Known Problems Sister        No Known Allergies  Social History     Socioeconomic History   • Marital status: Single   Tobacco Use   • Smoking status: Never   • Smokeless tobacco: Never   Vaping Use   • Vaping Use: Never used   Substance and Sexual Activity   • Alcohol use: No   • Drug use: No   • Sexual activity: Defer     Current Medications:  Prior to Admission medications    Medication Sig Start Date End Date Taking? Authorizing Provider   acetaminophen (TYLENOL) 325 MG tablet Take 2 tablets by mouth Every 4 (Four) Hours As Needed for Mild Pain . 8/23/21  Yes Ar Galeano MD   busPIRone (BUSPAR) 15 MG tablet Take 15 mg by mouth 3 (Three) Times a Day.   Yes ProviderAlessandra MD   chlorhexidine (PERIDEX) 0.12 % solution Apply 15 mL to the mouth or throat 2 (Two) Times a Day.   Yes ProviderAlessandra MD   citalopram (CeleXA) 40 MG tablet Take 20 mg by mouth Daily.   Yes ProviderAlessandra MD   hydrocortisone 1 % ointment Apply to rash or scrapes 9/14/18  Yes Juan Hunter APRN   neomycin-polymyxin-pramoxine (Antibiotic Plus Pain Relief) 1 % cream Apply  topically to the appropriate area as directed 4 (Four) Times a Day As Needed (prn rash or scraps). 8/23/21  Yes Ar Galeano MD   omeprazole (PrilOSEC) 20 MG capsule Take 1 capsule by mouth Daily. 2/22/21  Yes Juan Hunter APRN   QUEtiapine (SEROquel) 100 MG tablet Take 150 mg by mouth Every Night.   Yes ProviderAlessandra MD   tamsulosin (FLOMAX) 0.4 MG capsule 24 hr capsule Take one capsule by mouth at 7 am daily 11/24/21  Yes Ar Galeano MD   thioridazine  (MELLARIL) 25 MG tablet 1 tab at 7 AM, 1 tab at 12 pm and 2 tabs in 7 PM every day 1/8/21  Yes Juan Hunter, [place]   Dextromethorphan-guaiFENesin 5-100 MG/5ML liquid Take 5 mL by mouth 4 (Four) Times a Day As Needed (for cough). 8/23/21   Ar Galeano MD   loperamide (IMODIUM) 2 MG capsule Take 1 capsule by mouth 4 (Four) Times a Day As Needed for Diarrhea. Take 1 cap after each loose stool not to exceed 8 per day prn 8/23/21   Ar Galeano MD     Review of Systems  Review of Systems   Constitutional: Negative for unexpected weight change.   HENT: Negative for trouble swallowing.    Gastrointestinal: Positive for abdominal pain. Negative for abdominal distention, anal bleeding, blood in stool, constipation, diarrhea, nausea, rectal pain and vomiting.          Objective    /86   Pulse 70   Temp 97.6 °F (36.4 °C)   Resp 18   Wt 104 kg (230 lb)   SpO2 97%   BMI 32.08 kg/m²   Physical Exam  Vitals and nursing note reviewed.   Constitutional:       General: He is not in acute distress.     Appearance: He is well-developed. He is obese.   HENT:      Head: Normocephalic and atraumatic.   Eyes:      Pupils: Pupils are equal, round, and reactive to light.   Cardiovascular:      Rate and Rhythm: Normal rate and regular rhythm.      Heart sounds: Normal heart sounds.   Pulmonary:      Effort: Pulmonary effort is normal.      Breath sounds: Normal breath sounds.   Abdominal:      General: Bowel sounds are normal. There is no distension or abdominal bruit.      Palpations: Abdomen is soft. Abdomen is not rigid. There is no shifting dullness or mass.      Tenderness: There is abdominal tenderness. There is no guarding or rebound.      Hernia: No hernia is present. There is no hernia in the ventral area.   Musculoskeletal:         General: Normal range of motion.      Cervical back: Normal range of motion.   Skin:     General: Skin is warm and dry.   Neurological:      Mental Status: He is alert and  oriented to person, place, and time.   Psychiatric:         Behavior: Behavior normal.         Thought Content: Thought content normal.         Judgment: Judgment normal.       Assessment & Plan      1. Encounter for screening for malignant neoplasm of colon    .   Diagnoses and all orders for this visit:    1. Encounter for screening for malignant neoplasm of colon  -     dextrose 5 % and sodium chloride 0.45 % infusion    Other orders  -     Obtain Informed Consent; Standing  -     POC Glucose Once; Standing  -     Obtain Informed Consent  -     POC Glucose Once        Orders placed during this encounter include:  Orders Placed This Encounter   Procedures   • Obtain Informed Consent     Standing Status:   Standing     Number of Occurrences:   1     Order Specific Question:   Informed Consent Given For     Answer:   COLONOSCOPY   • POC Glucose Once     Prior to Procedure on ALL Diabetic Patients     Standing Status:   Standing     Number of Occurrences:   1     Order Specific Question:   Release to patient     Answer:   Routine Release       Medications prescribed:  New Medications Ordered This Visit   Medications   • dextrose 5 % and sodium chloride 0.45 % infusion       Requested Prescriptions      No prescriptions requested or ordered in this encounter       Review and/or summary of lab tests, radiology, procedures, medications. Review and summary of old records and obtaining of history. The risks and benefits of my recommendations, as well as other treatment options were discussed with the patient today. Questions were answered.    Follow-up: No follow-ups on file.       COLONOSCOPY (N/A)      This document has been electronically signed by Jus Aguilar MD on February 13, 2023 10:49 CST      Results for orders placed or performed in visit on 09/23/21   CBC Auto Differential    Specimen: Blood   Result Value Ref Range    WBC 3.85 3.40 - 10.80 10*3/mm3    RBC 4.55 4.14 - 5.80 10*6/mm3    Hemoglobin 14.0 13.0  - 17.7 g/dL    Hematocrit 40.6 37.5 - 51.0 %    MCV 89.2 79.0 - 97.0 fL    MCH 30.8 26.6 - 33.0 pg    MCHC 34.5 31.5 - 35.7 g/dL    RDW 12.6 12.3 - 15.4 %    RDW-SD 40.9 37.0 - 54.0 fl    MPV 11.5 6.0 - 12.0 fL    Platelets 154 140 - 450 10*3/mm3    Neutrophil % 55.5 42.7 - 76.0 %    Lymphocyte % 32.2 19.6 - 45.3 %    Monocyte % 8.1 5.0 - 12.0 %    Eosinophil % 3.1 0.3 - 6.2 %    Basophil % 0.8 0.0 - 1.5 %    Immature Grans % 0.3 0.0 - 0.5 %    Neutrophils, Absolute 2.14 1.70 - 7.00 10*3/mm3    Lymphocytes, Absolute 1.24 0.70 - 3.10 10*3/mm3    Monocytes, Absolute 0.31 0.10 - 0.90 10*3/mm3    Eosinophils, Absolute 0.12 0.00 - 0.40 10*3/mm3    Basophils, Absolute 0.03 0.00 - 0.20 10*3/mm3    Immature Grans, Absolute 0.01 0.00 - 0.05 10*3/mm3    nRBC 0.0 0.0 - 0.2 /100 WBC   Iron Profile    Specimen: Blood   Result Value Ref Range    Iron 79 59 - 158 mcg/dL    Iron Saturation 21 20 - 50 %    Transferrin 258 200 - 360 mg/dL    TIBC 384 298 - 536 mcg/dL   Ferritin    Specimen: Blood   Result Value Ref Range    Ferritin 215.00 30.00 - 400.00 ng/mL   Comprehensive Metabolic Panel    Specimen: Blood   Result Value Ref Range    Glucose 112 (H) 65 - 99 mg/dL    BUN 10 6 - 20 mg/dL    Creatinine 0.99 0.76 - 1.27 mg/dL    Sodium 143 136 - 145 mmol/L    Potassium 4.3 3.5 - 5.2 mmol/L    Chloride 106 98 - 107 mmol/L    CO2 27.9 22.0 - 29.0 mmol/L    Calcium 9.1 8.6 - 10.5 mg/dL    Total Protein 6.7 6.0 - 8.5 g/dL    Albumin 4.20 3.50 - 5.20 g/dL    ALT (SGPT) 13 1 - 41 U/L    AST (SGOT) 13 1 - 40 U/L    Alkaline Phosphatase 65 39 - 117 U/L    Total Bilirubin 0.3 0.0 - 1.2 mg/dL    eGFR Non African Amer 78 >60 mL/min/1.73    Globulin 2.5 gm/dL    A/G Ratio 1.7 g/dL    BUN/Creatinine Ratio 10.1 7.0 - 25.0    Anion Gap 9.1 5.0 - 15.0 mmol/L   Results for orders placed or performed in visit on 06/10/21   Urinalysis, Microscopic Only - Urine, Clean Catch    Specimen: Urine, Clean Catch   Result Value Ref Range    RBC, UA 0-2 None  Seen, 0-2 /HPF    WBC, UA 0-2 None Seen, 0-2 /HPF    Bacteria, UA None Seen None Seen /HPF    Squamous Epithelial Cells, UA 0-2 None Seen, 0-2 /HPF    Hyaline Casts, UA None Seen None Seen /LPF    Methodology Automated Microscopy    Urinalysis without microscopic (no culture) - Urine, Clean Catch    Specimen: Urine, Clean Catch   Result Value Ref Range    Color, UA Yellow Yellow, Straw    Appearance, UA Clear Clear    pH, UA 6.5 5.0 - 8.0    Specific Gravity, UA 1.014 1.005 - 1.030    Glucose, UA Negative Negative    Ketones, UA Negative Negative    Bilirubin, UA Negative Negative    Blood, UA Negative Negative    Protein, UA Negative Negative    Leuk Esterase, UA Negative Negative    Nitrite, UA Negative Negative    Urobilinogen, UA 0.2 E.U./dL 0.2 - 1.0 E.U./dL   Results for orders placed or performed in visit on 01/18/21   CBC Auto Differential    Specimen: Blood   Result Value Ref Range    WBC 3.81 3.40 - 10.80 10*3/mm3    RBC 4.49 4.14 - 5.80 10*6/mm3    Hemoglobin 13.7 13.0 - 17.7 g/dL    Hematocrit 39.6 37.5 - 51.0 %    MCV 88.2 79.0 - 97.0 fL    MCH 30.5 26.6 - 33.0 pg    MCHC 34.6 31.5 - 35.7 g/dL    RDW 12.1 (L) 12.3 - 15.4 %    RDW-SD 38.8 37.0 - 54.0 fl    MPV 11.6 6.0 - 12.0 fL    Platelets 168 140 - 450 10*3/mm3    Neutrophil % 56.7 42.7 - 76.0 %    Lymphocyte % 31.5 19.6 - 45.3 %    Monocyte % 9.4 5.0 - 12.0 %    Eosinophil % 1.3 0.3 - 6.2 %    Basophil % 0.8 0.0 - 1.5 %    Immature Grans % 0.3 0.0 - 0.5 %    Neutrophils, Absolute 2.16 1.70 - 7.00 10*3/mm3    Lymphocytes, Absolute 1.20 0.70 - 3.10 10*3/mm3    Monocytes, Absolute 0.36 0.10 - 0.90 10*3/mm3    Eosinophils, Absolute 0.05 0.00 - 0.40 10*3/mm3    Basophils, Absolute 0.03 0.00 - 0.20 10*3/mm3    Immature Grans, Absolute 0.01 0.00 - 0.05 10*3/mm3    nRBC 0.0 0.0 - 0.2 /100 WBC   Comprehensive Metabolic Panel    Specimen: Blood   Result Value Ref Range    Glucose 90 65 - 99 mg/dL    BUN 9 6 - 20 mg/dL    Creatinine 0.82 0.76 - 1.27 mg/dL     Sodium 142 136 - 145 mmol/L    Potassium 4.4 3.5 - 5.2 mmol/L    Chloride 106 98 - 107 mmol/L    CO2 26.1 22.0 - 29.0 mmol/L    Calcium 8.5 (L) 8.6 - 10.5 mg/dL    Total Protein 6.7 6.0 - 8.5 g/dL    Albumin 4.10 3.50 - 5.20 g/dL    ALT (SGPT) 14 1 - 41 U/L    AST (SGOT) 11 1 - 40 U/L    Alkaline Phosphatase 74 39 - 117 U/L    Total Bilirubin 0.2 0.0 - 1.2 mg/dL    eGFR Non African Amer 98 >60 mL/min/1.73    Globulin 2.6 gm/dL    A/G Ratio 1.6 g/dL    BUN/Creatinine Ratio 11.0 7.0 - 25.0    Anion Gap 9.9 5.0 - 15.0 mmol/L     *Note: Due to a large number of results and/or encounters for the requested time period, some results have not been displayed. A complete set of results can be found in Results Review.

## 2023-02-13 NOTE — ANESTHESIA POSTPROCEDURE EVALUATION
Patient: Orlin Mendes    Procedure Summary     Date: 02/13/23 Room / Location: [facility] ENDOSCOPY 1 / [facility] ENDOSCOPY    Anesthesia Start: 1111 Anesthesia Stop: 1130    Procedure: COLONOSCOPY Diagnosis:       Encounter for screening for malignant neoplasm of colon      (Encounter for screening for malignant neoplasm of colon [Z12.11])    Surgeons: Jus Aguilar MD Provider: Lorie James CRNA    Anesthesia Type: general ASA Status: 3          Anesthesia Type: general    Vitals  No vitals data found for the desired time range.          Post Anesthesia Care and Evaluation    Patient location during evaluation: bedside  Patient participation: waiting for patient participation  Level of consciousness: sleepy but conscious  Pain score: 0  Pain management: adequate    Airway patency: patent  Anesthetic complications: No anesthetic complications  PONV Status: none  Cardiovascular status: acceptable  Respiratory status: acceptable and room air  Hydration status: acceptable    Comments: ---------------------------               02/13/23                      1131         ---------------------------   BP:         127/72        Pulse:         61           Resp:          18           Temp:   97.6 °F (36.4 °C)   SpO2:          97%         ---------------------------

## 2023-02-13 NOTE — ANESTHESIA PREPROCEDURE EVALUATION
Anesthesia Evaluation     Patient summary reviewed and Nursing notes reviewed   NPO Solid Status: > 8 hours  NPO Liquid Status: > 8 hours           Airway   Mallampati: II  TM distance: >3 FB  Neck ROM: full  No difficulty expected  Dental - normal exam     Pulmonary - negative pulmonary ROS and normal exam   Cardiovascular - negative cardio ROS and normal exam        Neuro/Psych  (+) psychiatric history Anxiety,      ROS Comment: Autism, intellectual disability  GI/Hepatic/Renal/Endo    (+) obesity,  GERD poorly controlled,      Musculoskeletal (-) negative ROS    Abdominal  - normal exam   Substance History - negative use     OB/GYN          Other                        Anesthesia Plan    ASA 3     general     intravenous induction     Anesthetic plan, risks, benefits, and alternatives have been provided, discussed and informed consent has been obtained with: legal guardian.        CODE STATUS:

## 2023-02-21 ENCOUNTER — OFFICE VISIT (OUTPATIENT)
Dept: GASTROENTEROLOGY | Facility: CLINIC | Age: 58
End: 2023-02-21
Payer: MEDICARE

## 2023-02-21 VITALS
SYSTOLIC BLOOD PRESSURE: 121 MMHG | WEIGHT: 236 LBS | HEIGHT: 71 IN | BODY MASS INDEX: 33.04 KG/M2 | DIASTOLIC BLOOD PRESSURE: 78 MMHG | HEART RATE: 67 BPM

## 2023-02-21 DIAGNOSIS — K21.00 GASTROESOPHAGEAL REFLUX DISEASE WITH ESOPHAGITIS WITHOUT HEMORRHAGE: Primary | ICD-10-CM

## 2023-02-21 DIAGNOSIS — R10.84 GENERALIZED ABDOMINAL PAIN: ICD-10-CM

## 2023-02-21 DIAGNOSIS — K57.30 DIVERTICULOSIS OF LARGE INTESTINE WITHOUT HEMORRHAGE: ICD-10-CM

## 2023-02-21 PROCEDURE — 99213 OFFICE O/P EST LOW 20 MIN: CPT | Performed by: PHYSICIAN ASSISTANT

## 2023-02-21 RX ORDER — ESCITALOPRAM OXALATE 20 MG/1
TABLET ORAL
COMMUNITY
Start: 2023-02-17

## 2023-08-22 ENCOUNTER — OFFICE VISIT (OUTPATIENT)
Dept: GASTROENTEROLOGY | Facility: CLINIC | Age: 58
End: 2023-08-22
Payer: MEDICARE

## 2023-08-22 VITALS
WEIGHT: 230 LBS | HEIGHT: 71 IN | DIASTOLIC BLOOD PRESSURE: 70 MMHG | HEART RATE: 75 BPM | BODY MASS INDEX: 32.2 KG/M2 | SYSTOLIC BLOOD PRESSURE: 100 MMHG

## 2023-08-22 DIAGNOSIS — K57.30 DIVERTICULOSIS OF LARGE INTESTINE WITHOUT HEMORRHAGE: ICD-10-CM

## 2023-08-22 DIAGNOSIS — K21.00 GASTROESOPHAGEAL REFLUX DISEASE WITH ESOPHAGITIS WITHOUT HEMORRHAGE: Primary | ICD-10-CM

## 2023-08-22 PROCEDURE — 1160F RVW MEDS BY RX/DR IN RCRD: CPT | Performed by: PHYSICIAN ASSISTANT

## 2023-08-22 PROCEDURE — 1159F MED LIST DOCD IN RCRD: CPT | Performed by: PHYSICIAN ASSISTANT

## 2023-08-22 PROCEDURE — 99213 OFFICE O/P EST LOW 20 MIN: CPT | Performed by: PHYSICIAN ASSISTANT

## 2023-08-22 RX ORDER — MIRABEGRON 25 MG/1
25 TABLET, FILM COATED, EXTENDED RELEASE ORAL DAILY
COMMUNITY
Start: 2023-08-19

## 2023-08-22 NOTE — PROGRESS NOTES
Chief Complaint   Patient presents with    Heartburn    Abdominal Pain       ENDO PROCEDURE ORDERED:    Subjective    Orlin Mendes is a 58 y.o. male. he is here today for follow-up.    History of Present Illness    Patient seen on a recheck of his GERD, abdominal pain. Last seen 02/21/2023. He was accompanied by caretaker. He is doing well on the Prilosec. Denied nausea, vomiting, dysphagia. He is on Myrbetriq for his bladder and bowels have been doing well. Weight is down 6 pounds since last visit. Last colonoscopy showed diverticulosis 02/13/2023. Last EGD 10/27/2017, no recent available laboratories.     A/P: Patient with chronic GERD, constipation, doing well on current regimen. Encouraged to continue dietary modification and weight loss. I filled out his form for his facility. AS long as he is doing well, we will try to get a copy of his recent labs. His caretaker requested 1 year follow-up, will see him back sooner if needed.          The following portions of the patient's history were reviewed and updated as appropriate:   Past Medical History:   Diagnosis Date    Abdominal pain 09/23/2013    Anemia     Autistic behavior     Autistic disorder 06/18/2015    Benign prostatic hyperplasia 06/18/2015    symptomatic for    Disruptive behavior disorder 07/20/2015    improved with medication    Diverticulitis     Dysphagia 08/11/2014    Gastritis     GERD (gastroesophageal reflux disease) 08/11/2014    Moderate mental retardation (I.Q. 35-49) 01/21/2016    Onychomycosis      Past Surgical History:   Procedure Laterality Date    COLONOSCOPY      COLONOSCOPY N/A 2/16/2018    Procedure: COLONOSCOPY;  Surgeon: Jus Aguilar MD;  Location: [facility] ENDOSCOPY;  Service:     COLONOSCOPY N/A 2/13/2023    Procedure: COLONOSCOPY;  Surgeon: Jus Aguilar MD;  Location: [facility] ENDOSCOPY;  Service: Gastroenterology;  Laterality: N/A;    ENDOSCOPY N/A 10/27/2017    Procedure: ESOPHAGOGASTRODUODENOSCOPY;  Surgeon: Jus ROSSI  MD Lauren;  Location: [facility] ENDOSCOPY;  Service:     UPPER GASTROINTESTINAL ENDOSCOPY      UPPER GASTROINTESTINAL ENDOSCOPY  10/27/2017     Family History   Problem Relation Age of Onset    Heart disease Mother     Diabetes Mother     Heart disease Father     Heart disease Sister     No Known Problems Brother     No Known Problems Maternal Grandmother     No Known Problems Maternal Grandfather     No Known Problems Paternal Grandmother     No Known Problems Paternal Grandfather     No Known Problems Sister     No Known Problems Sister        No Known Allergies  Social History     Socioeconomic History    Marital status: Single   Tobacco Use    Smoking status: Never    Smokeless tobacco: Never   Vaping Use    Vaping Use: Never used   Substance and Sexual Activity    Alcohol use: No    Drug use: No    Sexual activity: Defer     Current Medications:  Prior to Admission medications    Medication Sig Start Date End Date Taking? Authorizing Provider   acetaminophen (TYLENOL) 325 MG tablet Take 2 tablets by mouth Every 4 (Four) Hours As Needed for Mild Pain . 8/23/21  Yes Ar Galeano MD   bisacodyl (Dulcolax) 5 MG EC tablet Use for Colonoscopy---- Please use Instructions as given in Office. 2/7/23  Yes Bhargav Foreman PA-C   busPIRone (BUSPAR) 15 MG tablet Take 1 tablet by mouth 3 (Three) Times a Day.   Yes Alessandra Conway MD   chlorhexidine (PERIDEX) 0.12 % solution Apply 15 mL to the mouth or throat 2 (Two) Times a Day.   Yes Alessandra Conway MD   citalopram (CeleXA) 40 MG tablet Take 0.5 tablets by mouth Daily.   Yes Alessandra Conway MD   Dextromethorphan-guaiFENesin 5-100 MG/5ML liquid Take 5 mL by mouth 4 (Four) Times a Day As Needed (for cough). 8/23/21  Yes Ar Galeano MD   escitalopram (LEXAPRO) 20 MG tablet  2/17/23  Yes Alessandra Conway MD   hydrocortisone 1 % ointment Apply to rash or scrapes 9/14/18  Yes uJan Hunter APRN   loperamide (IMODIUM) 2 MG capsule Take  "1 capsule by mouth 4 (Four) Times a Day As Needed for Diarrhea. Take 1 cap after each loose stool not to exceed 8 per day prn 8/23/21  Yes Ar Galeano MD   Myrbetriq 25 MG tablet sustained-release 24 hour 24 hr tablet Take 1 tablet by mouth Daily. 8/19/23  Yes ProviderAlessandra MD   neomycin-polymyxin-pramoxine (Antibiotic Plus Pain Relief) 1 % cream Apply  topically to the appropriate area as directed 4 (Four) Times a Day As Needed (prn rash or scraps). 8/23/21  Yes Ar Galeano MD   omeprazole (PrilOSEC) 20 MG capsule Take 1 capsule by mouth Daily. 2/22/21  Yes Juan Hunter APRN   polyethylene glycol (MIRALAX) 17 GM/SCOOP powder Take 17 g by mouth Daily. 2/7/23  Yes Bhargav Foreman PA-C   QUEtiapine (SEROquel) 100 MG tablet Take 1.5 tablets by mouth Every Night.   Yes ProviderAlessandra MD   tamsulosin (FLOMAX) 0.4 MG capsule 24 hr capsule Take one capsule by mouth at 7 am daily 11/24/21  Yes Ar Galeano MD   thioridazine (MELLARIL) 25 MG tablet 1 tab at 7 AM, 1 tab at 12 pm and 2 tabs in 7 PM every day 1/8/21  Yes Juan Hunter APRN     Review of Systems  Review of Systems       Objective    /70   Pulse 75   Ht 180.3 cm (71\")   Wt 104 kg (230 lb)   BMI 32.08 kg/m²   Physical Exam  Vitals and nursing note reviewed.   Constitutional:       General: He is not in acute distress.     Appearance: He is well-developed.   HENT:      Head: Normocephalic and atraumatic.   Eyes:      Pupils: Pupils are equal, round, and reactive to light.   Cardiovascular:      Rate and Rhythm: Normal rate and regular rhythm.      Heart sounds: Normal heart sounds.   Pulmonary:      Effort: Pulmonary effort is normal.      Breath sounds: Normal breath sounds.   Abdominal:      General: Bowel sounds are normal. There is no distension or abdominal bruit.      Palpations: Abdomen is soft. Abdomen is not rigid. There is no shifting dullness or mass.      Tenderness: There is abdominal " tenderness. There is no guarding or rebound.      Hernia: No hernia is present. There is no hernia in the ventral area.   Musculoskeletal:         General: Normal range of motion.      Cervical back: Normal range of motion.   Skin:     General: Skin is warm and dry.   Neurological:      Mental Status: He is alert and oriented to person, place, and time.   Psychiatric:         Behavior: Behavior normal.         Thought Content: Thought content normal.         Judgment: Judgment normal.     Assessment & Plan      1. Gastroesophageal reflux disease with esophagitis without hemorrhage    2. Diverticulosis of large intestine without hemorrhage    .   Diagnoses and all orders for this visit:    1. Gastroesophageal reflux disease with esophagitis without hemorrhage (Primary)    2. Diverticulosis of large intestine without hemorrhage        Orders placed during this encounter include:  No orders of the defined types were placed in this encounter.      Medications prescribed:  No orders of the defined types were placed in this encounter.      Requested Prescriptions      No prescriptions requested or ordered in this encounter       Review and/or summary of lab tests, radiology, procedures, medications. Review and summary of old records and obtaining of history. The risks and benefits of my recommendations, as well as other treatment options were discussed with the patient today. Questions were answered.    Follow-up: Return in about 1 year (around 8/22/2024), or if symptoms worsen or fail to improve.     * Surgery not found *      This document has been electronically signed by Bhargav Foreman PA-C on September 5, 2023 18:35 CDT      Results for orders placed or performed in visit on 09/23/21   CBC Auto Differential    Specimen: Blood   Result Value Ref Range    WBC 3.85 3.40 - 10.80 10*3/mm3    RBC 4.55 4.14 - 5.80 10*6/mm3    Hemoglobin 14.0 13.0 - 17.7 g/dL    Hematocrit 40.6 37.5 - 51.0 %    MCV 89.2 79.0 - 97.0 fL     MCH 30.8 26.6 - 33.0 pg    MCHC 34.5 31.5 - 35.7 g/dL    RDW 12.6 12.3 - 15.4 %    RDW-SD 40.9 37.0 - 54.0 fl    MPV 11.5 6.0 - 12.0 fL    Platelets 154 140 - 450 10*3/mm3    Neutrophil % 55.5 42.7 - 76.0 %    Lymphocyte % 32.2 19.6 - 45.3 %    Monocyte % 8.1 5.0 - 12.0 %    Eosinophil % 3.1 0.3 - 6.2 %    Basophil % 0.8 0.0 - 1.5 %    Immature Grans % 0.3 0.0 - 0.5 %    Neutrophils, Absolute 2.14 1.70 - 7.00 10*3/mm3    Lymphocytes, Absolute 1.24 0.70 - 3.10 10*3/mm3    Monocytes, Absolute 0.31 0.10 - 0.90 10*3/mm3    Eosinophils, Absolute 0.12 0.00 - 0.40 10*3/mm3    Basophils, Absolute 0.03 0.00 - 0.20 10*3/mm3    Immature Grans, Absolute 0.01 0.00 - 0.05 10*3/mm3    nRBC 0.0 0.0 - 0.2 /100 WBC   Iron Profile    Specimen: Blood   Result Value Ref Range    Iron 79 59 - 158 mcg/dL    Iron Saturation (TSAT) 21 20 - 50 %    Transferrin 258 200 - 360 mg/dL    TIBC 384 298 - 536 mcg/dL   Ferritin    Specimen: Blood   Result Value Ref Range    Ferritin 215.00 30.00 - 400.00 ng/mL   Comprehensive Metabolic Panel    Specimen: Blood   Result Value Ref Range    Glucose 112 (H) 65 - 99 mg/dL    BUN 10 6 - 20 mg/dL    Creatinine 0.99 0.76 - 1.27 mg/dL    Sodium 143 136 - 145 mmol/L    Potassium 4.3 3.5 - 5.2 mmol/L    Chloride 106 98 - 107 mmol/L    CO2 27.9 22.0 - 29.0 mmol/L    Calcium 9.1 8.6 - 10.5 mg/dL    Total Protein 6.7 6.0 - 8.5 g/dL    Albumin 4.20 3.50 - 5.20 g/dL    ALT (SGPT) 13 1 - 41 U/L    AST (SGOT) 13 1 - 40 U/L    Alkaline Phosphatase 65 39 - 117 U/L    Total Bilirubin 0.3 0.0 - 1.2 mg/dL    eGFR Non African Amer 78 >60 mL/min/1.73    Globulin 2.5 gm/dL    A/G Ratio 1.7 g/dL    BUN/Creatinine Ratio 10.1 7.0 - 25.0    Anion Gap 9.1 5.0 - 15.0 mmol/L   Results for orders placed or performed in visit on 06/10/21   Urinalysis, Microscopic Only - Urine, Clean Catch    Specimen: Urine, Clean Catch   Result Value Ref Range    RBC, UA 0-2 None Seen, 0-2 /HPF    WBC, UA 0-2 None Seen, 0-2 /HPF    Bacteria, UA None  Seen None Seen /HPF    Squamous Epithelial Cells, UA 0-2 None Seen, 0-2 /HPF    Hyaline Casts, UA None Seen None Seen /LPF    Methodology Automated Microscopy    Urinalysis without microscopic (no culture) - Urine, Clean Catch    Specimen: Urine, Clean Catch   Result Value Ref Range    Color, UA Yellow Yellow, Straw    Appearance, UA Clear Clear    pH, UA 6.5 5.0 - 8.0    Specific Gravity, UA 1.014 1.005 - 1.030    Glucose, UA Negative Negative    Ketones, UA Negative Negative    Bilirubin, UA Negative Negative    Blood, UA Negative Negative    Protein, UA Negative Negative    Leuk Esterase, UA Negative Negative    Nitrite, UA Negative Negative    Urobilinogen, UA 0.2 E.U./dL 0.2 - 1.0 E.U./dL   Results for orders placed or performed in visit on 01/18/21   CBC Auto Differential    Specimen: Blood   Result Value Ref Range    WBC 3.81 3.40 - 10.80 10*3/mm3    RBC 4.49 4.14 - 5.80 10*6/mm3    Hemoglobin 13.7 13.0 - 17.7 g/dL    Hematocrit 39.6 37.5 - 51.0 %    MCV 88.2 79.0 - 97.0 fL    MCH 30.5 26.6 - 33.0 pg    MCHC 34.6 31.5 - 35.7 g/dL    RDW 12.1 (L) 12.3 - 15.4 %    RDW-SD 38.8 37.0 - 54.0 fl    MPV 11.6 6.0 - 12.0 fL    Platelets 168 140 - 450 10*3/mm3    Neutrophil % 56.7 42.7 - 76.0 %    Lymphocyte % 31.5 19.6 - 45.3 %    Monocyte % 9.4 5.0 - 12.0 %    Eosinophil % 1.3 0.3 - 6.2 %    Basophil % 0.8 0.0 - 1.5 %    Immature Grans % 0.3 0.0 - 0.5 %    Neutrophils, Absolute 2.16 1.70 - 7.00 10*3/mm3    Lymphocytes, Absolute 1.20 0.70 - 3.10 10*3/mm3    Monocytes, Absolute 0.36 0.10 - 0.90 10*3/mm3    Eosinophils, Absolute 0.05 0.00 - 0.40 10*3/mm3    Basophils, Absolute 0.03 0.00 - 0.20 10*3/mm3    Immature Grans, Absolute 0.01 0.00 - 0.05 10*3/mm3    nRBC 0.0 0.0 - 0.2 /100 WBC   Comprehensive Metabolic Panel    Specimen: Blood   Result Value Ref Range    Glucose 90 65 - 99 mg/dL    BUN 9 6 - 20 mg/dL    Creatinine 0.82 0.76 - 1.27 mg/dL    Sodium 142 136 - 145 mmol/L    Potassium 4.4 3.5 - 5.2 mmol/L    Chloride  106 98 - 107 mmol/L    CO2 26.1 22.0 - 29.0 mmol/L    Calcium 8.5 (L) 8.6 - 10.5 mg/dL    Total Protein 6.7 6.0 - 8.5 g/dL    Albumin 4.10 3.50 - 5.20 g/dL    ALT (SGPT) 14 1 - 41 U/L    AST (SGOT) 11 1 - 40 U/L    Alkaline Phosphatase 74 39 - 117 U/L    Total Bilirubin 0.2 0.0 - 1.2 mg/dL    eGFR Non African Amer 98 >60 mL/min/1.73    Globulin 2.6 gm/dL    A/G Ratio 1.6 g/dL    BUN/Creatinine Ratio 11.0 7.0 - 25.0    Anion Gap 9.9 5.0 - 15.0 mmol/L     *Note: Due to a large number of results and/or encounters for the requested time period, some results have not been displayed. A complete set of results can be found in Results Review.

## 2024-01-10 NOTE — PATIENT INSTRUCTIONS
Calorie Counting for Weight Loss  Calories are units of energy. Your body needs a certain amount of calories from food to keep you going throughout the day. When you eat more calories than your body needs, your body stores the extra calories as fat. When you eat fewer calories than your body needs, your body burns fat to get the energy it needs.  Calorie counting means keeping track of how many calories you eat and drink each day. Calorie counting can be helpful if you need to lose weight. If you make sure to eat fewer calories than your body needs, you should lose weight. Ask your health care provider what a healthy weight is for you.  For calorie counting to work, you will need to eat the right number of calories in a day in order to lose a healthy amount of weight per week. A dietitian can help you determine how many calories you need in a day and will give you suggestions on how to reach your calorie goal.  · A healthy amount of weight to lose per week is usually 1-2 lb (0.5-0.9 kg). This usually means that your daily calorie intake should be reduced by 500-750 calories.  · Eating 1,200 - 1,500 calories per day can help most women lose weight.  · Eating 1,500 - 1,800 calories per day can help most men lose weight.  What is my plan?  My goal is to have __________ calories per day.  If I have this many calories per day, I should lose around __________ pounds per week.  What do I need to know about calorie counting?  In order to meet your daily calorie goal, you will need to:  · Find out how many calories are in each food you would like to eat. Try to do this before you eat.  · Decide how much of the food you plan to eat.  · Write down what you ate and how many calories it had. Doing this is called keeping a food log.  To successfully lose weight, it is important to balance calorie counting with a healthy lifestyle that includes regular activity. Aim for 150 minutes of moderate exercise (such as walking) or 75  minutes of vigorous exercise (such as running) each week.  Where do I find calorie information?    The number of calories in a food can be found on a Nutrition Facts label. If a food does not have a Nutrition Facts label, try to look up the calories online or ask your dietitian for help.  Remember that calories are listed per serving. If you choose to have more than one serving of a food, you will have to multiply the calories per serving by the amount of servings you plan to eat. For example, the label on a package of bread might say that a serving size is 1 slice and that there are 90 calories in a serving. If you eat 1 slice, you will have eaten 90 calories. If you eat 2 slices, you will have eaten 180 calories.  How do I keep a food log?  Immediately after each meal, record the following information in your food log:  · What you ate. Don't forget to include toppings, sauces, and other extras on the food.  · How much you ate. This can be measured in cups, ounces, or number of items.  · How many calories each food and drink had.  · The total number of calories in the meal.  Keep your food log near you, such as in a small notebook in your pocket, or use a mobile jg or website. Some programs will calculate calories for you and show you how many calories you have left for the day to meet your goal.  What are some calorie counting tips?    · Use your calories on foods and drinks that will fill you up and not leave you hungry:  ? Some examples of foods that fill you up are nuts and nut butters, vegetables, lean proteins, and high-fiber foods like whole grains. High-fiber foods are foods with more than 5 g fiber per serving.  ? Drinks such as sodas, specialty coffee drinks, alcohol, and juices have a lot of calories, yet do not fill you up.  · Eat nutritious foods and avoid empty calories. Empty calories are calories you get from foods or beverages that do not have many vitamins or protein, such as candy, sweets, and  "soda. It is better to have a nutritious high-calorie food (such as an avocado) than a food with few nutrients (such as a bag of chips).  · Know how many calories are in the foods you eat most often. This will help you calculate calorie counts faster.  · Pay attention to calories in drinks. Low-calorie drinks include water and unsweetened drinks.  · Pay attention to nutrition labels for \"low fat\" or \"fat free\" foods. These foods sometimes have the same amount of calories or more calories than the full fat versions. They also often have added sugar, starch, or salt, to make up for flavor that was removed with the fat.  · Find a way of tracking calories that works for you. Get creative. Try different apps or programs if writing down calories does not work for you.  What are some portion control tips?  · Know how many calories are in a serving. This will help you know how many servings of a certain food you can have.  · Use a measuring cup to measure serving sizes. You could also try weighing out portions on a kitchen scale. With time, you will be able to estimate serving sizes for some foods.  · Take some time to put servings of different foods on your favorite plates, bowls, and cups so you know what a serving looks like.  · Try not to eat straight from a bag or box. Doing this can lead to overeating. Put the amount you would like to eat in a cup or on a plate to make sure you are eating the right portion.  · Use smaller plates, glasses, and bowls to prevent overeating.  · Try not to multitask (for example, watch TV or use your computer) while eating. If it is time to eat, sit down at a table and enjoy your food. This will help you to know when you are full. It will also help you to be aware of what you are eating and how much you are eating.  What are tips for following this plan?  Reading food labels  · Check the calorie count compared to the serving size. The serving size may be smaller than what you are used to " "eating.  · Check the source of the calories. Make sure the food you are eating is high in vitamins and protein and low in saturated and trans fats.  Shopping  · Read nutrition labels while you shop. This will help you make healthy decisions before you decide to purchase your food.  · Make a grocery list and stick to it.  Cooking  · Try to cook your favorite foods in a healthier way. For example, try baking instead of frying.  · Use low-fat dairy products.  Meal planning  · Use more fruits and vegetables. Half of your plate should be fruits and vegetables.  · Include lean proteins like poultry and fish.  How do I count calories when eating out?  · Ask for smaller portion sizes.  · Consider sharing an entree and sides instead of getting your own entree.  · If you get your own entree, eat only half. Ask for a box at the beginning of your meal and put the rest of your entree in it so you are not tempted to eat it.  · If calories are listed on the menu, choose the lower calorie options.  · Choose dishes that include vegetables, fruits, whole grains, low-fat dairy products, and lean protein.  · Choose items that are boiled, broiled, grilled, or steamed. Stay away from items that are buttered, battered, fried, or served with cream sauce. Items labeled \"crispy\" are usually fried, unless stated otherwise.  · Choose water, low-fat milk, unsweetened iced tea, or other drinks without added sugar. If you want an alcoholic beverage, choose a lower calorie option such as a glass of wine or light beer.  · Ask for dressings, sauces, and syrups on the side. These are usually high in calories, so you should limit the amount you eat.  · If you want a salad, choose a garden salad and ask for grilled meats. Avoid extra toppings like noble, cheese, or fried items. Ask for the dressing on the side, or ask for olive oil and vinegar or lemon to use as dressing.  · Estimate how many servings of a food you are given. For example, a serving of " cooked rice is ½ cup or about the size of half a baseball. Knowing serving sizes will help you be aware of how much food you are eating at restaurants. The list below tells you how big or small some common portion sizes are based on everyday objects:  ? 1 oz--4 stacked dice.  ? 3 oz--1 deck of cards.  ? 1 tsp--1 die.  ? 1 Tbsp--½ a ping-pong ball.  ? 2 Tbsp--1 ping-pong ball.  ? ½ cup--½ baseball.  ? 1 cup--1 baseball.  Summary  · Calorie counting means keeping track of how many calories you eat and drink each day. If you eat fewer calories than your body needs, you should lose weight.  · A healthy amount of weight to lose per week is usually 1-2 lb (0.5-0.9 kg). This usually means reducing your daily calorie intake by 500-750 calories.  · The number of calories in a food can be found on a Nutrition Facts label. If a food does not have a Nutrition Facts label, try to look up the calories online or ask your dietitian for help.  · Use your calories on foods and drinks that will fill you up, and not on foods and drinks that will leave you hungry.  · Use smaller plates, glasses, and bowls to prevent overeating.  This information is not intended to replace advice given to you by your health care provider. Make sure you discuss any questions you have with your health care provider.  Document Released: 12/18/2006 Document Revised: 09/06/2019 Document Reviewed: 11/17/2017  [employer] Interactive Patient Education © 2020 [employer]      Exercising to Lose Weight  Exercise is structured, repetitive physical activity to improve fitness and health. Getting regular exercise is important for everyone. It is especially important if you are overweight. Being overweight increases your risk of heart disease, stroke, diabetes, high blood pressure, and several types of cancer. Reducing your calorie intake and exercising can help you lose weight.  Exercise is usually categorized as moderate or vigorous intensity. To lose weight, most  people need to do a certain amount of moderate-intensity or vigorous-intensity exercise each week.  Moderate-intensity exercise    Moderate-intensity exercise is any activity that gets you moving enough to burn at least three times more energy (calories) than if you were sitting.  Examples of moderate exercise include:  · Walking a mile in 15 minutes.  · Doing light yard work.  · Biking at an easy pace.  Most people should get at least 150 minutes (2 hours and 30 minutes) a week of moderate-intensity exercise to maintain their body weight.  Vigorous-intensity exercise  Vigorous-intensity exercise is any activity that gets you moving enough to burn at least six times more calories than if you were sitting. When you exercise at this intensity, you should be working hard enough that you are not able to carry on a conversation.  Examples of vigorous exercise include:  · Running.  · Playing a team sport, such as football, basketball, and soccer.  · Jumping rope.  Most people should get at least 75 minutes (1 hour and 15 minutes) a week of vigorous-intensity exercise to maintain their body weight.  How can exercise affect me?  When you exercise enough to burn more calories than you eat, you lose weight. Exercise also reduces body fat and builds muscle. The more muscle you have, the more calories you burn. Exercise also:  · Improves mood.  · Reduces stress and tension.  · Improves your overall fitness, flexibility, and endurance.  · Increases bone strength.  The amount of exercise you need to lose weight depends on:  · Your age.  · The type of exercise.  · Any health conditions you have.  · Your overall physical ability.  Talk to your health care provider about how much exercise you need and what types of activities are safe for you.  What actions can I take to lose weight?  Nutrition    · Make changes to your diet as told by your health care provider or diet and nutrition specialist (dietitian). This may  include:  ? Eating fewer calories.  ? Eating more protein.  ? Eating less unhealthy fats.  ? Eating a diet that includes fresh fruits and vegetables, whole grains, low-fat dairy products, and lean protein.  ? Avoiding foods with added fat, salt, and sugar.  · Drink plenty of water while you exercise to prevent dehydration or heat stroke.  Activity  · Choose an activity that you enjoy and set realistic goals. Your health care provider can help you make an exercise plan that works for you.  · Exercise at a moderate or vigorous intensity most days of the week.  ? The intensity of exercise may vary from person to person. You can tell how intense a workout is for you by paying attention to your breathing and heartbeat. Most people will notice their breathing and heartbeat get faster with more intense exercise.  · Do resistance training twice each week, such as:  ? Push-ups.  ? Sit-ups.  ? Lifting weights.  ? Using resistance bands.  · Getting short amounts of exercise can be just as helpful as long structured periods of exercise. If you have trouble finding time to exercise, try to include exercise in your daily routine.  ? Get up, stretch, and walk around every 30 minutes throughout the day.  ? Go for a walk during your lunch break.  ? Park your car farther away from your destination.  ? If you take public transportation, get off one stop early and walk the rest of the way.  ? Make phone calls while standing up and walking around.  ? Take the stairs instead of elevators or escalators.  · Wear comfortable clothes and shoes with good support.  · Do not exercise so much that you hurt yourself, feel dizzy, or get very short of breath.  Where to find more information  · [place] Department of Health and Human Services: www.hhs.gov  · Centers for Disease Control and Prevention (CDC): www.cdc.gov  Contact a health care provider:  · Before starting a new exercise program.  · If you have questions or concerns about your  weight.  · If you have a medical problem that keeps you from exercising.  Get help right away if you have any of the following while exercising:  · Injury.  · Dizziness.  · Difficulty breathing or shortness of breath that does not go away when you stop exercising.  · Chest pain.  · Rapid heartbeat.  Summary  · Being overweight increases your risk of heart disease, stroke, diabetes, high blood pressure, and several types of cancer.  · Losing weight happens when you burn more calories than you eat.  · Reducing the amount of calories you eat in addition to getting regular moderate or vigorous exercise each week helps you lose weight.  This information is not intended to replace advice given to you by your health care provider. Make sure you discuss any questions you have with your health care provider.  Document Released: 01/20/2012 Document Revised: 12/31/2018 Document Reviewed: 12/31/2018  [employer] Interactive Patient Education © 2020 [employer]       No

## (undated) DEVICE — CANN SMPL SOFTECH BIFLO ETCO2 A/M 7FT

## (undated) DEVICE — SINGLE-USE BIOPSY FORCEPS: Brand: RADIAL JAW 4

## (undated) DEVICE — BITEBLOCK ENDO W/STRAP 60F A/ LF DISP